# Patient Record
Sex: FEMALE | Race: BLACK OR AFRICAN AMERICAN | NOT HISPANIC OR LATINO | Employment: UNEMPLOYED | URBAN - METROPOLITAN AREA
[De-identification: names, ages, dates, MRNs, and addresses within clinical notes are randomized per-mention and may not be internally consistent; named-entity substitution may affect disease eponyms.]

---
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
## 2021-03-12 ENCOUNTER — APPOINTMENT (OUTPATIENT)
Dept: OBGYN | Age: 21
End: 2021-03-12

## 2021-04-15 ENCOUNTER — APPOINTMENT (OUTPATIENT)
Dept: OBGYN | Age: 21
End: 2021-04-15

## 2021-05-28 ENCOUNTER — RECORDS - HEALTHEAST (OUTPATIENT)
Dept: ADMINISTRATIVE | Facility: CLINIC | Age: 21
End: 2021-05-28

## 2021-05-31 VITALS — HEIGHT: 62 IN | WEIGHT: 105.6 LBS | BODY MASS INDEX: 19.43 KG/M2

## 2021-06-10 NOTE — PROGRESS NOTES
Ibrahima from Dr. Sadnrita Acosta office called in requesting pt's surgical clearance, H&P and labs. Fax # [phone] - pt's surgery is 10/23. [facility] Well Child Check    ASSESSMENT & PLAN  Kevin Urena is a 16  y.o. 6  m.o. who has normal growth and abnormal development:  history of significant mental health problems, currently off meds, not doing well.    Diagnoses and all orders for this visit:    Encounter for routine child health examination without abnormal findings  -     Hearing Screening  -     Vision Screening  -     Sodium Fluoride Application    Dysmenorrhea  -     Pregnancy (Beta-hCG, Qual), Urine    Other orders  -     Cancel: Meningococcal MCV4P  -     Cancel: Hemoglobin  -     sodium fluoride 5 % white varnish 1 packet (VANISH); Apply 1 packet to teeth once.        Return to clinic in 1 year for a Well Child Check or sooner as needed    IMMUNIZATIONS/LABS  Mom refused vaccinations.    REFERRALS  Dental:  Recommend routine dental care as appropriate., Recommended that the patient establish care with a dentist.  Other:  Referrals were made for psychiatric eval, counseling    ANTICIPATORY GUIDANCE  I have reviewed age appropriate anticipatory guidance.  Social:  Friends, Employment and Extracurricular Activities  Parenting:  Homework  Nutrition:  Junk Food and Dieting  Play and Communication:  Hobbies  Health:  Activity (>45 min/day), Sleep and Dental Care  Safety:  Seat Belts  Sexuality:  Safe Sex and Contraception    HEALTH HISTORY  Do you have any concerns that you'd like to discuss today?: depo shot     Mom wants her to start birth control, specifically the Depo shot for painful menstrual cramps. She has never been sexually active. She started her period 4 years ago, it is not regular and has never been. She gets her period around every 7 weeks. She takes 2 Advil or tylenol for cramps. She does not want Nexplanon or IUD. Her period is heavy and lasts for 5 days.    Asthma: Asthma symptoms have been improving in the last few years. She does not have an inhaler and does not think she needs it. She coughs and feels short of breath  "only when running. When she was younger cold symptoms and physical activity triggered asthma symptoms. She used a nebulizer in the past. No allergy symptoms. ACT today is 25. No ER/hospitalizations in the past 12 months.     Mental health: In the past she has been diagnosed with ADHD, ODD, and bipolar disorder. She thinks she is fine but mom thinks Kevin is never happy. Moms friends have reached out to her asking why she is so sad. She was on multiple medications for her psychiatric issues in the past but she is no longer taking any of them because mom thought she was doing well. She has thoughts about hurting herself or wishing she was dead. She does not think she has anyone to talk to if she was feeling like hurting herself. Mom thinks that Kevin would benefit from being on a medication for ADHD again. When she was in public school last year in 10th grade she had problems getting along with other kids and got in a lot of fights.     Mom provided history in private and noted that dad has a \"negative control\" over Kevin, she has very little contact with her dad. Her dad lives in [place] and she wants to go move with him because he tells her he will give her what she wants like an iphone. She has tried \"hurting herself\" this year with ibuprofen pills. She told mom that she was going to take a lot of ibuprofen but she did not do it. Kevin denies it was a suicide attempt. Mom thinks Kevin is \"playing with the idea of suicide\". She is getting into trouble at school. Mom is becoming scared about Kevin's behavior. She was in [facility] in 2014 after a suicide attempt. She has seen a psychiatrist at [facility] in 2013. When she was in counseling she \"outwitted the counselor\" and it did not go well. Mom thinks Kevin ws not really telling the counselor what was going on. Mom has a significant concern she will get kicked out of job [employer] due to her behavior. She falls asleep in school or sneaks out of " "class and walks around the halls.     PHQ 9 today is 16, GUCCI 7 is 9.    Roomed by: caren    Accompanied by Mother    Refills needed? No    Do you have any forms that need to be filled out? No        Do you have any significant health concerns in your family history?: DM, HTN  Family History   Problem Relation Age of Onset     Diabetes Maternal Grandmother      Hyperlipidemia Maternal Grandmother      Heart disease Maternal Grandmother      Asthma Sister      Allergies Sister      Since your last visit, have there been any major changes in your family, such as a move, job change, separation, divorce, or death in the family?: No    Home  Who lives in your home?:  Mom, 2 brother, sister, dog, grandmother,   Social History     Social History Narrative     No narrative on file     Do you have any trouble with sleep?:  yes    Education  What school does your child attend?:  [employer]  What grade is your child in?:  no grade  How does the patient perform in school (grades, behavior, attention, homework?: good   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      Eating  Does patient eat regular meals including fruits and vegetables?:  no  What is the patient drinking (cow's milk, water, soda, juice, sports drinks, energy drinks, etc)?: water, soda and juice, any kind of milk  Does patient have concerns about body or appearance?:  No  She does not eat a lot and does not have an appetite. Mom has been worried about Kevin's eating habits and says she \"eats like a bird\". She eats fruit and veggies and has 3 glasses of 2% milk per day. Great grandma gives her Ensure, she has 8 cans a day. Mom tries to hide Ensure from her. She has had this habit for a few years.     Activities  Does the patient have friends?:  yes  Does the patient get at least one hour of physical activity per day?: She is not active.   Does the patient have less than 2 hours of screen time per day (aside from homework)?:  no  What does " "your child do for exercise?:  Walk, soccer  Does the patient have interest/participate in community activities/volunteers/school sports?:  Yes  School starts at 8 and ends at 3:40. [employer] is in school, goes until August. Mom does not have close communication with school.  She started [employer] ro[employer] because school was difficult for her. Grades went from A's to F's in school once she started making friends at her old school.     MENTAL HEALTH SCREENING  No Data Recorded  No Data Recorded    VISION/HEARING  Vision: Not done: Performed elsewhere: [facility]  Hearing:  Completed. See Results    No exam data present    TB Risk Assessment:  The patient and/or parent/guardian answer positive to:  patient and/or parent/guardian answer 'no' to all screening TB questions    Flouride Varnish Application Screening  Is child seen by dentist?     No  Fluoride Varnish Application risks and benefits discussed and verbal consent was received.    Patient Active Problem List   Diagnosis     Severe Persistent Asthma     Disruptive Behavior Disorder     Attention-deficit Hyperactivity Disorder     Allergic Rhinitis     Bipolar Disorder NOS     Autistic Disorder       Drugs  Does the patient use tobacco/alcohol/drugs?:  No. She has tried smoking marijuana before and notes she would do it more if she could. It helped her calm down.     Safety  Does the patient have any safety concerns (peer or home)?:  no  Does the patient use safety belts, helmets and other safety equipment?:  n/a    Sex  Is the patient sexually active?:  no    MEASUREMENTS  Height:  5' 2\" (1.575 m)  Weight: 105 lb 9.6 oz (47.9 kg)  BMI: Body mass index is 19.31 kg/(m^2).  Blood Pressure: 102/64  Blood pressure percentiles are 22 % systolic and 45 % diastolic based on NHBPEP's 4th Report. Blood pressure percentile targets: 90: 123/79, 95: 127/83, 99 + 5 mmH/96.    PHYSICAL EXAM  Constitutional: She appears well-developed and well-nourished.   HEENT: Head: " Normocephalic.    Right Ear: Tympanic membrane, external ear and canal normal.    Left Ear: Tympanic membrane, external ear and canal normal.    Nose: Nose normal.    Mouth/Throat: Mucous membranes are moist. Oropharynx is clear.    Eyes: Conjunctivae and lids are normal. Pupils are equal, round, and reactive to light. Optic discs are sharp.   Neck: Neck supple. No tenderness is present.   Cardiovascular: Normal rate and regular rhythm. No murmur heard.  Pulses: Femoral pulses are 2+ bilaterally.   Pulmonary/Chest: Effort normal and breath sounds normal. There is normal air entry. Breast development is normal.    Abdominal: Soft. There is no hepatosplenomegaly. No inguinal hernia.   Musculoskeletal: Normal range of motion. Normal strength and tone. No abnormalities. Spine is straight. Normal duck walk.  Normal heel to toe walk.   : Normal external female genitalia.  Jose Daniel stage 5.   Neurological: She is alert. She has normal reflexes. Gait normal.   Psychiatric: She has a normal mood and affect. Her speech is normal and behavior is normal.  Skin: Clear. No rashes.     ADDITIONAL HISTORY SUMMARIZED (2): None.  DECISION TO OBTAIN EXTRA INFORMATION (1): None.   RADIOLOGY TESTS (1): None.  LABS (1): Labs ordered.   MEDICINE TESTS (1): None.  INDEPENDENT REVIEW (2 each): None.     The visit lasted a total of 60 minutes face to face with the patient. Over 50% of the time was spent counseling and educating the patient about birth control.     In addition to the usual time spent on well , an additional 25 minutes were spent counseling and coordinating care regarding the above issues.        IJosefa, am scribing for and in the presence of, Dr. Brown.    I, Dr. Brown, personally performed the services described in this documentation, as scribed by Joseaf Velez in my presence, and it is both accurate and complete.

## 2021-11-22 ENCOUNTER — HOSPITAL ENCOUNTER (OUTPATIENT)
Facility: CLINIC | Age: 21
Setting detail: OBSERVATION
Discharge: HOME | End: 2021-11-23
Attending: EMERGENCY MEDICINE | Admitting: EMERGENCY MEDICINE

## 2021-11-22 DIAGNOSIS — Z11.52 ENCOUNTER FOR SCREENING LABORATORY TESTING FOR SEVERE ACUTE RESPIRATORY SYNDROME CORONAVIRUS 2 (SARS-COV-2): ICD-10-CM

## 2021-11-22 DIAGNOSIS — F10.920 ALCOHOLIC INTOXICATION WITHOUT COMPLICATION (H): ICD-10-CM

## 2021-11-22 DIAGNOSIS — F10.120 ALCOHOL ABUSE WITH UNCOMPLICATED INTOXICATION (H): ICD-10-CM

## 2021-11-22 PROBLEM — F10.929 ALCOHOL INTOXICATION (H): Status: ACTIVE | Noted: 2021-11-22

## 2021-11-22 LAB
ALBUMIN SERPL-MCNC: 3.6 G/DL (ref 3.4–5)
ALP SERPL-CCNC: 57 U/L (ref 40–150)
ALT SERPL W P-5'-P-CCNC: 15 U/L (ref 0–50)
ANION GAP SERPL CALCULATED.3IONS-SCNC: 10 MMOL/L (ref 3–14)
AST SERPL W P-5'-P-CCNC: 22 U/L (ref 0–45)
BASOPHILS # BLD AUTO: 0 10E3/UL (ref 0–0.2)
BASOPHILS NFR BLD AUTO: 1 %
BILIRUB SERPL-MCNC: 0.7 MG/DL (ref 0.2–1.3)
BUN SERPL-MCNC: 5 MG/DL (ref 7–30)
CALCIUM SERPL-MCNC: 8.9 MG/DL (ref 8.5–10.1)
CHLORIDE BLD-SCNC: 111 MMOL/L (ref 94–109)
CO2 SERPL-SCNC: 23 MMOL/L (ref 20–32)
CREAT SERPL-MCNC: 0.64 MG/DL (ref 0.52–1.04)
EOSINOPHIL # BLD AUTO: 0 10E3/UL (ref 0–0.7)
EOSINOPHIL NFR BLD AUTO: 0 %
ERYTHROCYTE [DISTWIDTH] IN BLOOD BY AUTOMATED COUNT: 12.6 % (ref 10–15)
ETHANOL SERPL-MCNC: 0.28 G/DL
GFR SERPL CREATININE-BSD FRML MDRD: >90 ML/MIN/1.73M2
GLUCOSE BLD-MCNC: 86 MG/DL (ref 70–99)
HCT VFR BLD AUTO: 38.6 % (ref 35–47)
HGB BLD-MCNC: 12.6 G/DL (ref 11.7–15.7)
HOLD SPECIMEN: NORMAL
IMM GRANULOCYTES # BLD: 0 10E3/UL
IMM GRANULOCYTES NFR BLD: 0 %
LYMPHOCYTES # BLD AUTO: 2.7 10E3/UL (ref 0.8–5.3)
LYMPHOCYTES NFR BLD AUTO: 35 %
MCH RBC QN AUTO: 29.7 PG (ref 26.5–33)
MCHC RBC AUTO-ENTMCNC: 32.6 G/DL (ref 31.5–36.5)
MCV RBC AUTO: 91 FL (ref 78–100)
MONOCYTES # BLD AUTO: 0.4 10E3/UL (ref 0–1.3)
MONOCYTES NFR BLD AUTO: 6 %
NEUTROPHILS # BLD AUTO: 4.5 10E3/UL (ref 1.6–8.3)
NEUTROPHILS NFR BLD AUTO: 58 %
NRBC # BLD AUTO: 0 10E3/UL
NRBC BLD AUTO-RTO: 0 /100
PLATELET # BLD AUTO: 296 10E3/UL (ref 150–450)
POTASSIUM BLD-SCNC: 3.1 MMOL/L (ref 3.4–5.3)
PROT SERPL-MCNC: 7.4 G/DL (ref 6.8–8.8)
RBC # BLD AUTO: 4.24 10E6/UL (ref 3.8–5.2)
SODIUM SERPL-SCNC: 144 MMOL/L (ref 133–144)
WBC # BLD AUTO: 7.7 10E3/UL (ref 4–11)

## 2021-11-22 PROCEDURE — 99218 PR INITIAL OBSERVATION CARE,LEVEL I: CPT | Performed by: EMERGENCY MEDICINE

## 2021-11-22 PROCEDURE — 99285 EMERGENCY DEPT VISIT HI MDM: CPT | Performed by: EMERGENCY MEDICINE

## 2021-11-22 PROCEDURE — C9803 HOPD COVID-19 SPEC COLLECT: HCPCS | Performed by: EMERGENCY MEDICINE

## 2021-11-22 PROCEDURE — 85025 COMPLETE CBC W/AUTO DIFF WBC: CPT | Performed by: EMERGENCY MEDICINE

## 2021-11-22 PROCEDURE — G0378 HOSPITAL OBSERVATION PER HR: HCPCS

## 2021-11-22 PROCEDURE — 36415 COLL VENOUS BLD VENIPUNCTURE: CPT | Performed by: EMERGENCY MEDICINE

## 2021-11-22 PROCEDURE — 82077 ASSAY SPEC XCP UR&BREATH IA: CPT | Performed by: EMERGENCY MEDICINE

## 2021-11-22 PROCEDURE — 80053 COMPREHEN METABOLIC PANEL: CPT | Performed by: EMERGENCY MEDICINE

## 2021-11-23 VITALS
DIASTOLIC BLOOD PRESSURE: 73 MMHG | HEART RATE: 88 BPM | TEMPERATURE: 98.7 F | SYSTOLIC BLOOD PRESSURE: 127 MMHG | RESPIRATION RATE: 20 BRPM | OXYGEN SATURATION: 98 %

## 2021-11-23 LAB — SARS-COV-2 RNA RESP QL NAA+PROBE: NEGATIVE

## 2021-11-23 PROCEDURE — 99217 PR OBSERVATION CARE DISCHARGE: CPT | Performed by: EMERGENCY MEDICINE

## 2021-11-23 PROCEDURE — G0378 HOSPITAL OBSERVATION PER HR: HCPCS

## 2021-11-23 PROCEDURE — U0003 INFECTIOUS AGENT DETECTION BY NUCLEIC ACID (DNA OR RNA); SEVERE ACUTE RESPIRATORY SYNDROME CORONAVIRUS 2 (SARS-COV-2) (CORONAVIRUS DISEASE [COVID-19]), AMPLIFIED PROBE TECHNIQUE, MAKING USE OF HIGH THROUGHPUT TECHNOLOGIES AS DESCRIBED BY CMS-2020-01-R: HCPCS | Performed by: EMERGENCY MEDICINE

## 2021-11-23 NOTE — ED TRIAGE NOTES
Pt. BIBA from group home for ETOH intoxication. Pt. Responds to pain but otherwise is lethargic. Pt. Placed on cardiac and o2 monitoring, close to nursing observation, WCTM.

## 2021-11-23 NOTE — ED PROVIDER NOTES
ED Observation History and Physical  [facility]  Observation Initiation Date: Nov 22, 2021    Kevin Urena MRN: [id number]   Age: 21 year old YOB: 2000     History     Chief Complaint   Patient presents with     Alcohol Intoxication     HPI  Kevin Urena is a 21 year old female who presented to the ED with altered mental status. History limited due to altered mental status. There is reason to suspect alcohol and/or drug intoxication as the etiology of altered mental status. EMS reports they were called to patient's group home due to her being intoxicated and unresponsive.  Patient had another resident had been drinking together.  Patient and a friend drank 0.5L of vodka.        Past Medical and Surgical History, Medications, Allergies, and Social History were reviewed in the electronic medical record. Review with the patient was attempted but limited due to altered mental status.      Review of Systems  A complete review of systems was attempted but limited due to altered mental status.    Physical Exam        Physical Exam  General: smells of EtOH, no acute distress  HENT: MMM. Atraumatic head.   Eyes: PERRL, normal sclerae, nystagmus present  Neck: non-tender, supple  Cardio: Regular rate. Regular rhythm.   Resp: Normal work of breathing, normal respiratory rate  Abdomen: no tenderness, non-distended, no rebound, no guarding  Neuro: alert, slow to respond. Slurred speech. Confused. CN II-XII grossly intact. Grossly normal strength and sensation.   Integumentary/Skin: no rash visualized, normal color    ED Course      Procedures                       Labs Ordered and Resulted from Time of ED Arrival to Time of ED Departure - No data to display         Assessments & Plan (with Medical Decision Making)   Patient arriving with altered mental status with reason to suspect alcohol or other drug intoxication as etiology. Nursing notes reviewed. Exam without findings  suggestive of trauma, non-focal. EtOH level was 0.28. Potassium is 3.1. Glucose normal per EMS report.     AMS likely due to intoxication delirium but cannot immediately rule out other dangerous etiologies of AMS. Plan close clinical monitoring of the patient and her mental status for clearing of intoxicating substance. With approriate clearing, the patient would likely be able to be discharged. If not appropriately clearing, plan broadening of work-up, potentially including CT head and serum labs.     During my care, the patient did not require medications for agitation, and did not require restraints/seclusion for patient and/or provider safety.     With period of monitoring, the patient continues to clear appropriately but is not yet clinically sober at this time. Patient signed out to oncoming provider with plan for further monitoring, likely discharge if appropriately clear with sobriety, further work-up if indicated.     Preliminary diagnosis:  Altered mental status, unspecified     --  Juan Carlos Hall,    [facility] EMERGENCY DEPARTMENT  11/22/2021      Juan Carlos Hall,   11/23/21 0121

## 2021-11-23 NOTE — ED PROVIDER NOTES
ED Observation Discharge Summary  [facility]  Discharge Date: 11/23/2021    Kevin Urena MRN: [id number]   Age: 21 year old YOB: 2000     Brief HPI & Initial ED Course     Chief Complaint   Patient presents with     Alcohol Intoxication     HPI  Kevin Urena is a 21 year old female who presented to the ED with altered mental status. Initial history was limited due to altered mental status. The patient arrived with altered mental status with reason to suspect alcohol or other drug intoxication as etiology, and an exam that was without findings suggestive of trauma.     Upon being evaluated in the emergency department, the patient was found to have a condition that would benefit from ongoing monitoring. Observation care was initiated with plan for close clinical monitoring of the patient and her mental status for clearing of intoxicating substance, and broadening of the work-up if not clearing appropriately or if other indications develop.     See ED Observation H&P for further details on the patient's presenting history and initial evaluation.     Physical Exam   BP: 100/59  Pulse: 71  Resp: 17  SpO2: 99 %    Physical Exam  General: no acute distress. Alert.   HENT: MMM. Atraumatic head.   Eyes: PERRL, normal sclerae   Neck: non-tender, supple  Cardio: Regular rate. Regular rhythm.   Resp: Normal work of breathing, normal respiratory rate  Abdomen: no tenderness, non-distended, no rebound, no guarding  Neuro: alert, fully oriented. Steady gait. CN II-XII grossly intact. Grossly normal strength and sensation.   Integumentary/Skin: no rash visualized, normal color    Results      Procedures              Labs Ordered and Resulted from Time of ED Arrival to Time of ED Departure   ETHYL ALCOHOL LEVEL - Abnormal       Result Value    Alcohol ethyl 0.28 (*)    COMPREHENSIVE METABOLIC PANEL - Abnormal    Sodium 144      Potassium 3.1 (*)     Chloride 111 (*)     Carbon  Dioxide (CO2) 23      Anion Gap 10      Urea Nitrogen 5 (*)     Creatinine 0.64      Calcium 8.9      Glucose 86      Alkaline Phosphatase 57      AST 22      ALT 15      Protein Total 7.4      Albumin 3.6      Bilirubin Total 0.7      GFR Estimate >90     CBC WITH PLATELETS AND DIFFERENTIAL    WBC Count 7.7      RBC Count 4.24      Hemoglobin 12.6      Hematocrit 38.6      MCV 91      MCH 29.7      MCHC 32.6      RDW 12.6      Platelet Count 296      % Neutrophils 58      % Lymphocytes 35      % Monocytes 6      % Eosinophils 0      % Basophils 1      % Immature Granulocytes 0      NRBCs per 100 WBC 0      Absolute Neutrophils 4.5      Absolute Lymphocytes 2.7      Absolute Monocytes 0.4      Absolute Eosinophils 0.0      Absolute Basophils 0.0      Absolute Immature Granulocytes 0.0      Absolute NRBCs 0.0     COVID-19 VIRUS (CORONAVIRUS) BY PCR            Observation Course   The patient was admitted to observation status with plan for close clinical monitoring of the patient and her mental status for clearing of intoxicating substance, and broadening of the work-up if not clearing appropriately or if other indications develop.     Serial assessments of the patient's mental status were performed. Nursing notes were reviewed. During the observation period, the patient did not require medications for agitation, and did not require restraints/seclusion for patient and/or provider safety.         With monitoring, patient's mental status cleared. Patient then clinically sober with steady gait and tolerating PO. Normalization of mental status with sobering makes other causes of altered mental status very unlikely.     After counseling on the diagnosis, work-up, and treatment plan, the patient was discharged. Recommended safe cessation of EtOH/drugs and provided information on community treatment resources. Patient to follow-up with primary care in the coming days for recheck and further cessation counseling. Patient to  return to the ED if any urgent or potentially life-threatening concerns.    Discharge Diagnoses:   Final diagnoses:   Alcoholic intoxication without complication (H)       --  Terry Nathan MD  [facility] EMERGENCY DEPARTMENT  11/23/2021             Terry Nathan MD  11/23/21 0721

## 2022-08-06 ENCOUNTER — OFFICE VISIT (OUTPATIENT)
Dept: URGENT CARE | Facility: URGENT CARE | Age: 22
End: 2022-08-06
Payer: COMMERCIAL

## 2022-08-06 VITALS
HEART RATE: 81 BPM | DIASTOLIC BLOOD PRESSURE: 74 MMHG | SYSTOLIC BLOOD PRESSURE: 131 MMHG | BODY MASS INDEX: 19.2 KG/M2 | TEMPERATURE: 98.5 F | OXYGEN SATURATION: 100 % | WEIGHT: 105 LBS

## 2022-08-06 DIAGNOSIS — M54.6 ACUTE MIDLINE THORACIC BACK PAIN: Primary | ICD-10-CM

## 2022-08-06 PROCEDURE — 99203 OFFICE O/P NEW LOW 30 MIN: CPT | Performed by: FAMILY MEDICINE

## 2022-08-06 RX ORDER — HYDROXYZINE HYDROCHLORIDE 50 MG/1
TABLET, FILM COATED ORAL
COMMUNITY
Start: 2022-06-29 | End: 2023-02-08

## 2022-08-06 RX ORDER — CYCLOBENZAPRINE HCL 10 MG
10 TABLET ORAL 3 TIMES DAILY PRN
Qty: 30 TABLET | Refills: 1 | Status: SHIPPED | OUTPATIENT
Start: 2022-08-06 | End: 2023-02-08

## 2022-08-06 NOTE — PROGRESS NOTES
Subjective: Patient just started a new job where she is standing all day but is not particularly physical and today was her second day at work and she felt fine in the morning but starting about 1:00 after about 7 hours of work she developed some pain in the thoracic region of her back.  Last December she had a fall on the ice and had pain in that area but it seemed to get better in a short time so she did not think much of it.  She has done this kind of work in the past without any difficulty.  She took Advil about 2 hours ago and it has not helped much.  It hurts to twist.    Objective: Vitals are stable.  Moves normally.  The lungs are clear.  No pain on palpation in the mid thoracic region.  No CVA tenderness.  Abdomen is benign.  It hurts to lie down.    Assessment and plan: I think she just had an kind of overuse injury probably partly due to the previous injury in December and it just needs some time to recover.  Unfortunately since she just started the job she does not feel she can take any time off and hopefully she can power through it.  I did offer some Flexeril for muscle spasm.  I warned her about side effects of sleepiness.  X-rays would not be particularly helpful as this is not likely in the bones.

## 2022-08-29 ENCOUNTER — HOSPITAL ENCOUNTER (EMERGENCY)
Facility: CLINIC | Age: 22
Discharge: NURSING, REHAB OR LONG-TERM CARE | End: 2022-08-30
Attending: PSYCHIATRY & NEUROLOGY | Admitting: PSYCHIATRY & NEUROLOGY
Payer: COMMERCIAL

## 2022-08-29 DIAGNOSIS — F10.920 ALCOHOLIC INTOXICATION WITHOUT COMPLICATION (H): ICD-10-CM

## 2022-08-29 DIAGNOSIS — F16.10 ECSTASY ABUSE (H): ICD-10-CM

## 2022-08-29 DIAGNOSIS — R45.851 SUICIDAL IDEATION: ICD-10-CM

## 2022-08-29 LAB — ALCOHOL BREATH TEST: 0.2 (ref 0–0.01)

## 2022-08-29 PROCEDURE — 99285 EMERGENCY DEPT VISIT HI MDM: CPT | Mod: 25 | Performed by: PSYCHIATRY & NEUROLOGY

## 2022-08-29 PROCEDURE — 99283 EMERGENCY DEPT VISIT LOW MDM: CPT | Performed by: PSYCHIATRY & NEUROLOGY

## 2022-08-29 PROCEDURE — 96372 THER/PROPH/DIAG INJ SC/IM: CPT | Performed by: INTERNAL MEDICINE

## 2022-08-29 PROCEDURE — 82075 ASSAY OF BREATH ETHANOL: CPT | Performed by: PSYCHIATRY & NEUROLOGY

## 2022-08-29 PROCEDURE — 250N000011 HC RX IP 250 OP 636: Performed by: INTERNAL MEDICINE

## 2022-08-29 RX ORDER — OLANZAPINE 10 MG/2ML
5 INJECTION, POWDER, FOR SOLUTION INTRAMUSCULAR ONCE
Status: COMPLETED | OUTPATIENT
Start: 2022-08-29 | End: 2022-08-29

## 2022-08-29 RX ADMIN — OLANZAPINE 5 MG: 10 INJECTION, POWDER, FOR SOLUTION INTRAMUSCULAR at 23:17

## 2022-08-29 ASSESSMENT — ENCOUNTER SYMPTOMS
DIFFICULTY URINATING: 0
FEVER: 0
ABDOMINAL PAIN: 0
SHORTNESS OF BREATH: 0
EYE REDNESS: 0
ARTHRALGIAS: 0
CONFUSION: 0
HEADACHES: 0
NECK STIFFNESS: 0
COLOR CHANGE: 0

## 2022-08-29 ASSESSMENT — ACTIVITIES OF DAILY LIVING (ADL): ADLS_ACUITY_SCORE: 35

## 2022-08-30 VITALS
TEMPERATURE: 96.6 F | RESPIRATION RATE: 16 BRPM | HEART RATE: 95 BPM | SYSTOLIC BLOOD PRESSURE: 126 MMHG | DIASTOLIC BLOOD PRESSURE: 87 MMHG | OXYGEN SATURATION: 99 %

## 2022-08-30 PROCEDURE — 90791 PSYCH DIAGNOSTIC EVALUATION: CPT

## 2022-08-30 ASSESSMENT — ACTIVITIES OF DAILY LIVING (ADL)
ADLS_ACUITY_SCORE: 35

## 2022-08-30 NOTE — ED NOTES
Emergency Department Patient Sign-out       Brief HPI:  This is a 21 year old female signed out to me by Dr. Lincoln .  See initial ED Provider note for details of the presentation.            Significant Events prior to my assuming care: From . Used ecstacy, etoh and SI from [place]. Agitated here, Code called and zyprexa given. Needs MH eval.      Exam:   Patient Vitals for the past 24 hrs:   BP Temp Temp src Pulse Resp SpO2   08/29/22 2349 115/63 (!) 96.6  F (35.9  C) Axillary 84 16 99 %           ED RESULTS:   Results for orders placed or performed during the hospital encounter of 08/29/22 (from the past 24 hour(s))   Alcohol breath test POCT     Status: Abnormal    Collection Time: 08/29/22 10:53 PM   Result Value Ref Range    Alcohol Breath Test 0.199 (A) 0.00 - 0.01       ED MEDICATIONS:   Medications   OLANZapine (zyPREXA) injection 5 mg (5 mg Intramuscular Given 8/29/22 6085)         Impression:    ICD-10-CM    1. Suicidal ideation  R45.851    2. 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 Alcoholic intoxication without complication (H)  F10.920        Plan:    Patient sobered overnight. Has no SI this morning. Will have  see patient, anticipate discharge home. Pt signed out to morning provider..        MD Jac Hernandez, Terry Villanueva MD  08/30/22 8181
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
## 2022-08-30 NOTE — ED NOTES
Patient tearful and concerned about losing her bed at the shelter. Patient requesting staff to call the [facility] and let them know that she is here so that she does not lose her bed due to being late for curfew. Writer spoke with Francine at the [facility] per patient request. Francine reported that patient needs to show paperwork when she comes back to the house that she was seen at the hospital.

## 2022-08-30 NOTE — ED NOTES
Clinician reviewed chart and attempted to see patient.  She is sleeping and not easily awakened.  Discussed with Dr. Dominguez.  Assessment deferred until patient able to engage in interview.    Nayeli Jane, RIKCSW

## 2022-08-30 NOTE — ED NOTES
Pt signed out to me by Dr. David Nathan at 7 am.       Situation: 22 yo female who was brought to the ER intoxicated and under influence of substances. Pt was unable to contract for safety and had a code 21 called and was given zyprexa overnight.     Plan:  Morning assessment.     Shift Report:    Pt was seen by the DEC . She got kicked out of her mom's house few months prior. She is currently staying in a shelter.   She was previously in job core and is now kicked out because of some legal issues. She is wanting to resolve her legal issues so she can get back on job core. She is on zoloft and atarax. No SI or HI. She does not remember what happened last night. She does not recall jumping in front of bus. She denies using ectasy last night but did use earlier this month.        Signed:  Gretchen Dominguez MD  August 30, 2022 at 9:15 AM       Gretchen Dominguez MD  08/30/22 0915

## 2022-08-30 NOTE — ED NOTES
"Pt. continues to try and leave.  Grabbed pen of cart by security and refused to give it up to staff.  Pt. held pen in hand and told security,  \"I don't care about myself.  I don't care about anyone.\"  Pt. made motion like she was going to use pen to hurt herself. Pt. eventually gave up pen, but then tried to enter area to get clothing.  When asked to stop pt. began kicking security and other staff.  Kick writer in abd.  Was then taken to ground,  code 21 called.  Pt. placed on backboard and taken to room 14 and placed in 5 point restraints.  Medicated with 5 mg IM zyprexa.  "

## 2022-08-30 NOTE — ED TRIAGE NOTES
Patient brought in by EMS for threatening suicidal and jumping in front of buses at the [facility]. EMS reports that she reports taking ecstasy and is high. EMS reports that she is rambling and crying.    Patient denies taking ecstasy, reports that she wanted it.

## 2022-08-30 NOTE — DISCHARGE INSTRUCTIONS
The Triage and Transitions care coordination team will call you to offer assistance in scheduling therapy.  They can be reached at [phone].  Please follow up with your established provider for continued medication management.        Aftercare Plan  If I am feeling unsafe or I am in a crisis, I will:   Contact my established care providers   Call the National Suicide Prevention Lifeline: 988  Go to the nearest emergency room   Call 911     Warning signs that I or other people might notice when a crisis is developing for me: increased drinking, anger/agitation, anxiety    Things I am able to do on my own to cope or help me feel better: take my medication, decrease/stop drinking alcohol or using drugs    Things that I am able to do with others to cope or help me better: spend time with friends/roommate     Things I can use or do for distraction: taking a shower/bath, music, work     Changes I can make to support my mental health and wellness: start therapy    People in my life that I can ask for help: my roommate.  Family-mom and grandmother     Your [facility] has a mental health crisis team you can call 24/7: [facility] Mobile Crisis  [phone] (adults)  [phone] (children)    Other things that are important when I'm in crisis:     Additional resources and information: Reduce Extreme Emotion  QUICKLY:  Changing Your Body Chemistry      T:  Change your body Temperature to change your autonomic nervous system   Use Ice Water to calm yourself down FAST   Put your face in a bowl of ice water (this is the best way; have the person keep his/her face in ice water for 30-45 seconds - initial research is showing that the longer s/he can hold her/his face in the water, the better the response), or   Splash ice water on your face, or hold an ice pack on your face      I:  Intensely exercise to calm down a body revved up by emotion   Examples: running, walking fast, jumping, playing basketball, weight lifting,  swimming, calisthenics, etc.   Engage in exercises that DO NOT include violent behaviors. Exercises that utilize violent behaviors tend to function as  behavioral rehearsal,  and rather than calming the person down, may actually  rev  the person up more, increasing the likelihood of violence, and lessening the likelihood that they will  burn off  energy     P:  Progressively relax your muscles   Starting with your hands, moving to your forearms, upper arms, shoulders, neck, forehead, eyes, cheeks and lips, tongue and teeth, chest, upper back, stomach, buttocks, thighs, calves, ankles, feet   Tense (10 seconds,   of the way), then relax each muscle (all the way)   Notice the tension   Notice the difference when relaxed (by tensing first, and then relaxing, you are able to get a more thorough relaxation than by simply relaxing)     P: Paced breathing to relax   The standard technique is to begin with counting the number of steps one takes for a typical inhale, then counting the steps one takes for a typical exhale, and then lengthening the amount of steps for the exhalation by one or two steps.  OR  Repeat this pattern for 1-2 minutes  Inhale for four (4) seconds   Exhale for six (6) to eight (8) seconds   Research demonstrated that one can change one's overall level of anxiety by doing this exercise for even a few minutes per day     Chemical Health Resources      *[facility] CD Intake  (type CD intake in the search field)  www.[employer].org  [phone]   inpatient services [phone]  or [phone] To arrange an appointment with CD counselor   Joanne, [facility]  www.lexa.org  [phone] Hours vary, call for information  Rule 25 assessments  Counseling & assessments  For CD & outpatient treatment   [place]  [facility] ([facility])  http://mntc.org   [phone] [address], [facility]  Residential drug and alcohol programs serving teens and adults from all ethnic, socioeconomic and  "Orthodox backgrounds       AA                                     [phone]                        [place] [facility] site  http://www.aastpaul.org/     Crisis Lines  Crisis Text Line  Text 377005  You will be connected with a trained live crisis counselor to provide support.    Anibal mejia, gualbertoo  ROSMERY a 675371 o texto a 442-AYUDAME en WhatsApp    [employer] (LGBTQ Youth Crisis Line)  3.727.152.9295  text START to 442-464      Smart[employer]Today.com  Fast Tracker  Linking people to mental health and substance use disorder resources  [employer]     [place] Mental Health Warm Line  Peer to peer support  Monday thru Saturday, 12 pm to 10 pm  [phone] or 6.494.970.2502  Text \"Support\" to 87831    National [employer] on Mental Illness (LORENA)  667.507.9203 or 1.888.LORENA.HELPS      Mental Health Apps  My3  https://[employer].org/    VirtualHopeBox  https://[employer]/apps/virtual-hope-box/      Additional Information  Today you were seen by a licensed mental health professional through Triage and Transition services, [facility] ([facility])  for a crisis assessment in the Emergency Department at [facility].  It is recommended that you follow up with your established providers (psychiatrist, mental health therapist, and/or primary care doctor - as relevant) as soon as possible. Coordinators from [facility] will be calling you in the next 24-48 hours to ensure that you have the resources you need.  You can also contact [facility] coordinators directly at [phone]. You may have been scheduled for or offered an appointment with a mental health provider. [facility] maintains an extensive network of licensed behavioral health providers to connect patients with the services they need.  We do not charge providers a fee to participate in our referral network.  We match patients with providers based on a patient's specific needs, insurance coverage, and location.  Our first effort will be " to refer you to a provider within your care system, and will utilize providers outside your care system as needed.

## 2022-08-30 NOTE — ED PROVIDER NOTES
[facility] [place] [place] ([facility])       8/29/22  History     Chief Complaint   Patient presents with     Suicidal     Pt made suicidal statements     Alcohol Intoxication     HPI  Kevin Urena is a 21 year old female with a past medical history significant for ADHD, bipolar disorder, and autism who presents to the Emergency Department per EMS for mental health evaluation.  Patient threatened suicidal intentions and jumped in front of a bus at the state [facility]. Per EMS, patient had taken ecstasy, however, patient denies doing so. Patient lives at the [facility].     Past Medical History  No past medical history on file.  No past surgical history on file.  albuterol (PROVENTIL HFA;VENTOLIN HFA) 90 mcg/actuation inhaler  albuterol sulfate 2.5 mg/0.5 mL Nebu  cyclobenzaprine (FLEXERIL) 10 MG tablet  hydrOXYzine (ATARAX) 50 MG tablet  sertraline (ZOLOFT) 50 MG tablet      Allergies   Allergen Reactions     Azithromycin Unknown     Zithromax [Azithromycin]      Family History  Family History   Problem Relation Age of Onset     Diabetes Maternal Grandmother      Hyperlipidemia Maternal Grandmother      Heart Disease Maternal Grandmother      Asthma Sister      Allergies Sister      Social History   Social History     Tobacco Use     Smoking status: Never Smoker     Smokeless tobacco: Never Used      Past medical history, past surgical history, medications, allergies, family history, and social history were reviewed with the patient. No additional pertinent items.     I have reviewed the Medications, Allergies, Past Medical and Surgical History, and Social History in the Epic system.    Review of Systems   Constitutional: Negative for fever.   HENT: Negative for congestion.    Eyes: Negative for redness.   Respiratory: Negative for shortness of breath.    Cardiovascular: Negative for chest pain.   Gastrointestinal: Negative for abdominal pain.   Genitourinary: Negative for difficulty  urinating.   Musculoskeletal: Negative for arthralgias and neck stiffness.   Skin: Negative for color change.   Neurological: Negative for headaches.   Psychiatric/Behavioral: Negative for confusion.     A complete review of systems was performed with pertinent positives and negatives noted in the HPI, and all other systems negative.    Physical Exam          Physical Exam  Constitutional:       General: She is not in acute distress.     Appearance: She is not diaphoretic.   HENT:      Head: Atraumatic.      Mouth/Throat:      Pharynx: No oropharyngeal exudate.   Eyes:      General: No scleral icterus.     Pupils: Pupils are equal, round, and reactive to light.   Cardiovascular:      Rate and Rhythm: Normal rate and regular rhythm.      Heart sounds: Normal heart sounds.   Pulmonary:      Effort: No respiratory distress.      Breath sounds: Normal breath sounds.   Abdominal:      General: Bowel sounds are normal.      Palpations: Abdomen is soft.      Tenderness: There is no abdominal tenderness.   Musculoskeletal:         General: No tenderness.      Cervical back: Neck supple.   Skin:     General: Skin is warm.      Findings: No rash.   Neurological:      General: No focal deficit present.      Cranial Nerves: No cranial nerve deficit.   Psychiatric:         Attention and Perception: Attention normal.         Mood and Affect: Mood is anxious. Affect is labile.         Speech: Speech is rapid and pressured.         Behavior: Behavior is agitated, aggressive and combative.         Thought Content: Thought content includes suicidal ideation.         ED Course          Procedures                Results for orders placed or performed during the hospital encounter of 08/29/22 (from the past 24 hour(s))   Alcohol breath test POCT   Result Value Ref Range    Alcohol Breath Test 0.199 (A) 0.00 - 0.01     Medications   OLANZapine (zyPREXA) injection 5 mg (5 mg Intramuscular Given 8/29/22 2636)             Assessments & Plan  (with Medical Decision Making)   21 yof resident of  brought in for SI evaluation, with ETOH and Ecxtasy, then agitated and called code 21, given zyprexa 5 im, needs to be sobered up and re-evaluated. Will sign off to incoming EP.    I have reviewed the nursing notes.    I have reviewed the findings, diagnosis, plan and need for follow up with the patient.    New Prescriptions    No medications on file       Final diagnoses:   Suicidal ideation   Ecstasy abuse (H)   Alcoholic intoxication without complication (H)       I, Jacquelin Shah am serving as a trained medical scribe to document services personally performed by Jaden Lincoln MD, based on the provider's statements to me.      IJaden MD, was physically present and have reviewed and verified the accuracy of this note documented by Jacquelin Shah.     Jaden Lincoln MD  8/29/2022   [facility] EMERGENCY DEPARTMENT     Jaden Lincoln MD  08/31/22 2015

## 2022-08-30 NOTE — ED NOTES
"Pt got up to use the bathroom. Pt now calm and cooperative. Went back to bed after using the bathroom. Offered H2O and pt stated \"I think I will just go back to sleep\" and laid down and closed her eyes. Will continue to monitor.   "

## 2022-08-30 NOTE — ED NOTES
Pt woke up and used bathroom. This writer explained plan of care (assessment needed due to concern for harm last night). Pt went back to room and cooperated with plan of care and vitals. Pt offered food and agreed. When staff came back shortly with food pt become agitated demanding to leave and unable to be redirected to room. Pt attempting to push past this writer and security multiple times. BCS used to take down pt as pt attempting to elope and potential danger to self. Pt then able to deescalated from take down and was able to walk to room with direction of Dr. Dominguez. Pt still agitated and demanding phone. Pt given phone to call mom and will be reassessed.

## 2022-09-02 ENCOUNTER — OFFICE VISIT (OUTPATIENT)
Dept: URGENT CARE | Facility: URGENT CARE | Age: 22
End: 2022-09-02
Payer: COMMERCIAL

## 2022-09-02 VITALS
HEART RATE: 84 BPM | TEMPERATURE: 98.1 F | DIASTOLIC BLOOD PRESSURE: 88 MMHG | SYSTOLIC BLOOD PRESSURE: 130 MMHG | OXYGEN SATURATION: 100 %

## 2022-09-02 DIAGNOSIS — Z71.1 CONCERN ABOUT STD IN FEMALE WITHOUT DIAGNOSIS: ICD-10-CM

## 2022-09-02 DIAGNOSIS — B96.89 BACTERIAL VAGINOSIS: ICD-10-CM

## 2022-09-02 DIAGNOSIS — N76.0 BACTERIAL VAGINOSIS: ICD-10-CM

## 2022-09-02 DIAGNOSIS — N30.01 ACUTE CYSTITIS WITH HEMATURIA: Primary | ICD-10-CM

## 2022-09-02 LAB
ALBUMIN UR-MCNC: ABNORMAL MG/DL
APPEARANCE UR: CLEAR
BACTERIA #/AREA URNS HPF: ABNORMAL /HPF
BILIRUB UR QL STRIP: NEGATIVE
CLUE CELLS: PRESENT
COLOR UR AUTO: YELLOW
GLUCOSE UR STRIP-MCNC: NEGATIVE MG/DL
HGB UR QL STRIP: ABNORMAL
KETONES UR STRIP-MCNC: ABNORMAL MG/DL
LEUKOCYTE ESTERASE UR QL STRIP: ABNORMAL
NITRATE UR QL: NEGATIVE
PH UR STRIP: 6 [PH] (ref 5–7)
RBC #/AREA URNS AUTO: ABNORMAL /HPF
SP GR UR STRIP: 1.02 (ref 1–1.03)
SQUAMOUS #/AREA URNS AUTO: ABNORMAL /LPF
TRICHOMONAS, WET PREP: ABNORMAL
UROBILINOGEN UR STRIP-ACNC: 2 E.U./DL
WBC #/AREA URNS AUTO: ABNORMAL /HPF
WBC'S/HIGH POWER FIELD, WET PREP: ABNORMAL
YEAST, WET PREP: ABNORMAL

## 2022-09-02 PROCEDURE — 81001 URINALYSIS AUTO W/SCOPE: CPT | Performed by: EMERGENCY MEDICINE

## 2022-09-02 PROCEDURE — 99213 OFFICE O/P EST LOW 20 MIN: CPT | Mod: 25 | Performed by: EMERGENCY MEDICINE

## 2022-09-02 PROCEDURE — 87591 N.GONORRHOEAE DNA AMP PROB: CPT | Performed by: EMERGENCY MEDICINE

## 2022-09-02 PROCEDURE — 96372 THER/PROPH/DIAG INJ SC/IM: CPT | Performed by: EMERGENCY MEDICINE

## 2022-09-02 PROCEDURE — 87210 SMEAR WET MOUNT SALINE/INK: CPT | Performed by: EMERGENCY MEDICINE

## 2022-09-02 PROCEDURE — 87086 URINE CULTURE/COLONY COUNT: CPT | Performed by: EMERGENCY MEDICINE

## 2022-09-02 PROCEDURE — 87491 CHLMYD TRACH DNA AMP PROBE: CPT | Performed by: EMERGENCY MEDICINE

## 2022-09-02 RX ORDER — DIPHENHYDRAMINE HCL 25 MG
25 CAPSULE ORAL EVERY 6 HOURS PRN
Qty: 14 CAPSULE | Refills: 0 | Status: SHIPPED | OUTPATIENT
Start: 2022-09-02 | End: 2023-02-08

## 2022-09-02 RX ORDER — CEPHALEXIN 500 MG/1
500 CAPSULE ORAL 3 TIMES DAILY
Qty: 15 CAPSULE | Refills: 0 | Status: SHIPPED | OUTPATIENT
Start: 2022-09-02 | End: 2023-02-08

## 2022-09-02 RX ORDER — METRONIDAZOLE 500 MG/1
500 TABLET ORAL 2 TIMES DAILY
Qty: 14 TABLET | Refills: 0 | Status: SHIPPED | OUTPATIENT
Start: 2022-09-02 | End: 2023-02-08

## 2022-09-02 RX ORDER — METRONIDAZOLE 500 MG/1
500 TABLET ORAL 2 TIMES DAILY
Qty: 14 TABLET | Refills: 0 | Status: SHIPPED | OUTPATIENT
Start: 2022-09-02 | End: 2022-09-02

## 2022-09-02 RX ORDER — CEPHALEXIN 500 MG/1
500 CAPSULE ORAL 3 TIMES DAILY
Qty: 15 CAPSULE | Refills: 0 | Status: SHIPPED | OUTPATIENT
Start: 2022-09-02 | End: 2022-09-02

## 2022-09-02 RX ORDER — AZITHROMYCIN 500 MG/1
1000 TABLET, FILM COATED ORAL ONCE
Status: COMPLETED | OUTPATIENT
Start: 2022-09-02 | End: 2022-09-02

## 2022-09-02 RX ADMIN — AZITHROMYCIN 1000 MG: 500 TABLET, FILM COATED ORAL at 13:07

## 2022-09-02 NOTE — PROGRESS NOTES
Assessment & Plan     Diagnosis:    (N30.01) Acute cystitis with hematuria  (primary encounter diagnosis)  Plan: cephALEXin (KEFLEX) 500 MG capsule    (N76.0,  B96.89) Bacterial vaginosis  Plan:  metroNIDAZOLE (FLAGYL) 500 MG         tablet    (Z71.1) Concern about STD in female without diagnosis      Medical Decision Making:  Kevin Urena is a 21 year old female who presented with concern for sexually transmitted infection. The patient states that she is on Depo and is not concerned for pregnancy; testing deferred. The patient came in for evaluation because of vaginal discharge and dysuria.  She does have signs of UTI on UA; no signs or symptoms concerning for pyelonephritis or intra-abdominal or pelvic process such as appendicitis, diverticulitis, ovarian torsion or mass.  Patient declines pelvic exam after shared decision making. She has a benign abdominal exam and no pelvic pain concerning for PID at this time. She is well-appearing. Does not appear toxic/septic. Wet prep shows BV. GC/Chlamydia test obtained based on concerns; the patient was treated empirically with Rocephin and Azithromycin (discussed allergy she states that she is also had an allergic reaction to doxycycline in the past -notes with azithromycin is always just a very mild rash that goes away after Benadryl; no anaphylaxis.  This was given and she was monitored for half hour here; understands reasons to go to the ER.    The patient was informed that we are not providing comprehensive STD testing or treatment and that she needs to follow up with a STD clinic or her primary care provider for complete testing. Additionally, the patient was informed that they need to abstain from sexual activity until cleared at follow up and for at least 10 days.  The patient was also that swabs will come back with results at a later date and they will be contacted only if positive.    The patient was given return precautions and follow up instructions,  "they state understanding of these and ability to comply.    With reasonable clinical certainty, I believe the patient is safe for discharge and can be safely managed as an outpatient with close PCP or gynecology follow up.    30 minutes spent on the date of the encounter doing chart review, review of outside records, review of test results, interpretation of tests, patient visit and documentation       Jermaine English PA-C  [facility] U[facility]    Crystal Urena is a 21 year old female who presents to clinic today for the following health issues:  Chief Complaint   Patient presents with     Urgent Care     Vaginal Problem     Strong smell, yellow discharge x's 1 week        HPI    GYN Complaint    Onset of symptoms was 1 week ago.  Course of illness is worsening.    Severity: mild/moderate  Current and Associated symptoms: vaginal discharge described as copious, thick and malodorous, vulvar itching and odor  Treatment measures tried include:  None  Sexually active: yes, single partner, contraception - depoprovera  Predisposing factors: None  Hx of previous symptom: none and frequent    Patient describes the symptoms as \"thick fishy discharge; and also some pain peeing.\"     Patient is concerned for STDs based and would like tx for gonorrhea.     Patient denies any fever, abdominal pain, hematuria, frequency, back or flank pain, vaginal bleeding or concerns for pregnancy.      Review of Systems    See HPI    Objective      Vitals: /88   Pulse 84   Temp 98.1  F (36.7  C) (Temporal)   LMP 07/16/2022   SpO2 100%     Patient Vitals for the past 24 hrs:   BP Temp Temp src Pulse SpO2   09/02/22 1220 130/88 98.1  F (36.7  C) Temporal 84 100 %       Vital signs reviewed by: Jermaine English PA-C    Physical Exam   Constitutional: Patient is alert and cooperative. No acute distress.  Cardiovascular: Regular rate and rhythm  Pulmonary/Chest: Lungs are clear to auscultation throughout. " Effort normal. No respiratory distress. No wheezes, rales or rhonchi.  GI: Abdomen is soft and non-tender throughout. Normoactive bowel sounds.  : Deferred.  MSK: No CVA tenderness bilaterally.  Neurological: Alert and oriented x3.   Psychiatric:The patient has a normal mood and affect.     Labs/Imaging:  Results for orders placed or performed in visit on 09/02/22   UA macro with reflex to Microscopic and Culture - Clinc Collect     Status: Abnormal    Specimen: Urine, Clean Catch   Result Value Ref Range    Color Urine Yellow Colorless, Straw, Light Yellow, Yellow    Appearance Urine Clear Clear    Glucose Urine Negative Negative mg/dL    Bilirubin Urine Negative Negative    Ketones Urine Trace (A) Negative mg/dL    Specific Gravity Urine 1.025 1.003 - 1.035    Blood Urine Large (A) Negative    pH Urine 6.0 5.0 - 7.0    Protein Albumin Urine Trace (A) Negative mg/dL    Urobilinogen Urine 2.0 (A) 0.2, 1.0 E.U./dL    Nitrite Urine Negative Negative    Leukocyte Esterase Urine Small (A) Negative   Urine Microscopic     Status: Abnormal   Result Value Ref Range    Bacteria Urine Few (A) None Seen /HPF    RBC Urine 10-25 (A) 0-2 /HPF /HPF    WBC Urine 10-25 (A) 0-5 /HPF /HPF    Squamous Epithelials Urine Few (A) None Seen /LPF   Wet prep - Clinic Collect     Status: Abnormal    Specimen: Vagina; Swab   Result Value Ref Range    Trichomonas Absent Absent    Yeast Absent Absent    Clue Cells Present (A) Absent    WBCs/high power field 1+ (A) None         Interventions:    Azithromycin 1000mg PO  Rocephin 500mg IM      Jermaine English PA-C, September 2, 2022

## 2022-09-02 NOTE — LETTER
[facility] [place] [address] [place] [address]  [phone]          September 2, 2022    RE:  Kevin Urena                                                                                                                                                       [address] [place] [address]            To whom it may concern:    Kevin Urena is under my professional care for    UNDISCLOSED.  She may return to work with the following: The employee is ABLE to return to work today. Please excuse her absence earlier today 9/2/22      Sincerely,        Jermaine English PA-C

## 2022-09-03 LAB
C TRACH DNA SPEC QL PROBE+SIG AMP: NEGATIVE
N GONORRHOEA DNA SPEC QL NAA+PROBE: NEGATIVE

## 2022-09-04 LAB — BACTERIA UR CULT: NO GROWTH

## 2022-09-22 ENCOUNTER — OFFICE VISIT (OUTPATIENT)
Dept: URGENT CARE | Facility: URGENT CARE | Age: 22
End: 2022-09-22
Payer: COMMERCIAL

## 2022-09-22 VITALS
OXYGEN SATURATION: 98 % | WEIGHT: 105 LBS | HEART RATE: 61 BPM | DIASTOLIC BLOOD PRESSURE: 67 MMHG | BODY MASS INDEX: 19.2 KG/M2 | TEMPERATURE: 98.2 F | SYSTOLIC BLOOD PRESSURE: 119 MMHG

## 2022-09-22 DIAGNOSIS — B35.4 TINEA CORPORIS: Primary | ICD-10-CM

## 2022-09-22 LAB
KOH PREPARATION: NORMAL
KOH PREPARATION: NORMAL

## 2022-09-22 PROCEDURE — 87220 TISSUE EXAM FOR FUNGI: CPT | Performed by: PHYSICIAN ASSISTANT

## 2022-09-22 PROCEDURE — 99213 OFFICE O/P EST LOW 20 MIN: CPT | Performed by: PHYSICIAN ASSISTANT

## 2022-09-22 RX ORDER — ITRACONAZOLE 100 MG/1
200 CAPSULE ORAL DAILY
Qty: 14 CAPSULE | Refills: 0 | Status: SHIPPED | OUTPATIENT
Start: 2022-09-22 | End: 2022-09-29

## 2022-09-22 RX ORDER — KETOCONAZOLE 20 MG/ML
SHAMPOO TOPICAL DAILY PRN
Qty: 120 ML | Refills: 1 | Status: SHIPPED | OUTPATIENT
Start: 2022-09-22 | End: 2023-02-08

## 2022-09-22 ASSESSMENT — ENCOUNTER SYMPTOMS: FEVER: 0

## 2022-09-22 NOTE — PROGRESS NOTES
Assessment & Plan:        ICD-10-CM    1. Tinea corporis  B35.4 KOH prep (skin, hair or nails only)     ketoconazole (NIZORAL) 2 % external shampoo     itraconazole (SPORANOX) 100 MG capsule         Plan/Clinical Decision Making:    LEYDA was negative.  Has rash highly suggestive of tinea corporis.   Circular lesions with raised edges and central clearing.   Discussed treatment options.   Interested in oral x once a week, would like antifungal shampoo also.   Feels like cream too hard to apply to lesions.   Patient with normal LFTs in past, no alcohol use, avoid Tylenol use.       Return if symptoms worsen or fail to improve in 1-2 weeks.     At the end of the encounter, I discussed results, diagnosis, medications. Discussed red flags for immediate return to clinic/ER, as well as indications for follow up if no improvement. Patient understood and agreed to plan. Patient was stable for discharge.        Christin Last PA-C on 9/22/2022 at 12:32 PM          Subjective:     HPI:    Kevin is a 21 year old female who presents to clinic today for the following health issues:  Chief Complaint   Patient presents with     Urgent Care     Rash     C/O rash for 2 weeks     HPI    Patient complains of rash for two weeks.  Started on arm and now worsening and spreading to torso.   Worried about ringworm, currently living in shelter.   Has circular lesions. Itchy. No hx of eczema.  Has been using ring room cream, helped, but didn't have enough to treat in the one tube.   No fever, no recent illness.   No known exposure to anything.     History obtained from the patient.    Review of Systems   Constitutional: Negative for fever.         Patient Active Problem List   Diagnosis     Alcohol intoxication (H)     Asthma     Disruptive Behavior Disorder     Attention deficit hyperactivity disorder (ADHD)     Allergic Rhinitis     Bipolar Disorder NOS     Autistic Disorder        No past medical history on file.    Social History      Tobacco Use     Smoking status: Never Smoker     Smokeless tobacco: Never Used   Substance Use Topics     Alcohol use: Yes     Comment: intoxicated upon arrival to ED today             Objective:     Vitals:    09/22/22 1218   BP: 119/67   Pulse: 61   Temp: 98.2  F (36.8  C)   TempSrc: Tympanic   SpO2: 98%   Weight: 47.6 kg (105 lb)         Physical Exam   EXAM:   Pleasant, alert, appropriate appearance. NAD.  Head Exam: Normocephalic, atraumatic.  Neuro: CN II-XII intact grossly intact.  Skin: multiple circular dime to quarter size lesions with slightly raised edges with central clearing, mildly flaky.   Rash on right arm and torso      Results:  Results for orders placed or performed in visit on 09/22/22   KOH prep (skin, hair or nails only)     Status: None    Specimen: Arm, Right; Skin   Result Value Ref Range    KOH Preparation No fungal elements seen     KOH Preparation Reference Range: No fungal elements seen.

## 2023-02-08 ENCOUNTER — ANCILLARY ORDERS (OUTPATIENT)
Dept: MIDWIFE SERVICES | Facility: CLINIC | Age: 23
End: 2023-02-08

## 2023-02-08 ENCOUNTER — OFFICE VISIT (OUTPATIENT)
Dept: MIDWIFE SERVICES | Facility: CLINIC | Age: 23
End: 2023-02-08
Payer: COMMERCIAL

## 2023-02-08 ENCOUNTER — HOSPITAL ENCOUNTER (OUTPATIENT)
Dept: ULTRASOUND IMAGING | Facility: HOSPITAL | Age: 23
Discharge: HOME | End: 2023-02-08
Attending: MIDWIFE | Admitting: MIDWIFE
Payer: COMMERCIAL

## 2023-02-08 VITALS
BODY MASS INDEX: 18.95 KG/M2 | HEIGHT: 62 IN | WEIGHT: 103 LBS | HEART RATE: 98 BPM | DIASTOLIC BLOOD PRESSURE: 58 MMHG | SYSTOLIC BLOOD PRESSURE: 104 MMHG

## 2023-02-08 DIAGNOSIS — N91.2 ABSENCE OF MENSTRUATION: ICD-10-CM

## 2023-02-08 DIAGNOSIS — N91.2 ABSENCE OF MENSTRUATION: Primary | ICD-10-CM

## 2023-02-08 LAB — HCG UR QL: POSITIVE

## 2023-02-08 PROCEDURE — 76801 OB US < 14 WKS SINGLE FETUS: CPT

## 2023-02-08 PROCEDURE — 99203 OFFICE O/P NEW LOW 30 MIN: CPT | Performed by: MIDWIFE

## 2023-02-08 PROCEDURE — 81025 URINE PREGNANCY TEST: CPT | Performed by: MIDWIFE

## 2023-02-08 NOTE — PROGRESS NOTES
"Confirmation of Pregnancy visit  Pt is a new pt to the [facility] CN's    Assessment:       Single mother (lives with her mother)  History of mental health conditions-- bipolar disorder, ADHD, disruptive behavior disorder.  History of SI and alcohol intoxication--ER visit   History of asthma as a child       Plan:   1. Discussed working Estimated Date of Delivery 2023.  Will get US to confirm dating. Referral given.   2. Oriented to [facility] care; group and practice info reviewed.   3. 1st trimester teaching: encouraged well-balanced diet, pre-srini vitamins, vitamin D3 and omega 3 supplements, Calcium, iron. Walking for exercise. Discussed warning signs and when to call.   4. She will schedule her initial OB visit asap      Total time spent with patient 40 minutes, >50% counseling, education and coordination of care.   Subjective:     Kevin is a 22 year old y.o. -0-1-0.  Who presents for pregnancy confirmation. pregnancy is planned/desired.  Patient presents with her mother Claudia.  Patient states that she lives with her mother, father the baby is not involved and will not be present for the labor.  He knows about the pregnancy and will be supportive financially.  Contraception: Depo last used in July.      Current symptoms also include: breast tenderness, frequent urination, morning sickness and positive home pregnancy test.     22. She is certain of this date. Menstrual cycles are regular, occuring every 28 days.  Last period was normal.    Patient occupation: N/A  Medications: Prenatal vitamin only  Please see past medical history section.   Please see family history section.   General health/lifestyle:   Patient states she  always wears a seatbelt.  There are no firearms in the home   There are working fire detectors in the home.  No smoking or tobacco use.   In a typical week, exercise includes: none  Diet: \"Pretty good\" eats some meat and some dairy.  In a typical week " she drinks 0 alcoholic drinks.  Has used marijuana in the past, stopped 1 month ago  No physical, sexual or emotional abuse.   She is not in a relationship at this time--father of the baby is not involved and will not be present for the labor.  He is aware of the pregnancy and will be financially supportive.  Patient currently lives with her mother  Sexual history/family planning: Pregnant now  For birth control she uses: Last used Depo in July  Patient is  sexually active. In the last 3 months she has had 1 partner, in the last 12 months she has had 3 partners.  Her partners are male.  Types of sexual activity practiced: Vaginal, oral  Had history of chlamydia in June 2022--took meds and had a test of cure that was negative per patient also had BV in September 2022.  No symptoms now.  Last menstrual period: 11/20/2022  Her periods come every 28-30 days lasting 5-7 days.  They are usually heavy flow.  PMS includes bloating.  Patient does not want STI testing today.  Okay's STI for her IOB visit      Review of Systems  Denies bleeding, pain or cramping, abnormal vaginal discharge or dysuria.  Has some constipation.    Objective:     There were no vitals taken for this visit.   General: alert and no acute distress    FHT's pos 160 today  Lab Review  Urine HCG: positive

## 2023-02-09 ENCOUNTER — DOCUMENTATION ONLY (OUTPATIENT)
Dept: OBGYN | Facility: CLINIC | Age: 23
End: 2023-02-09
Payer: COMMERCIAL

## 2023-03-09 ENCOUNTER — TELEPHONE (OUTPATIENT)
Dept: MIDWIFE SERVICES | Facility: CLINIC | Age: 23
End: 2023-03-09

## 2023-03-09 NOTE — LETTER
03/09/23    Kevin Urena 2000  [address]  [place] [address]        To Whom it May Concern,    Kevin Urena is being followed by the [facility] for her pregnancy.  Patient's last menstrual period was 11/28/2022 (exact date).   Her Estimated Date of Delivery: Sep 4, 2023.  If you have any questions, please do not hesitate to contact our office at [phone].        Sincerely,        Elinor Morales CNM

## 2023-03-09 NOTE — TELEPHONE ENCOUNTER
Letter created and sent to patient via [employer].  Will ask [facility] support staff to print and place at  for patient .

## 2023-03-09 NOTE — TELEPHONE ENCOUNTER
Pt needs a letter that states her LMP and SOURAV. She would like the letter placed in [employer] and also a hard copy left at the . Please call her to let her know when this is ready.

## 2023-03-18 ENCOUNTER — NURSE TRIAGE (OUTPATIENT)
Dept: NURSING | Facility: CLINIC | Age: 23
End: 2023-03-18

## 2023-03-18 ENCOUNTER — OFFICE VISIT (OUTPATIENT)
Dept: URGENT CARE | Facility: URGENT CARE | Age: 23
End: 2023-03-18
Payer: COMMERCIAL

## 2023-03-18 VITALS
OXYGEN SATURATION: 99 % | SYSTOLIC BLOOD PRESSURE: 105 MMHG | TEMPERATURE: 98.1 F | WEIGHT: 106 LBS | BODY MASS INDEX: 19.39 KG/M2 | DIASTOLIC BLOOD PRESSURE: 63 MMHG | HEART RATE: 74 BPM

## 2023-03-18 DIAGNOSIS — B96.89 BACTERIAL VAGINOSIS: ICD-10-CM

## 2023-03-18 DIAGNOSIS — R30.0 DYSURIA: ICD-10-CM

## 2023-03-18 DIAGNOSIS — N30.00 ACUTE CYSTITIS WITHOUT HEMATURIA: Primary | ICD-10-CM

## 2023-03-18 DIAGNOSIS — Z11.3 SCREEN FOR STD (SEXUALLY TRANSMITTED DISEASE): ICD-10-CM

## 2023-03-18 DIAGNOSIS — N76.0 BACTERIAL VAGINOSIS: ICD-10-CM

## 2023-03-18 DIAGNOSIS — Z3A.15 15 WEEKS GESTATION OF PREGNANCY: ICD-10-CM

## 2023-03-18 DIAGNOSIS — N30.00 ACUTE CYSTITIS WITHOUT HEMATURIA: ICD-10-CM

## 2023-03-18 LAB
ALBUMIN UR-MCNC: 30 MG/DL
APPEARANCE UR: ABNORMAL
BACTERIA #/AREA URNS HPF: ABNORMAL /HPF
BILIRUB UR QL STRIP: NEGATIVE
CLUE CELLS: PRESENT
COLOR UR AUTO: YELLOW
GLUCOSE UR STRIP-MCNC: NEGATIVE MG/DL
HGB UR QL STRIP: NEGATIVE
KETONES UR STRIP-MCNC: NEGATIVE MG/DL
LEUKOCYTE ESTERASE UR QL STRIP: ABNORMAL
MUCOUS THREADS #/AREA URNS LPF: PRESENT /LPF
NITRATE UR QL: NEGATIVE
PH UR STRIP: 8 [PH] (ref 5–7)
RBC #/AREA URNS AUTO: ABNORMAL /HPF
SP GR UR STRIP: 1.02 (ref 1–1.03)
SQUAMOUS #/AREA URNS AUTO: ABNORMAL /LPF
TRICHOMONAS, WET PREP: ABNORMAL
UROBILINOGEN UR STRIP-ACNC: 0.2 E.U./DL
WBC #/AREA URNS AUTO: ABNORMAL /HPF
WBC CLUMPS #/AREA URNS HPF: PRESENT /HPF
WBC'S/HIGH POWER FIELD, WET PREP: ABNORMAL
YEAST, WET PREP: ABNORMAL

## 2023-03-18 PROCEDURE — 86803 HEPATITIS C AB TEST: CPT | Performed by: FAMILY MEDICINE

## 2023-03-18 PROCEDURE — 87210 SMEAR WET MOUNT SALINE/INK: CPT | Performed by: FAMILY MEDICINE

## 2023-03-18 PROCEDURE — 86780 TREPONEMA PALLIDUM: CPT | Performed by: FAMILY MEDICINE

## 2023-03-18 PROCEDURE — 87086 URINE CULTURE/COLONY COUNT: CPT | Performed by: FAMILY MEDICINE

## 2023-03-18 PROCEDURE — 81001 URINALYSIS AUTO W/SCOPE: CPT | Performed by: FAMILY MEDICINE

## 2023-03-18 PROCEDURE — 99214 OFFICE O/P EST MOD 30 MIN: CPT | Performed by: FAMILY MEDICINE

## 2023-03-18 PROCEDURE — 87491 CHLMYD TRACH DNA AMP PROBE: CPT | Performed by: FAMILY MEDICINE

## 2023-03-18 PROCEDURE — 87088 URINE BACTERIA CULTURE: CPT | Performed by: FAMILY MEDICINE

## 2023-03-18 PROCEDURE — 87389 HIV-1 AG W/HIV-1&-2 AB AG IA: CPT | Performed by: FAMILY MEDICINE

## 2023-03-18 PROCEDURE — 87186 SC STD MICRODIL/AGAR DIL: CPT | Performed by: FAMILY MEDICINE

## 2023-03-18 PROCEDURE — 87591 N.GONORRHOEAE DNA AMP PROB: CPT | Performed by: FAMILY MEDICINE

## 2023-03-18 PROCEDURE — 36415 COLL VENOUS BLD VENIPUNCTURE: CPT | Performed by: FAMILY MEDICINE

## 2023-03-18 RX ORDER — CEFDINIR 300 MG/1
300 CAPSULE ORAL 2 TIMES DAILY
Qty: 10 CAPSULE | Refills: 0 | Status: SHIPPED | OUTPATIENT
Start: 2023-03-18 | End: 2023-03-18

## 2023-03-18 RX ORDER — CEFDINIR 300 MG/1
300 CAPSULE ORAL 2 TIMES DAILY
Qty: 10 CAPSULE | Refills: 0 | Status: SHIPPED | OUTPATIENT
Start: 2023-03-18 | End: 2023-07-11

## 2023-03-18 RX ORDER — METRONIDAZOLE 7.5 MG/G
1 GEL VAGINAL DAILY
Qty: 25 G | Refills: 0 | Status: SHIPPED | OUTPATIENT
Start: 2023-03-18 | End: 2023-03-18

## 2023-03-18 RX ORDER — METRONIDAZOLE 7.5 MG/G
1 GEL VAGINAL DAILY
Qty: 25 G | Refills: 0 | Status: SHIPPED | OUTPATIENT
Start: 2023-03-18 | End: 2023-06-19

## 2023-03-18 NOTE — TELEPHONE ENCOUNTER
Pt unable to get new RX for [facility] pharmacy and requesting RX be transferred to [facility] pharmacy.  Aneta Sen RN on 3/18/2023 at 1:59 PM      Reason for Disposition    [1] Prescription prescribed recently is not at pharmacy AND [2] triager has access to patient's EMR AND [3] prescription is recorded in the EMR    Protocols used: MEDICATION QUESTION CALL-A-AH

## 2023-03-18 NOTE — PATIENT INSTRUCTIONS
Take medication Cefdinir twice a day for 5 days to treat bladder infection    Symptoms should improve within the next 2-3 days. If no improvement, call clinic to discuss or return for re-evaluation    Drink approximately 60 ounces of water a day to stay hydrated.     If you develop flank pain, fevers, nausea/vomiting call immediately for assistance    --    Metrogel inserts for 4-5 days to treat bacterial vaginosis

## 2023-03-18 NOTE — PROGRESS NOTES
Assessment & Plan     Dysuria  - UA macro with reflex to Microscopic and Culture - Clinc Collect  - Wet prep - Clinic Collect  - Urine Microscopic Exam  - Urine Culture    Screen for STD (sexually transmitted disease)  - NEISSERIA GONORRHOEA PCR  - CHLAMYDIA TRACHOMATIS PCR  - Treponema Abs w Reflex to RPR and Titer  - Hepatitis C Screen Reflex to HCV RNA Quant and Genotype  - HIV Antigen Antibody Combo  - Hepatitis C Screen Reflex to HCV RNA Quant and Genotype    Bacterial vaginosis    Acute cystitis without hematuria    15 weeks gestation of pregnancy     Proceed with MetroGel for 5 days to treat bacterial vaginosis additionally will prescribe cefdinir as an antibiotic to treat bladder infection.  No symptoms of pyelonephritis at this present time.  No vaginal bleeding present.  Patient reports that she is setting up her appointment for her next prenatal visit and she maintains a daily prenatal vitamin at this time.    John Santana MD   [facility]    Crystal Brown is a 22 year old female who presents to clinic today for the following health issues:  Chief Complaint   Patient presents with     UTI     Urgent Care     C/O dysuria for 2 days     HPI    Patient comes in today with 2 days of painful urination she denies any vaginal discharge at this time reports she is currently 15 weeks pregnant.  She has had her first initial ultrasound but she has not yet done her first formal prenatal visit.  She does not drink alcohol.  Denies any fever or back pain.  No vomiting or diarrhea.  She would like STD screening done today including blood work.  Denies any exposures to STDs.  No vaginal bleeding.    Patient Active Problem List    Diagnosis Date Noted     Alcohol intoxication (H) 11/22/2021     Priority: Medium     Attention deficit hyperactivity disorder (ADHD)      Priority: Medium     Created by Conversion         Disruptive Behavior Disorder      Priority: Medium     Created by  Conversion  Replacement Utility updated for latest IMO load         Allergic Rhinitis      Priority: Medium     Created by Conversion  Replacement Utility updated for latest IMO load         Autistic Disorder      Priority: Medium     Created by Conversion  Replacement Utility updated for latest IMO load         Bipolar Disorder NOS      Priority: Medium     Created by Conversion           Current Outpatient Medications   Medication     cefdinir (OMNICEF) 300 MG capsule     metroNIDAZOLE (METROGEL) 0.75 % vaginal gel     No current facility-administered medications for this visit.           Objective    /63   Pulse 74   Temp 98.1  F (36.7  C) (Tympanic)   Wt 48.1 kg (106 lb)   LMP 11/28/2022 (Exact Date)   SpO2 99%   BMI 19.39 kg/m    Physical Exam   GEN: NAD, appears age,   Gait: normal    Results for orders placed or performed in visit on 03/18/23   UA macro with reflex to Microscopic and Culture - Clinc Collect     Status: Abnormal    Specimen: Urine, Clean Catch   Result Value Ref Range    Color Urine Yellow Colorless, Straw, Light Yellow, Yellow    Appearance Urine Cloudy (A) Clear    Glucose Urine Negative Negative mg/dL    Bilirubin Urine Negative Negative    Ketones Urine Negative Negative mg/dL    Specific Gravity Urine 1.020 1.003 - 1.035    Blood Urine Negative Negative    pH Urine 8.0 (H) 5.0 - 7.0    Protein Albumin Urine 30 (A) Negative mg/dL    Urobilinogen Urine 0.2 0.2, 1.0 E.U./dL    Nitrite Urine Negative Negative    Leukocyte Esterase Urine Large (A) Negative   Urine Microscopic Exam     Status: Abnormal   Result Value Ref Range    Bacteria Urine Many (A) None Seen /HPF    RBC Urine 0-2 0-2 /HPF /HPF    WBC Urine 25-50 (A) 0-5 /HPF /HPF    Squamous Epithelials Urine Few (A) None Seen /LPF    WBC Clumps Urine Present (A) None Seen /HPF    Mucus Urine Present (A) None Seen /LPF   Wet prep - Clinic Collect     Status: Abnormal    Specimen: Vagina; Swab   Result Value Ref Range     Trichomonas Absent Absent    Yeast Absent Absent    Clue Cells Present (A) Absent    WBCs/high power field None None                     The use of Dragon/[employer] services may have been used to construct the content in this note; any grammatical or spelling errors are non-intentional. Please contact the author of this note directly if you are in need of any clarification.

## 2023-03-19 LAB
BACTERIA UR CULT: ABNORMAL
C TRACH DNA SPEC QL NAA+PROBE: NEGATIVE
N GONORRHOEA DNA SPEC QL NAA+PROBE: NEGATIVE
T PALLIDUM AB SER QL: NONREACTIVE

## 2023-03-20 ENCOUNTER — NURSE TRIAGE (OUTPATIENT)
Dept: NURSING | Facility: CLINIC | Age: 23
End: 2023-03-20
Payer: COMMERCIAL

## 2023-03-20 LAB
HCV AB SERPL QL IA: NONREACTIVE
HIV 1+2 AB+HIV1 P24 AG SERPL QL IA: NONREACTIVE

## 2023-03-20 NOTE — TELEPHONE ENCOUNTER
Seen on 3-18-23 by John Santana at [place] urgent [facility].  [place] pharmacy calling. Order needs clarification on Metrogel prescription. It doesn't come in 5 G tube. It's to be 70 grams, a whole box. Usually one every night at bedtime for five days. Please call the pharmacy at:  [phone].  Apoorva Farley RN  [facility]    Reason for Disposition    [1] Pharmacy calling with prescription question AND [2] triager unable to answer question    Additional Information    Negative: [1] Intentional drug overdose AND [2] suicidal thoughts or ideas    Negative: Drug overdose and triager unable to answer question    Negative: Caller requesting a renewal or refill of a medicine patient is currently taking    Negative: Caller requesting information unrelated to medicine    Negative: Caller requesting information about COVID-19 Vaccine    Negative: Caller requesting information about Emergency Contraception    Negative: Caller requesting information about Combined Birth Control Pills    Negative: Caller requesting information about Progestin Birth Control Pills    Negative: Caller requesting information about Post-Op pain or medicines    Negative: Caller requesting a prescription antibiotic (such as Penicillin) for Strep throat and has a positive culture result    Negative: Caller requesting a prescription anti-viral med (such as Tamiflu) and has influenza (flu) symptoms    Negative: Immunization reaction suspected    Negative: Rash while taking a medicine or within 3 days of stopping it    Negative: [1] Asthma and [2] having symptoms of asthma (cough, wheezing, etc.)    Negative: [1] Symptom of illness (e.g., headache, abdominal pain, earache, vomiting) AND [2] more than mild    Negative: Breastfeeding questions about mother's medicines and diet    Negative: MORE THAN A DOUBLE DOSE of a prescription or over-the-counter (OTC) drug    Negative: [1] DOUBLE DOSE (an extra dose or lesser amount) of  prescription drug AND [2] any symptoms (e.g., dizziness, nausea, pain, sleepiness)    Negative: [1] DOUBLE DOSE (an extra dose or lesser amount) of over-the-counter (OTC) drug AND [2] any symptoms (e.g., dizziness, nausea, pain, sleepiness)    Negative: Took another person's prescription drug    Negative: [1] Prescription not at pharmacy AND [2] was prescribed by PCP recently (Exception: triager has access to EMR and prescription is recorded there. Go to Home Care and confirm for pharmacy.)    Negative: Diabetes drug error or overdose (e.g., took wrong type of insulin or took extra dose)    Negative: [1] DOUBLE DOSE (an extra dose or lesser amount) of prescription drug AND [2] NO symptoms (Exception: a double dose of antibiotics)    Protocols used: MEDICATION QUESTION CALL-A-

## 2023-04-23 ENCOUNTER — HEALTH MAINTENANCE LETTER (OUTPATIENT)
Age: 23
End: 2023-04-23

## 2023-06-15 ENCOUNTER — HOSPITAL ENCOUNTER (EMERGENCY)
Facility: HOSPITAL | Age: 23
Discharge: HOME | End: 2023-06-15
Attending: EMERGENCY MEDICINE | Admitting: EMERGENCY MEDICINE
Payer: COMMERCIAL

## 2023-06-15 VITALS
OXYGEN SATURATION: 100 % | BODY MASS INDEX: 21.14 KG/M2 | HEART RATE: 85 BPM | SYSTOLIC BLOOD PRESSURE: 123 MMHG | TEMPERATURE: 97.5 F | WEIGHT: 112 LBS | RESPIRATION RATE: 16 BRPM | DIASTOLIC BLOOD PRESSURE: 68 MMHG | HEIGHT: 61 IN
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
## 2023-06-15 DIAGNOSIS — S40.862A INFECTED INSECT BITE OF LEFT UPPER EXTREMITY, INITIAL ENCOUNTER: ICD-10-CM

## 2023-06-15 DIAGNOSIS — L08.9 INFECTED INSECT BITE OF LEFT UPPER EXTREMITY, INITIAL ENCOUNTER: ICD-10-CM

## 2023-06-15 DIAGNOSIS — L03.129: ICD-10-CM

## 2023-06-15 PROCEDURE — 250N000013 HC RX MED GY IP 250 OP 250 PS 637: Performed by: EMERGENCY MEDICINE

## 2023-06-15 PROCEDURE — 99284 EMERGENCY DEPT VISIT MOD MDM: CPT

## 2023-06-15 RX ORDER — CEPHALEXIN 500 MG/1
500 CAPSULE ORAL 4 TIMES DAILY
Qty: 40 CAPSULE | Refills: 0 | Status: SHIPPED | OUTPATIENT
Start: 2023-06-15 | End: 2023-06-25

## 2023-06-15 RX ORDER — CEPHALEXIN 500 MG/1
500 CAPSULE ORAL ONCE
Status: COMPLETED | OUTPATIENT
Start: 2023-06-15 | End: 2023-06-15

## 2023-06-15 RX ORDER — CLINDAMYCIN HCL 150 MG
450 CAPSULE ORAL ONCE
Status: COMPLETED | OUTPATIENT
Start: 2023-06-15 | End: 2023-06-15

## 2023-06-15 RX ORDER — CLINDAMYCIN HCL 300 MG
300 CAPSULE ORAL 4 TIMES DAILY
Qty: 40 CAPSULE | Refills: 0 | Status: SHIPPED | OUTPATIENT
Start: 2023-06-15 | End: 2023-06-25

## 2023-06-15 RX ADMIN — CLINDAMYCIN HYDROCHLORIDE 450 MG: 150 CAPSULE ORAL at 23:30

## 2023-06-15 RX ADMIN — CEPHALEXIN 500 MG: 500 CAPSULE ORAL at 23:30

## 2023-06-15 ASSESSMENT — ENCOUNTER SYMPTOMS
WEAKNESS: 0
PALPITATIONS: 0
FEVER: 0
CHILLS: 0
SHORTNESS OF BREATH: 0
NUMBNESS: 0

## 2023-06-15 ASSESSMENT — ACTIVITIES OF DAILY LIVING (ADL): ADLS_ACUITY_SCORE: 33

## 2023-06-16 NOTE — ED PROVIDER NOTES
EMERGENCY DEPARTMENT ENCOUNTER      NAME: Kevin Urena  AGE: 22 year old female  YOB: 2000  MRN: [id number]  EVALUATION DATE & TIME: 6/15/2023 10:11 PM    PCP: No Ref-Primary, Physician        Chief Complaint   Patient presents with     Wound Infection         IMPRESSION  1. Infected insect bite of left upper extremity, initial encounter    2. Acute lymphangitis of upper arm        PLAN  - Keflex & clindamycin QID x10 days  - close PCP follow up  - discharge to home    ED COURSE & MEDICAL DECISION MAKING    ED Course as of 06/15/23 2342   Thu Graeme 15, 2023   2325 22yoF currently 7mo pregnant per her report presenting with infected bug bite; states was bitten yesterday to left AC area and today has increased redness surrounding the area with mild proximal lympangitic spread. No systemic symptoms including fevers, sweats, chills, nausea, vomiting. No pain. No crepitus, fluctuance, drainage, tenderness on exam; no concern for necrotizing fasciitis, sepsis, abscess. Blood tests would not . Ok for trial of PO antibiotics. Given 1st dose of Keflex & clindamycin here now and prescription for home. Patient comfortable with this plan and discharge home at this time. Return precautions and need for PCP follow up discussed and understood. No further questions at the time of discharge.       11:07 PM I was consulted by Zaynab Verdin PA-C on this patient. I reviewed ED presentation history, assessment, management, plan to this point. Please see ED KARYN note for details.  11:27 PM I met with and evaluated the patient.      This patient involved a high degree of complexity in medical decision making, as significant risks were present and assessed. Recent encounters & results in medical record reviewed by me.    All workup (i.e. any EKG/labs/imaging as per charting below) reviewed and independently interpreted by me. See respective sections for details.    Broad differential considered for  "this patient presenting, including but not limited to:  Cellulitis, lymphangitis, necrotizing fasciitis, sepsis, abscess, septic joint, bacteremia, IVDU, compartment syndrome    I personally saw the patient and performed a substantive portion of the visit including all aspects of the medical decision making.    MEDICATIONS GIVEN IN THE EMERGENCY DEPARTMENT  Medications   cephALEXin (KEFLEX) capsule 500 mg (500 mg Oral $Given 6/15/23 2330)   clindamycin (CLEOCIN) capsule 450 mg (450 mg Oral $Given 6/15/23 2330)       NEW PRESCRIPTIONS STARTED AT TODAY'S ER VISIT  Discharge Medication List as of 6/15/2023 11:25 PM      START taking these medications    Details   cephALEXin (KEFLEX) 500 MG capsule Take 1 capsule (500 mg) by mouth 4 times daily for 10 days, Disp-40 capsule, R-0, Local Print      clindamycin (CLEOCIN) 300 MG capsule Take 1 capsule (300 mg) by mouth 4 times daily for 10 days, Disp-40 capsule, R-0, Local Print         CONTINUE these medications which have NOT CHANGED    Details   cefdinir (OMNICEF) 300 MG capsule Take 1 capsule (300 mg) by mouth 2 times daily, Disp-10 capsule, R-0, E-Prescribe      metroNIDAZOLE (METROGEL) 0.75 % vaginal gel Place 1 applicator (5 g) vaginally dailyDisp-25 g, [id number]                 =================================================================      HPI  Patient information was obtained from: Patient    Use of : N/A      Kevin BROOKLYN Urena is a 22 year old female who is currently 28 weeks pregnant with a pertinent history of asthma who presents to this ED by walk in for evaluation of a wound.    The patient reports she noticed a small red bump on her inner left elbow last night. Initially, the bump was itchy. This morning the bump was noticeably larger and there was red streaking up her arm. She reports a \"numbing pain\" at the site of the bump. She has not had any fever or chills.    She denies IV drug use, MRSA history, recent travel, or tick " exposures.    --------------- MEDICAL HISTORY ---------------  PAST MEDICAL HISTORY:  Reviewed by me.  Past Medical History:   Diagnosis Date     Asthma     Created by Conversion      Patient Active Problem List   Diagnosis     Alcohol intoxication (H)     Disruptive Behavior Disorder     Attention deficit hyperactivity disorder (ADHD)     Allergic Rhinitis     Bipolar Disorder NOS     Autistic Disorder       PAST SURGICAL HISTORY:  Reviewed by me.  No past surgical history on file.    CURRENT MEDICATIONS:    Reviewed by me.  No current facility-administered medications for this encounter.    Current Outpatient Medications:      cephALEXin (KEFLEX) 500 MG capsule, Take 1 capsule (500 mg) by mouth 4 times daily for 10 days, Disp: 40 capsule, Rfl: 0     clindamycin (CLEOCIN) 300 MG capsule, Take 1 capsule (300 mg) by mouth 4 times daily for 10 days, Disp: 40 capsule, Rfl: 0     cefdinir (OMNICEF) 300 MG capsule, Take 1 capsule (300 mg) by mouth 2 times daily, Disp: 10 capsule, Rfl: 0     metroNIDAZOLE (METROGEL) 0.75 % vaginal gel, Place 1 applicator (5 g) vaginally daily, Disp: 25 g, Rfl: 0    ALLERGIES:  Reviewed by me.  Allergies   Allergen Reactions     Azithromycin Unknown     Doxycycline Hives     Zithromax [Azithromycin] Hives     States no issues with airway, breathing or swallowing concerns ever in the past       FAMILY HISTORY:  Reviewed by me.  Family History   Problem Relation Age of Onset     Cervical Cancer Mother      No Known Problems Father      Diabetes Maternal Grandmother      Hyperlipidemia Maternal Grandmother      Heart Disease Maternal Grandmother      Asthma Sister      Allergies Sister        SOCIAL HISTORY:   Reviewed by me.  Social History     Socioeconomic History     Marital status: Single   Tobacco Use     Smoking status: Never     Smokeless tobacco: Never   Substance and Sexual Activity     Alcohol use: Not Currently     Comment: intoxicated upon arrival to ED today     Drug use: Not  "Currently         --------------- PHYSICAL EXAM ---------------  Nursing notes and vitals independently reviewed by me.  VITALS:  Vitals:    06/15/23 2204 06/15/23 2316   BP: 122/75 123/68   Pulse: 92 85   Resp: 16    Temp: 97.5  F (36.4  C)    TempSrc: Temporal    SpO2: 100% 100%   Weight: 50.8 kg (112 lb)    Height: 1.549 m (5' 1\")        PHYSICAL EXAM:    General:  alert, interactive, no distress  Eyes:  conjunctivae clear, conjugate gaze  HENT:  atraumatic, nose with no rhinorrhea, oropharynx clear  Neck:  no meningismus  Cardiovascular:  HR 80's during exam, regular rhythm, no murmurs, brisk cap refill  Chest:  no chest wall tenderness  Pulmonary:  no stridor, normal phonation, normal work of breathing, clear lungs bilaterally  Abdomen: soft, gravid, nontender  :  no CVA tenderness  Back:  no midline spinal tenderness  Musculoskeletal:  no pretibial edema, no calf tenderness. Gross ROM intact to joints of extremities with no obvious deformities.  Skin:  warm, dry, approximately 3 cm diameter are of redness to the left AC with small central blanching, associated mild proximal lymphangitic spread to mid upper arm, no crepitus, no fluctuance, no drainage, no tenderness, pain with ROM of elbow  Neuro:  awake, alert, answers questions appropriately, follows commands, moves all limbs  Psych:  calm, normal affect                  --------------- ADDITIONAL MDM ---------------  History:  - Supplemental history from:       -- see above charting, if applicable: patient, family (significant other)  - External Record(s) reviewed:       -- see above charting, if applicable       -- Inpatient/outpatient record (clinic visit 3.18/23), prior labs (blood/urine/swabs 3/18/23), prior imaging (US 2/8/23)    Workup:  - Chart documentation above includes differential considered and any EKGs or imaging independently interpreted by provider.  - In additional to work up documented, I considered the following work up:       -- see " above charting, if applicable    External Consultation:  - Discussion of management with another provider:       -- see above charting, if applicable    Complicating Factors:  - Care impacted by chronic illness:       -- see above MDM, past medical history, & problem list  - Care affected by social determinants of health:       -- see above social history    Disposition Considerations:  - Discharge       -- I considered admission given that the patient came to the Emergency Department, but ultimately discharged the patient per decision making above       -- I recommended the patient continue their current prescription strength medication(s) as charted above in current medications list       -- I prescribed prescription strength medication(s) as charted above       -- I recommended over-the-counter medication(s) as charted above & in discharge instructions         I, Russell Dorantes, am serving as a scribe to document services personally performed by Dr. Richard Aragon based on my observation and the provider's statements to me. I, Richard Aragon MD attest that Russell Dorantes is acting in a scribe capacity, has observed my performance of the services and has documented them in accordance with my direction.      Richard Aragon MD  06/15/23  Emergency Medicine  [facility] EMERGENCY DEPARTMENT  [address] [place] [id number]  [phone]  Dept: [phone]     Richard Aragon MD  06/15/23 [id number]

## 2023-06-16 NOTE — DISCHARGE INSTRUCTIONS
You were seen in the ER for evaluation of infected insect bite. You were prescribed two antibiotics, Keflex and Clindamycin - please take these as directed.    Please follow up with your primary care provider or OB/GYN for reevaluation early next week, or sooner as needed.    Return to the ER if any new or worsening symptoms develop including increased redness, warmth, pus-like drainage, swelling of arm, fevers/chills, palpitations, chest pain, shortness of breath, or any other concerning symptoms.

## 2023-06-16 NOTE — ED TRIAGE NOTES
"Patient arrives from home.   Noticed a small red welt on inner left elbow yesterday evening.   Today \"welt\" growing bigger and patient noticed a red streak going up arm from welt.     Quarter size puffy red welt with red streak going up arm towards should noted in triage.       Denies fevers.     Patient is twenty-eight weeks pregnant at this time.       "

## 2023-06-16 NOTE — ED PROVIDER NOTES
EMERGENCY DEPARTMENT ENCOUNTER      NAME: Kevin Urena  AGE: 22 year old female  YOB: 2000  MRN: [id number]  EVALUATION DATE & TIME: 6/15/2023 10:11 PM    PCP: No Ref-Primary, Physician    ED PROVIDER: Zaynab Verdin PA-C      Chief Complaint   Patient presents with     Wound Infection         FINAL IMPRESSION:  1. Infected insect bite of left upper extremity, initial encounter    2. Acute lymphangitis of upper arm          ED COURSE & MEDICAL DECISION MAKING:    10:47 PM I introduced myself to the patient, obtained patient history, performed a physical exam, and discussed plan for ED workup including potential diagnostic laboratory/imaging studies and interventions.  11:04 PM Staffed patient with Dr. Aragon   11:23 PM Rechecked and updated the patient. We discussed the plan for discharge and the patient is agreeable. Reviewed supportive cares, symptomatic treatment, outpatient follow up, and reasons to return to the Emergency Department. Patient to be discharged by ED RN.     Pertinent Labs & Imaging studies reviewed. (See chart for details)  22 year old female 28 weeks gestation presents to the Emergency Department for evaluation of insect bite with new onset erythema and streaking. Upon exam, patient is afebrile, hemodynamically stable, and in no acute distress. Erythema with streaking up mid-upper arm as pictured below without evidence of purulence, fluctuance, crepitus, or tenderness to palpation. Low suspicion for sepsis given overall reassuring vitals and physical exam; therefore laboratories and blood cultures deferred. No evidence of drainable abscess.     Patient declined pain medication upon arrival. Symptoms and workup most consistent with cellulitis secondary to presumed insect bite. Patient was made aware of the above findings. Plan to discharge patient home with prescription Keflex and Clindamycin, strict return precautions, and close follow up with their PCP for reevaluation  and ongoing management. The patient was stable and well appearing upon discharge. The patient was advised to return to the ER if any new or worsening symptoms develop. The patient verbalizes understanding and agrees with the plan.     Medical Decision Making    History:    Supplemental history from: Documented in chart, if applicable    External Record(s) reviewed: Documented in chart, if applicable.    Work Up:    Chart documentation includes differential considered and any EKGs or imaging independently interpreted by provider, where specified.    In additional to work up documented, I considered the following work up: Documented in chart, if applicable.    External consultation:    Discussion of management with another provider: Documented in chart, if applicable    Complicating factors:    Care impacted by chronic illness: Other: pregnancy    Care affected by social determinants of health: N/A    Disposition considerations: Discharge. I prescribed additional prescription strength medication(s) as charted. See documentation for any additional details.        MEDICATIONS GIVEN IN THE EMERGENCY:  Medications   cephALEXin (KEFLEX) capsule 500 mg (500 mg Oral $Given 6/15/23 2330)   clindamycin (CLEOCIN) capsule 450 mg (450 mg Oral $Given 6/15/23 2330)       NEW PRESCRIPTIONS STARTED AT TODAY'S ER VISIT  Discharge Medication List as of 6/15/2023 11:25 PM      START taking these medications    Details   cephALEXin (KEFLEX) 500 MG capsule Take 1 capsule (500 mg) by mouth 4 times daily for 10 days, Disp-40 capsule, R-0, Local Print      clindamycin (CLEOCIN) 300 MG capsule Take 1 capsule (300 mg) by mouth 4 times daily for 10 days, Disp-40 capsule, R-0, Local Print                =================================================================    HPI    Patient information was obtained from: Patient     Use of : N/A        Kevin BARAHONA Romel is a 22 year old female with no pertinent medical history who  "presents to this ED via walk in for evaluation of wound infection.    Patient is 28 weeks pregnant and reports a small red bump on her inner left elbow yesterday night. States that the bump was itchy and started off small, but got worse this morning. She endorses pain from the bump and notes that it feels like \"numbing pain\". Reports that the bump got bigger this morning with red streak radiating up her arm which prompted her to present to the ED for further evaluation. Denies any recent chest pain, shortness of breath, fever, chills, or palpitations. No IV drug use. No history of MRSA, abscess or skin infections. No recent forest travels. No recent tic exposures. Patient is allergic to amoxicillin.       REVIEW OF SYSTEMS   Review of Systems   Constitutional: Negative for chills and fever.   Respiratory: Negative for shortness of breath.    Cardiovascular: Negative for chest pain and palpitations.   Skin: Positive for rash (Left inner elbow ).   Neurological: Negative for weakness and numbness.        PAST MEDICAL HISTORY:  Past Medical History:   Diagnosis Date     Asthma     Created by Conversion        PAST SURGICAL HISTORY:  No past surgical history on file.        CURRENT MEDICATIONS:    cephALEXin (KEFLEX) 500 MG capsule  clindamycin (CLEOCIN) 300 MG capsule  cefdinir (OMNICEF) 300 MG capsule  metroNIDAZOLE (METROGEL) 0.75 % vaginal gel        ALLERGIES:  Allergies   Allergen Reactions     Azithromycin Unknown     Doxycycline Hives     Zithromax [Azithromycin] Hives     States no issues with airway, breathing or swallowing concerns ever in the past       FAMILY HISTORY:  Family History   Problem Relation Age of Onset     Cervical Cancer Mother      No Known Problems Father      Diabetes Maternal Grandmother      Hyperlipidemia Maternal Grandmother      Heart Disease Maternal Grandmother      Asthma Sister      Allergies Sister        SOCIAL HISTORY:   Social History     Socioeconomic History     Marital " "status: Single   Tobacco Use     Smoking status: Never     Smokeless tobacco: Never   Substance and Sexual Activity     Alcohol use: Not Currently     Comment: intoxicated upon arrival to ED today     Drug use: Not Currently       VITALS:  /68   Pulse 85   Temp 97.5  F (36.4  C) (Temporal)   Resp 16   Ht 1.549 m (5' 1\")   Wt 50.8 kg (112 lb)   LMP 11/28/2022 (Exact Date)   SpO2 100%   BMI 21.16 kg/m      PHYSICAL EXAM    Constitutional:  Alert, in no acute distress. Cooperative.  EYES: Conjunctivae clear.  HENT:  Atraumatic, normocephalic.  Respiratory:  Respirations even, unlabored, in no acute respiratory distress.  Cardiovascular:  Regular rate, good peripheral perfusion.  GI: Soft, flat, non-distended.  Musculoskeletal: Left upper extremity: Erythema with streaking up mid-upper arm as pictured below without evidence of purulence, fluctuance, crepitus, or tenderness to palpation. Full ROM at elbow with mild pain. Neurovascularly intact distally. Cap refill <2 seconds. 2+ radial pulse. 5/5 strength. Compartments soft.  Integument: Warm, Dry.   Neurologic:  Alert & oriented. No focal deficits noted.  Psych: Normal mood and affect.      I, Dov Sanz, am serving as a scribe to document services personally performed by Zaynab Verdin PA-C based on my observation and the provider's statements to me. IZaynab PA-C, attest that Dov Sanz is acting in a scribe capacity, has observed my performance of the services and has documented them in accordance with my direction.    Zaynab Verdin PA-C  [facility] EMERGENCY DEPARTMENT  [address] [place] [id number]  [phone]      Zaynab Verdin PA-C  06/15/23 4030    "

## 2023-06-16 NOTE — ED NOTES
Pt discharged from ED to home ambulatory with SO. Patient verbalized understanding of discharge instructions and recommended follow up care as noted on discharge instructions.  Written discharge instructions given, denies any further questions. Pt agreeable to discharge plan.

## 2023-06-18 LAB
ABO/RH(D): NORMAL
ANTIBODY SCREEN: NEGATIVE
SPECIMEN EXPIRATION DATE: NORMAL

## 2023-06-19 ENCOUNTER — PRE VISIT (OUTPATIENT)
Dept: MATERNAL FETAL MEDICINE | Facility: CLINIC | Age: 23
End: 2023-06-19

## 2023-06-19 ENCOUNTER — TRANSCRIBE ORDERS (OUTPATIENT)
Dept: MATERNAL FETAL MEDICINE | Facility: CLINIC | Age: 23
End: 2023-06-19

## 2023-06-19 ENCOUNTER — PRENATAL OFFICE VISIT (OUTPATIENT)
Dept: MIDWIFE SERVICES | Facility: CLINIC | Age: 23
End: 2023-06-19
Payer: COMMERCIAL

## 2023-06-19 VITALS
HEIGHT: 61 IN | DIASTOLIC BLOOD PRESSURE: 70 MMHG | OXYGEN SATURATION: 96 % | WEIGHT: 116.7 LBS | HEART RATE: 89 BPM | BODY MASS INDEX: 22.03 KG/M2 | SYSTOLIC BLOOD PRESSURE: 124 MMHG

## 2023-06-19 DIAGNOSIS — O26.90 PREGNANCY RELATED CONDITION, ANTEPARTUM: Primary | ICD-10-CM

## 2023-06-19 DIAGNOSIS — Z34.83 ENCOUNTER FOR SUPERVISION OF OTHER NORMAL PREGNANCY IN THIRD TRIMESTER: Primary | ICD-10-CM

## 2023-06-19 LAB
ERYTHROCYTE [DISTWIDTH] IN BLOOD BY AUTOMATED COUNT: 12.8 % (ref 10–15)
GLUCOSE 1H P 50 G GLC PO SERPL-MCNC: 85 MG/DL (ref 70–129)
HBV SURFACE AG SERPL QL IA: NONREACTIVE
HCT VFR BLD AUTO: 35.9 % (ref 35–47)
HGB BLD-MCNC: 11.8 G/DL (ref 11.7–15.7)
MCH RBC QN AUTO: 30.4 PG (ref 26.5–33)
MCHC RBC AUTO-ENTMCNC: 32.9 G/DL (ref 31.5–36.5)
MCV RBC AUTO: 93 FL (ref 78–100)
PLATELET # BLD AUTO: 238 10E3/UL (ref 150–450)
RBC # BLD AUTO: 3.88 10E6/UL (ref 3.8–5.2)
WBC # BLD AUTO: 6.1 10E3/UL (ref 4–11)

## 2023-06-19 PROCEDURE — 86901 BLOOD TYPING SEROLOGIC RH(D): CPT | Performed by: ADVANCED PRACTICE MIDWIFE

## 2023-06-19 PROCEDURE — 86762 RUBELLA ANTIBODY: CPT | Performed by: ADVANCED PRACTICE MIDWIFE

## 2023-06-19 PROCEDURE — 86787 VARICELLA-ZOSTER ANTIBODY: CPT | Performed by: ADVANCED PRACTICE MIDWIFE

## 2023-06-19 PROCEDURE — 86850 RBC ANTIBODY SCREEN: CPT | Performed by: ADVANCED PRACTICE MIDWIFE

## 2023-06-19 PROCEDURE — 36415 COLL VENOUS BLD VENIPUNCTURE: CPT | Performed by: ADVANCED PRACTICE MIDWIFE

## 2023-06-19 PROCEDURE — 87340 HEPATITIS B SURFACE AG IA: CPT | Performed by: ADVANCED PRACTICE MIDWIFE

## 2023-06-19 PROCEDURE — 99214 OFFICE O/P EST MOD 30 MIN: CPT | Performed by: ADVANCED PRACTICE MIDWIFE

## 2023-06-19 PROCEDURE — 85027 COMPLETE CBC AUTOMATED: CPT | Performed by: ADVANCED PRACTICE MIDWIFE

## 2023-06-19 PROCEDURE — 86900 BLOOD TYPING SEROLOGIC ABO: CPT | Performed by: ADVANCED PRACTICE MIDWIFE

## 2023-06-19 PROCEDURE — 82950 GLUCOSE TEST: CPT | Performed by: ADVANCED PRACTICE MIDWIFE

## 2023-06-19 RX ORDER — VALACYCLOVIR HYDROCHLORIDE 1 G/1
1 TABLET, FILM COATED ORAL
COMMUNITY
Start: 2022-09-13 | End: 2023-06-19

## 2023-06-19 RX ORDER — GLYCERIN/MINERAL OIL
LOTION (ML) TOPICAL
COMMUNITY
Start: 2023-01-19 | End: 2023-07-11

## 2023-06-19 RX ORDER — PRENATAL VIT/IRON FUM/FOLIC AC 27MG-0.8MG
1 TABLET ORAL DAILY
Qty: 90 TABLET | Refills: 3 | Status: SHIPPED | OUTPATIENT
Start: 2023-06-19 | End: 2024-01-11

## 2023-06-19 ASSESSMENT — ANXIETY QUESTIONNAIRES
6. BECOMING EASILY ANNOYED OR IRRITABLE: NEARLY EVERY DAY
5. BEING SO RESTLESS THAT IT IS HARD TO SIT STILL: NOT AT ALL
GAD7 TOTAL SCORE: 4
GAD7 TOTAL SCORE: 4
3. WORRYING TOO MUCH ABOUT DIFFERENT THINGS: NOT AT ALL
2. NOT BEING ABLE TO STOP OR CONTROL WORRYING: SEVERAL DAYS
7. FEELING AFRAID AS IF SOMETHING AWFUL MIGHT HAPPEN: NOT AT ALL
1. FEELING NERVOUS, ANXIOUS, OR ON EDGE: NOT AT ALL

## 2023-06-19 ASSESSMENT — PATIENT HEALTH QUESTIONNAIRE - PHQ9
SUM OF ALL RESPONSES TO PHQ QUESTIONS 1-9: 4
5. POOR APPETITE OR OVEREATING: NOT AT ALL

## 2023-06-19 NOTE — PROGRESS NOTES
29w0d   Kevin Urena is a 22 year old who presents to the clinic for an new ob visit. She is not a previous CNM patient. She is late to care at 29 weeks. She has had two visits, one ultrasound for dating which can be seen in [facility] and one new OB visit done in [facility]. She is going to sign a JAZZ for us to get those records but would like to get her labs done here even if they are repeated. She had a pap smear and GC/CT done there, all of which are normal. Declined repeated GC/CT today, also done in March and negative. She had some labs done in [place] in March which can also be seen in epic. She did not have any formal ultrasounds. She paid for one out of pocket just for fun, found out they are having a boy, name will be Cyrus. [facility] ultrasound ordered. Declined genetic testing. GCT today. Declined tdap, declines any vaccinations, does not believe in them. She would like testing for varicella, unsure on immunity status, even if she is not going to get the vaccination. She has a history of mental health. She is currently not on medications and feels like she is doing well. She would like to establish with at therapist, referral sent to our T team. She desires a water birth. She declines any male providers. She is here with partner Rakan. He is from [facility], they met there. They moved to [place] together where she is from.      Estimated Date of Delivery: Sep 3, 2023 is calculated from Patient's last menstrual period was 11/27/2022 (exact date).     She has not had bleeding since her LMP.   She has had mild nausea. Weigh loss has not occurred.   FOB is involved, Rakan   OTHER CONCERNS: none     INFECTION HISTORY  HIV: no  Hepatitis B: no  Hepatitis C: no  Syphilis:  no  Tuberculosis: no   PPD- no  Herpes self: no  Herpes partner:  no  Chlamydia:  yes  Gonorrhea:  no  HPV: no  BV:  yes  Trichomonis:  no  Chicken Pox:  unsure  ====================================================  GENETIC SCREENING  Genetic screening reviewed.  "High Risk? None   ====================================================  PERSONAL/SOCIAL HISTORY  Lives with partner.  Employment: Student; GED.  Her job involves light activity .  HX OF ABUSE: no  =====================================================   REVIEW OF SYSTEMS  CONSTITUTIONAL: NEGATIVE for fever, chills  EYES: NEGATIVE for vision changes   RESP: NEGATIVE for significant cough or SOB  CV: NEGATIVE for chest pain, palpitations   GI: NEGATIVE for nausea, abdominal pain, heartburn, or change in bowel habits  : NEGATIVE for frequency, dysuria, or hematuria  MUSCULOSKELETAL: NEGATIVE for significant arthralgias or myalgia  NEURO: NEGATIVE for weakness, dizziness or paresthesias or headache  ====================================================    PHYSICAL EXAM:  /70   Pulse 89   Ht 1.549 m (5' 1\")   Wt 52.9 kg (116 lb 11.2 oz)   LMP 11/27/2022 (Exact Date)   SpO2 96%   BMI 22.05 kg/m    BMI- Body mass index is 22.05 kg/m .,     RECOMMENDED WEIGHT GAIN: 15-25 lbs.  PHQ9- Today's Depression Rating was No Value exists for the : HP#PHQ9  GENERAL:  Pleasant pregnant female, alert, well groomed.   SKIN:  Warm and dry, without lesions or rashes  HEAD: Symmetrical features.  NECK:  Thyroid without enlargement and nodules.  Lymph nodes not palpable.   LUNGS:  Clear to auscultation.  HEART:  RRR without murmur.  ABDOMEN: Soft without masses , tenderness or organomegaly.  No CVA tenderness. No scars noted.     MUSCULOSKELETAL:  Full range of motion  EXTREMITIES:  No edema. No significant varicosities.   GENITALIA: deferred.    =========================================  ASSESSMENT:  29w0d  (Z34.83) Encounter for supervision of other normal pregnancy in third trimester  (primary encounter diagnosis)  Plan: Hepatitis B surface antigen, ABO/Rh type and         screen, CBC with platelets, Rubella Antibody         IgG, Urinalysis Macroscopic, Urine Culture,         Glucose tolerance, gest screen, 1 hour, "         Prenatal Vit-Fe Fumarate-FA (PRENATAL         MULTIVITAMIN W/IRON) 27-0.8 MG tablet,         Varicella Zoster Virus Antibody IgG, NEISSERIA         GONORRHOEA PCR, CHLAMYDIA TRACHOMATIS PCR, Mat         [facility]etal [facility] Referral - Pregnancy, CANCELED:         HIV Antigen Antibody Combo, CANCELED: Hepatitis        C antibody, CANCELED: Treponema Abs w Reflex to        RPR and Titer    PREGNANCY AT RISK? no  ==========================================  PLAN:  Instructed on use of triage nurse line and contacting the on call CNM after hours for an urgent need such as fever, vagina bleeding, bladder or vaginal infection, rupture of membranes,  or term labor.    Instructed on best evidence for: weight gain for her BMI for pregnancy; healthy diet and foods to avoid; exercise and activity during pregnancy;avoiding exposure to toxoplasmosis; and maintenance of a generally healthy lifestyle.   Discussed the harms, benefits, side effects and alternative therapies for current prescribed and OTC medications.  Follow up in 2 weeks.     RAPHAEL Taylor CNM

## 2023-06-20 LAB
RUBV IGG SERPL QL IA: 7.06 INDEX
RUBV IGG SERPL QL IA: POSITIVE
VZV IGG SER QL IA: 42.9 INDEX
VZV IGG SER QL IA: NORMAL

## 2023-06-21 ENCOUNTER — TELEPHONE (OUTPATIENT)
Dept: BEHAVIORAL HEALTH | Facility: CLINIC | Age: 23
End: 2023-06-21
Payer: COMMERCIAL

## 2023-06-21 NOTE — TELEPHONE ENCOUNTER
Patient outreach per CNM referral. Spoke with patient about [facility] role and scheduled new patient visit.

## 2023-06-25 ASSESSMENT — PATIENT HEALTH QUESTIONNAIRE - PHQ9: SUM OF ALL RESPONSES TO PHQ QUESTIONS 1-9: 3

## 2023-06-26 ENCOUNTER — VIRTUAL VISIT (OUTPATIENT)
Dept: BEHAVIORAL HEALTH | Facility: CLINIC | Age: 23
End: 2023-06-26
Payer: COMMERCIAL

## 2023-06-26 ENCOUNTER — HOSPITAL ENCOUNTER (OUTPATIENT)
Dept: ULTRASOUND IMAGING | Facility: CLINIC | Age: 23
Discharge: HOME | End: 2023-06-26
Attending: ADVANCED PRACTICE MIDWIFE
Payer: COMMERCIAL

## 2023-06-26 ENCOUNTER — OFFICE VISIT (OUTPATIENT)
Dept: MATERNAL FETAL MEDICINE | Facility: CLINIC | Age: 23
End: 2023-06-26
Attending: ADVANCED PRACTICE MIDWIFE
Payer: COMMERCIAL

## 2023-06-26 DIAGNOSIS — O26.90 PREGNANCY RELATED CONDITION, ANTEPARTUM: ICD-10-CM

## 2023-06-26 DIAGNOSIS — F32.A DEPRESSION, UNSPECIFIED DEPRESSION TYPE: Primary | ICD-10-CM

## 2023-06-26 DIAGNOSIS — O09.33 LIMITED PRENATAL CARE IN THIRD TRIMESTER: Primary | ICD-10-CM

## 2023-06-26 PROCEDURE — 76811 OB US DETAILED SNGL FETUS: CPT | Mod: 26 | Performed by: OBSTETRICS & GYNECOLOGY

## 2023-06-26 PROCEDURE — 76811 OB US DETAILED SNGL FETUS: CPT

## 2023-06-26 PROCEDURE — 90834 PSYTX W PT 45 MINUTES: CPT | Mod: 95

## 2023-06-26 ASSESSMENT — COLUMBIA-SUICIDE SEVERITY RATING SCALE - C-SSRS
TOTAL  NUMBER OF INTERRUPTED ATTEMPTS LIFETIME: 1
MOST LETHAL DATE: 66350
REASONS FOR IDEATION PAST MONTH: COMPLETELY TO END OR STOP THE PAIN (YOU COULDN'T GO ON LIVING WITH THE PAIN OR HOW YOU WERE FEELING)
REASONS FOR IDEATION LIFETIME: EQUALLY TO GET ATTENTION, REVENGE, OR A REACTION FROM OTHERS AND TO END/STOP THE PAIN
6. HAVE YOU EVER DONE ANYTHING, STARTED TO DO ANYTHING, OR PREPARED TO DO ANYTHING TO END YOUR LIFE?: NO
4. HAVE YOU HAD THESE THOUGHTS AND HAD SOME INTENTION OF ACTING ON THEM?: NO
ATTEMPT PAST THREE MONTHS: NO
TOTAL  NUMBER OF INTERRUPTED ATTEMPTS LIFETIME: YES
ATTEMPT LIFETIME: YES
2. HAVE YOU ACTUALLY HAD ANY THOUGHTS OF KILLING YOURSELF?: YES
5. HAVE YOU STARTED TO WORK OUT OR WORKED OUT THE DETAILS OF HOW TO KILL YOURSELF? DO YOU INTEND TO CARRY OUT THIS PLAN?: NO
LETHALITY/MEDICAL DAMAGE CODE FIRST ACTUAL ATTEMPT: NO PHYSICAL DAMAGE OR VERY MINOR PHYSICAL DAMAGE
TOTAL  NUMBER OF ABORTED OR SELF INTERRUPTED ATTEMPTS LIFETIME: NO
5. HAVE YOU STARTED TO WORK OUT OR WORKED OUT THE DETAILS OF HOW TO KILL YOURSELF? DO YOU INTEND TO CARRY OUT THIS PLAN?: NO
TOTAL  NUMBER OF INTERRUPTED ATTEMPTS PAST 3 MONTHS: NO
LETHALITY/MEDICAL DAMAGE CODE MOST RECENT ACTUAL ATTEMPT: NO PHYSICAL DAMAGE OR VERY MINOR PHYSICAL DAMAGE
2. HAVE YOU ACTUALLY HAD ANY THOUGHTS OF KILLING YOURSELF?: YES
1. HAVE YOU WISHED YOU WERE DEAD OR WISHED YOU COULD GO TO SLEEP AND NOT WAKE UP?: YES
TOTAL  NUMBER OF ACTUAL ATTEMPTS LIFETIME: 1
LETHALITY/MEDICAL DAMAGE CODE MOST LETHAL ACTUAL ATTEMPT: NO PHYSICAL DAMAGE OR VERY MINOR PHYSICAL DAMAGE
3. HAVE YOU BEEN THINKING ABOUT HOW YOU MIGHT KILL YOURSELF?: YES
MOST RECENT DATE: 66350
4. HAVE YOU HAD THESE THOUGHTS AND HAD SOME INTENTION OF ACTING ON THEM?: NO
1. IN THE PAST MONTH, HAVE YOU WISHED YOU WERE DEAD OR WISHED YOU COULD GO TO SLEEP AND NOT WAKE UP?: YES
FIRST ATTEMPT DATE: 66350

## 2023-06-26 ASSESSMENT — PATIENT HEALTH QUESTIONNAIRE - PHQ9
SUM OF ALL RESPONSES TO PHQ QUESTIONS 1-9: 3
10. IF YOU CHECKED OFF ANY PROBLEMS, HOW DIFFICULT HAVE THESE PROBLEMS MADE IT FOR YOU TO DO YOUR WORK, TAKE CARE OF THINGS AT HOME, OR GET ALONG WITH OTHER PEOPLE: NOT DIFFICULT AT ALL

## 2023-06-26 NOTE — PROGRESS NOTES
Please see full imaging report from [employer] program under imaging tab.    Tegan Nathan MD  Maternal Fetal Medicine

## 2023-06-26 NOTE — Clinical Note
Here is the patient I mentioned earlier, we both are scheduled with her next Monday. I'm going to give her a check-in call later this week as well.

## 2023-06-26 NOTE — PROGRESS NOTES
"[facility]  June 26, 2023  Behavioral Health Clinician Progress Note    Patient Name: Kevin Urena         Service Type:  Individual      Service Location:   Phone call (patient / identified key support person reached)     Session Start Time: 8:30 AM  Session End Time: 9:20 AM      Session Length: 38 - 52      Attendees: Patient     Service Modality:  Phone Visit:      Provider verified identity through the following two step process.  Patient provided:  Patient is known previously to provider    Telephone Visit: The patient's condition can be safely assessed and treated via synchronous audio telemedicine encounter.      Reason for Audio Telemedicine Visit: Patient has requested telehealth visit and Patient convenience (e.g. access to timely appointments / distance to available provider)    Originating Site (Patient Location): Patient's home    Distant Site (Provider Location): [facility]    Consent:  The patient/guardian has verbally consented to:     1. The potential risks and benefits of telemedicine (telephone visit) versus in person care;    The patient has been notified of the following:      \"We have found that certain health care needs can be provided without the need for a face to face visit.  This service lets us provide the care you need with a phone conversation.       I will have full access to your [facility] medical record during this entire phone call.   I will be taking notes for your medical record.      Since this is like an office visit, we will bill your insurance company for this service.       There are potential benefits and risks of telephone visits (e.g. limits to patient confidentiality) that differ from in-person visits.?Confidentiality still applies for telephone services, and nobody will record the visit.  It is important to be in a quiet, private space that is free of distractions (including cell phone or other devices) " "during the visit.??      If during the course of the call I believe a telephone visit is not appropriate, you will not be charged for this service\"     Consent has been obtained for this service by care team member: Yes     Visit Activities (Refresh list every visit): NEW, [facility] Only and Phone Encounter    Diagnostic Assessment Date: Not yet completed  Treatment Plan Review Date: Not yet created  See Flowsheets for today's PHQ-9 and GUCCI-7 results  Previous PHQ-9:     6/19/2023    10:15 AM 6/25/2023     8:52 AM   PHQ-9 SCORE   PHQ-9 Total Score MyChart  3 (Minimal depression)   PHQ-9 Total Score 4 3     Previous GUCCI-7:       6/19/2023    10:15 AM   GUCCI-7 SCORE   Total Score 4       GENEVIEVE LEVEL:       No data to display                DATA  Extended Session (60+ minutes): No  Interactive Complexity: No  Crisis: No  [facility] Patient: No    Treatment Objective(s) Addressed in This Session:  Target Behavior(s): management of mental health symptoms    Depressed Mood: Decrease frequency and intensity of feeling down, depressed, hopeless  Identify negative self-talk and behaviors: challenge core beliefs, myths, and actions  Decrease thoughts that you'd be better off dead or of suicide / self-harm   Establish appropriate mental health services    Current Stressors / Issues:  Patient was referred by nurse midwife to address symptoms of depression during pregnancy. She is currently pregnant, expecting in September. Patient endorsed depressed mood, low self-worth, sadness, and suicidal ideation. She reported she has struggled with mental health since childhood and that symptoms are currently exacerbated by stress of ongoing court case and potential three month incarceration. Patient stated that the thought of delivering her baby in [facility] and being away from him until release \"makes me want to leave everything behind\" - she described suicidal ideation as currently passive, though she expressed belief she would develop a plan if she were " sentenced to snf (court date is July 10th). Patient expressed interest in mental health services to address mental health concerns and clarify mental health diagnosis (medical record shows bipolar disorder, hx of oppositional defiant disorder, ADHD, ASD, and patient reports depression, anxiety, and trauma history). She is also interested in medication.   [facility] provided empathetic listening, affirmed patient's openness to additional support, validated emotional distress, assessed mental health symptoms and SI, provided information about treatment options, explored readiness for change. Expressed concern regarding severity of symptoms and provided crisis resources (pt declined recommendation to develop safety plan).     Progress on Treatment Objective(s) / Homework:  New Objective established this session - PREPARATION (Decided to change - considering how); Intervened by negotiating a change plan and determining options / strategies for behavior change, identifying triggers, exploring social supports, and working towards setting a date to begin behavior change - treatment plan not defined, objectives will include emotion regulation, decreasing SI, establishing appropriate mental health services    Motivational Interviewing    MI Intervention: Expressed Empathy/Understanding, Supported Autonomy, Collaboration, Evocation, Open-ended questions, Reflections: simple and complex, Change talk (evoked) and Reframe     Change Talk Expressed by the Patient: Desire to change Reasons to change Need to change Taking steps    Provider Response to Change Talk: E - Evoked more info from patient about behavior change, A - Affirmed patient's thoughts, decisions, or attempts at behavior change, R - Reflected patient's change talk and S - Summarized patient's change talk statements    Also provided psychoeducation about behavioral health condition, symptoms, and treatment options    Care Plan review completed: No    Medication Review:  No  current psychiatric medications prescribed    Medication Compliance:  NA    Changes in Health Issues:  Currently pregnant - 30w1d  Pelvic pain    Chemical Use Review:   Substance Use: Chemical use reviewed, no active concerns identified      Tobacco Use: No current tobacco use.      Assessment: Current Emotional / Mental Status (status of significant symptoms):  Risk status (Self / Other harm or suicidal ideation)  Patient has had a history of suicidal ideation: passive and active, beginning in 7th grade, suicide attempts: August 2022 (reports she does not recall having a plan/intent leading to suicidal behavior - trying to jump in front of buses - and was intoxicated at this time, self-injurious behavior: cutting, and thoughts of harming mom/others as a child but never wanted to act on them; and denies a history of homicidal ideation, homicidal behavior and and other safety concerns.   Patient denies current fears or concerns for personal safety.  Patient reports the following current or recent suicidal ideation or behaviors: does not report plan or intent today, reports plan would be more active if she were to be sentenced to [facility] time.  Patient denies current or recent homicidal ideation or behaviors.  Patient denies current or recent self injurious behavior or ideation.  Patient denies other safety concerns.  A safety and risk management plan has not been developed at this time, however patient was encouraged to call [facility] should there be a change in any of these risk factors. Patient declined recommendation to co-develop safety plan but was accepting of crisis resources. Protective factors include pregnancy, openness to additional mental health support/medication, willingness to discuss SI, access to crisis resources.     Appearance:   Unable to assess   Eye Contact:   Unable to assess   Psychomotor Behavior: Unable to assess   Attitude:   Cooperative  Interested  Pleasant  Orientation:   All  Speech   Rate / Production: Normal/ Responsive   Volume:  Normal   Mood:    Anxious  Depressed   Affect:    Appropriate   Thought Content:  Clear, intermittent suicidal ideation (currently denies plan/intent)  Thought Form:  Coherent  Goal Directed  Logical   Insight:    Good     Diagnoses:  1. Depression, unspecified depression type      Collateral Reports Completed:  Not Applicable     Plan: (Homework, other):  Follow-up scheduled and will plan for brief phone check in at end of the week. [facility] assisted with referrals for  psychiatry, intensive outpatient programming. Sent crisis resources and suggestions for coping strategies. CD Recommendations: No indications of CD issues.     SWEETIE Barber, [facility]

## 2023-06-26 NOTE — NURSING NOTE
Patient presents to [facility] for L2US at 30+1 weeks due to inconsistent prenantal care.  Positive fetal movement. Denies LOF, vaginal bleeding or cramping/contractions. SBAR given to M MD, see their note in Epic.

## 2023-06-28 ENCOUNTER — TELEPHONE (OUTPATIENT)
Dept: PSYCHIATRY | Facility: CLINIC | Age: 23
End: 2023-06-28
Payer: COMMERCIAL

## 2023-06-28 NOTE — TELEPHONE ENCOUNTER
PSYCHIATRY CLINIC PHONE INTAKE     SERVICES REQUESTED / INTERESTED IN          Other:  [facility]    Presenting Problem and Brief History                              What would you like to be seen for? (brief description):  Pt was diagnosed over a span of time starting in 2013. Pt isn't taking any medications, but took medication for ADHD (adderall) and seraquel, zoloft. She was stopped taking medications in 2014 or 2015, due to her mother's decision (concerns about the medication). Pt is 30 weeks pregnant. Npo difficulty with appetite or sleep.     Have you received a mental health diagnosis? Yes   Which one (s): Anxiety, Dissociative Disorder, Consult, Bi-polar, Major Depression, ADHD, and ASD  Is there any history of developmental delay?  Yes - Learning Delays  Are you currently seeing a mental health provider?  No            Who / month last seen:  NA  Do you have mental health records elsewhere?  Yes  Will you sign a release so we can obtain them?  Yes    Have you ever been hospitalized for psychiatric reasons?  Yes Describe:  In 2022 overnight for intoxication and sucidal. In 2013 or 2014 she did inpt at [facility]    Do you have current thoughts of self-harm?  Yes . Pt feels like she has a plan or intent when she has these thoughts.   Do you currently have thoughts of harming others?  No    Do you have any safety concerns? No   If yes to these, offer to reach out to a  for follow up.      Substance Use History     Do you have any history of alcohol / illicit drug use?  Yes  Describe:  Alcohol and Mariajuana Use - Sometime uses marijuana  Have you ever received treatment for this?  No    Describe:  NA     Social History     Who is the patient's a guardian?  No    Name / number: NA  Have you had an ACT team in last 12 months?  No  Describe: NA   OK to leave a detailed voicemail?  Yes    Would you be interested in learning more about research opportunities for which you or your child may qualify? We can  connect you with a team member for more information.  Yes  If yes, send an [employer] message to Romy uNnn    Medical/ Surgical History                                   Patient Active Problem List   Diagnosis     Alcohol intoxication (H)     Disruptive Behavior Disorder     Attention deficit hyperactivity disorder (ADHD)     Allergic Rhinitis     Bipolar Disorder NOS     Autistic Disorder          Medications             Have you taken >3 psychiatric medications in your past?  Yes  Do you currently take 5 or more medications, including prescriptions, supplements, and other over the counter products?  Unknown    If YES to at least one of these questions:   As part of your evaluation in our clinic, we have specially trained pharmacists as part of your care team. Your provider would like for you to meet with one of our pharmacists to review your current and past medications, ensure your med list is up to date, and queue up any questions or concerns you have about medications. They will review all of your medications, not just for mental health, to help ensure you know what you re taking and that everything is working together.     Please schedule patient in [facility] PSYCHIATRY (Jenifer Holt or Mindi Armstrong) for 60m MTM in any green space as virtual (video), telephone, or in person (designated in person days per Epic templates).  -Appt notes can say  Psych eval on xx/xx   -Route telephone encounter to the pharmacist who will be seeing the patient.  If patient has questions about insurance coverage or billing, please still schedule the visit and refer them to call the [facility] coordinators at [phone].    Current Outpatient Medications   Medication Sig Dispense Refill     cefdinir (OMNICEF) 300 MG capsule Take 1 capsule (300 mg) by mouth 2 times daily 10 capsule 0     Prenatal Vit-Fe Fumarate-FA (PRENATAL MULTIVITAMIN W/IRON) 27-0.8 MG tablet Take 1 tablet by mouth daily 90 tablet 3     Prenatal Vit-Fe  Fumarate-FA (SM PRENATAL VITAMINS) 28-0.8 MG TABS            DISPOSITION      6/28/23 ntake complete. Scheduled for MTM on 7/11/23 at 8:30am w/ Keysha Armstrong. Scheduled for [facility] DA on 7/20/23 at  9am w/ Lori Romero and 7/27/23 w/  at 8:30am.     Rosa Kaiser Sr., Lead

## 2023-06-30 ENCOUNTER — TELEPHONE (OUTPATIENT)
Dept: BEHAVIORAL HEALTH | Facility: CLINIC | Age: 23
End: 2023-06-30
Payer: COMMERCIAL

## 2023-06-30 NOTE — TELEPHONE ENCOUNTER
Attempted to reach patient for brief safety/symptom assessment as discussed in visit on 6/26. No answer, unable to leave VM. Sent [employer] message.

## 2023-07-03 ENCOUNTER — PRENATAL OFFICE VISIT (OUTPATIENT)
Dept: MIDWIFE SERVICES | Facility: CLINIC | Age: 23
End: 2023-07-03
Payer: COMMERCIAL

## 2023-07-03 ENCOUNTER — VIRTUAL VISIT (OUTPATIENT)
Dept: BEHAVIORAL HEALTH | Facility: CLINIC | Age: 23
End: 2023-07-03
Payer: COMMERCIAL

## 2023-07-03 VITALS
WEIGHT: 121.8 LBS | SYSTOLIC BLOOD PRESSURE: 115 MMHG | DIASTOLIC BLOOD PRESSURE: 67 MMHG | HEART RATE: 92 BPM | BODY MASS INDEX: 23.01 KG/M2 | OXYGEN SATURATION: 98 %

## 2023-07-03 DIAGNOSIS — Z34.83 ENCOUNTER FOR SUPERVISION OF OTHER NORMAL PREGNANCY IN THIRD TRIMESTER: Primary | ICD-10-CM

## 2023-07-03 DIAGNOSIS — O26.899 PELVIC PRESSURE IN PREGNANCY: ICD-10-CM

## 2023-07-03 DIAGNOSIS — R10.2 PELVIC PRESSURE IN PREGNANCY: ICD-10-CM

## 2023-07-03 DIAGNOSIS — F32.A DEPRESSION, UNSPECIFIED DEPRESSION TYPE: Primary | ICD-10-CM

## 2023-07-03 PROCEDURE — 99207 PR PRENATAL VISIT: CPT | Performed by: ADVANCED PRACTICE MIDWIFE

## 2023-07-03 PROCEDURE — 99207 PR NO BILLABLE SERVICE THIS VISIT: CPT

## 2023-07-03 ASSESSMENT — ANXIETY QUESTIONNAIRES
1. FEELING NERVOUS, ANXIOUS, OR ON EDGE: NOT AT ALL
5. BEING SO RESTLESS THAT IT IS HARD TO SIT STILL: NOT AT ALL
3. WORRYING TOO MUCH ABOUT DIFFERENT THINGS: NOT AT ALL
IF YOU CHECKED OFF ANY PROBLEMS ON THIS QUESTIONNAIRE, HOW DIFFICULT HAVE THESE PROBLEMS MADE IT FOR YOU TO DO YOUR WORK, TAKE CARE OF THINGS AT HOME, OR GET ALONG WITH OTHER PEOPLE: SOMEWHAT DIFFICULT
GAD7 TOTAL SCORE: 2
2. NOT BEING ABLE TO STOP OR CONTROL WORRYING: NOT AT ALL
6. BECOMING EASILY ANNOYED OR IRRITABLE: MORE THAN HALF THE DAYS
GAD7 TOTAL SCORE: 2
7. FEELING AFRAID AS IF SOMETHING AWFUL MIGHT HAPPEN: NOT AT ALL

## 2023-07-03 ASSESSMENT — PATIENT HEALTH QUESTIONNAIRE - PHQ9
5. POOR APPETITE OR OVEREATING: NOT AT ALL
SUM OF ALL RESPONSES TO PHQ QUESTIONS 1-9: 5

## 2023-07-03 NOTE — PROGRESS NOTES
31w1d  Here with Rakan at [place] for prenatal visit. Feeling well, no concerns. Baby is active. No regular contractions, LOF or bleeding. She has been noticing increased pelvic pressure. Would like to try maternity support belt. Given today in clinic. Also discussed Pelvic floor PT if persists or worsens. Fundal height today significantly s<d, no fundal height listed on [facility] visit for comparison but there was a recent growth done last week that was WNL at 26%, ROSALBA WNL. Continue to monitor closely and consider repeat growth if persists. Has appointment with Maggy this afternoon, reports doing well. PHQ9 - 5. GAD7 - 2. Reviewed phone numbers, when to call. RTC in 2 weeks. MML

## 2023-07-03 NOTE — PROGRESS NOTES
"Call duration: 10 minutes    Spoke with patient at time of previously scheduled appointment. Patient reported mood is stable today, stated she is coping with stress of upcoming court date by \"ignoring it\". She stated suicidal thoughts have been less frequent since last visit, estimating one instance in the last week. No suicidal thoughts currently. Patient stated she did not have any other concerns to check in about today. Was open to scheduling follow-up appointments on 7/7, as she anticipates stress will be higher as court date gets closer, and again on 7/10 after her hearing. Patient has intake scheduled with [facility] on 7/5.         "

## 2023-07-10 NOTE — PROGRESS NOTES
"Medication Therapy Management (MTM) Encounter    ASSESSMENT:                            Medication Adherence/Access: No issues identified    Depression/Anxiety: Discussed value of therapy and other support, especially with post partum adjustments. Could consider medication as a support if needed in the future.  [facility].      PLAN:                            -No changes today    Follow-up: [facility] on 7/20    SUBJECTIVE/OBJECTIVE:                          Kevin Urena is a 22 year old female called for an initial visit. She was referred to me from psychiatry intake.      Reason for visit: med review.    Allergies/ADRs: Reviewed in chart  Past Medical History: Reviewed in chart  Tobacco: She reports that she has never smoked. She has never used smokeless tobacco.  Alcohol: not currently using    Medication Adherence/Access: no issues reported    Patient is currently 32w2d pregnant--currently taking prenatal vitamin    Depression/anxiety: No current medications. Pt reported that she has been diagnosed with Bipolar Disorder, depression, anxiety in the past. She is not interested in taking any medications while pregnant.   Patient reported that she lives with her boyfriend, mother, two brothers, step daughter and she often feels agitated and overwhelmed with everyone at home. She often feels like she needs to leave the house and will stay away for many hours. She is looking forward to having her child, \"but sometimes just wish things were different.\" She sometimes feels hopeless about the future, noting that \"everything is overwhelming.\"  She is working on getting her GED and feels motivated \"to do more\" for her baby.  She endorsed sometimes feeling that \"I wish I wasn't here,\" but stated she would not act on these thoughts to protect her baby. She is looking forward to the future and \"things changing\" after the baby is born.  She had seen a therapist about " 10 years ago for depression/SI related help. She did talk with a therapist through [facility] last year.    Past Medication Trials--she thinks she may have tried a couple other meds, but only took anything for 1-2 months    Medications Max dose Dates/Duration Discontinuation Reason Other Notes   Sertraline 50mg 6/2022 x 6 weeks Not helpful    quetiapine  2009? 2022?     Adderall  2009?         Today's Vitals: LMP 11/27/2022 (Exact Date)   ----------------    I spent 15 minutes with this patient today. A copy of the visit note was provided to the patient's provider(s).    A summary of these recommendations was declined by the patient.    Mindi Armstrong, PharmD  Medication Therapy Management Pharmacist  [facility]    Telemedicine Visit Details  Type of service:  Telephone visit  Start Time: 8:30am  End Time: 8:45am     Medication Therapy Recommendations  No medication therapy recommendations to display

## 2023-07-11 ENCOUNTER — VIRTUAL VISIT (OUTPATIENT)
Dept: PHARMACY | Facility: CLINIC | Age: 23
End: 2023-07-11
Payer: COMMERCIAL

## 2023-07-11 DIAGNOSIS — F33.0 MILD EPISODE OF RECURRENT MAJOR DEPRESSIVE DISORDER (H): Primary | ICD-10-CM

## 2023-07-11 DIAGNOSIS — F41.1 GAD (GENERALIZED ANXIETY DISORDER): ICD-10-CM

## 2023-07-11 PROCEDURE — 99605 MTMS BY PHARM NP 15 MIN: CPT | Performed by: PHARMACIST

## 2023-07-11 NOTE — Clinical Note
Hello! I saw this patient to review meds prior to [facility] intake next week. See note for details. I think therapy would be very helpful for her. She's not interested in meds right now anyway. Let me know what questions you have! Mindi

## 2023-07-11 NOTE — PATIENT INSTRUCTIONS
"Recommendations from today's MTM visit:                                                    MTM (medication therapy management) is a service provided by a clinical pharmacist designed to help you get the most of out of your medicines.   Today we reviewed what your medicines are for, how to know if they are working, that your medicines are safe and how to make your medicine regimen as easy as possible.      Continue current medications    Follow-up: [facility] on 7/20    It was great speaking with you today.  I value your experience and would be very thankful for your time in providing feedback in our clinic survey. In the next few days, you may receive an email or text message from [employer] with a link to a survey related to your  clinical pharmacist.\"     To schedule another MTM appointment, please call the clinic directly or you may call the MTM scheduling line at [phone] or toll-free at [phone].     My Clinical Pharmacist's contact information:                                                      Please feel free to contact me with any questions or concerns you have.      Mindi Armstrong, PharmD  Medication Therapy Management Pharmacist  [facility]    "

## 2023-07-16 NOTE — PATIENT INSTRUCTIONS
"Urgent care for Adult Mental Health  ([place], [place], [facility])  [email or web address]  [phone]  [facility] Mental Health Crisis Lines:  [facility] [phone]  [facility] [phone]  [facility] [phone]  [facility] [phone]  [facility], Adults [phone]  [facility], Children [phone]  [facility], Adults [phone]  [facility], Children [phone]              \"We hope you had a positive experience and that you can definitely recommend [facility] to your family and friends. You ll be receiving a survey soon and we look forward to hearing your feedback\".    [facility]  - Contact information:  Appointment line and to get a hold of CNM in clinic Monday-Friday 8 am - 5 pm:  [phone].  There are some clinics with early start times (1st appointment 7:40 am) and others with evening hours (last appointment 6:20 pm).  Most are typically open from 8 am to 5 pm.    CNM on call answering service: [phone].  Specify your hospital of choice and leave a brief message for CNM;  will then page CNM who is on call at your specified hospital and you should receive a call back with 15 minutes.  Be sure that your ringer is audible and that you can accept blocked calls so that we can get back in touch with you! This number should be reserved for urgent needs if during the day, before 8 am, after 5 pm, weekends, holidays.    Contact the on-call CNM with warning signs, such as:  vaginal bleeding   Vaginal discharge and itching or pain and burning during urination  Leg/calf pain or swelling on one side  severe abdominal pain  nausea and vomiting more than 4-5 times a day, or if you are unable to keep anything down  fever more than 100.4 degrees F.     MyChart  After each of your visits you are welcome to check [facility] for your visit summary including education and links to " "information relevant to your pregnancy and/or well woman care.   Find the \"Visits\" tab at the top of the page, you will see a list of recent visits and for each visit a for link for \"View After Visit Summary.\" View of your After Visit Summary will allow you to read our recommendations from your visit, review any education provided, and link to websites with useful information.   If you have any questions or difficulty navigating [place], please feel free to contact us and we will do our best to direct you.  Meet the Midwives from [facility]  You are invited to an informational meet and greet with [facility] Certified Nurse-Midwives. Our free \"Meet the Midwives\" event is a great opportunity to learn about our midwives' philosophy and experience, the hospitals where we can assist with your birth, and answer questions you may have. Partners, friends, and family are welcome to attend. Currently, this is a virtual event.  Date  Last Thursday of every month at 7 pm.    Link to next (live) meeting  [email or web address]  To Join by Telephone (audio only) Call:   [phone] Phone Conference ID: [phone] #      Touring the Maternity Care Center  At this time we are offering a virtual tour of the Maternity Care Centers at both [facility] and [facility]:   [facility] Maternity Care:   https://[place] [place].org/locations/the-birthplace-at--[facility] Maternity Care:   https://SodaStream.org/locations/the-birthplace-at[facility]    Scroll to the bottom of the page in this link if the above link does not work.      Breastfeeding and Birth Control  How do I decide what birth control method is best for me while I am breastfeeding?  Choosing a method of birth control is very personal. First, answer the following questions:     Do you want to have more children?   How much " spacing between births do you want for your children?   Do you smoke or have you had any health problems, such as liver disease or a blood clot?  Talk about the answers to each of these questions with your health care provider to help you choose the best method for you.    Can I use breastfeeding as my birth control?  Using breastfeeding as your birth control (the lactational amenorrhea method) can be a good way to keep from getting pregnant in the first months after the baby is born. Each time your baby nurses, your body releases a hormone called prolactin, which stops your body from making the hormones that cause you to ovulate (release an egg). If you are not ovulating, you cannot get pregnant.    The lactational amenorrhea method works only if:   you have not started your period yet.   you are breastfeeding only and not giving your baby any other food or drink.   you are breastfeeding at least every 4 hours during the day and every 6 hours at night.   your baby is less than 6 months old.  When any 1 of these 4 things is not happening, you no longer have good protection from getting pregnant, and you should use another form of birth control.    What birth control methods are safe for me to use while I breastfeed?    Methods without hormones  Methods without hormones do not affect you, your baby, or your breastfeeding.  Methods without hormones that are the most effective   The copper intrauterine contraceptive device (IUD) (ParaGard) is a small, T-shaped device that is in- serted into your uterus (womb) through the vagina and cervix. The copper IUD lasts for 10 years.   Sterilization (getting your tubes tied or your partner having a vasectomy) is very effective, but it is per- manent. You should choose sterilization only if you do not want to have more children.  A method without hormones that is effective   The lactational amenorrhea method described above is effective for the first 6 months.  Methods without  hormones that are less effective   Natural family planning is monitoring your body for signs of ovulation and not having sex when you think you are ovulating. This method is reliable only if you are having regular periods every month.   Barrier methods (condoms, diaphragms, sponges, and spermicides) are used at the time you have sex. These methods are effective only if you use them correctly every time.    Methods with hormones  Birth control methods that use hormones can be used while you are breastfeeding. They may have a small effect on lowering the amount of milk you make. All hormones will get into your breast milk in very small amounts, but there is no known harm to your baby from this small amount of hormone in breast milk.only methodsmethods use only 1 hormone, called progestin. You can start them right after your baby is born or wait 4 to 6 weeks to make sure your milk supply is good.   Progestin-only pills ( minipills ): If you like to take pills every day, you can use the minipill. In order forpill to work well, you have to take 1 at the same time each day. When you stop breastfeeding, you should start pills that have both estrogen and progestin because they are better at keeping you from get- ting pregnant.   Progestin IUD (Mirena): The progestin IUD is shaped and inserted into the uterus like the copper IUD. It works for up to 5 years. Both IUDs are usually inserted 4 to 6 weeks after the baby is born.  Progestin implant (Nexplanon): The progestin implant is a small matchstick-sized flexible fabrice. It is placed into the fatty tissue in the back of your arm. It works for up to 3 years.  Progestin shot (Depo-Provera): The progestin shot is given every 3 months.    estrogen and progestin methods  These methods use 2 hormones, called estrogen and progestin.     These methods increase your risk of a blood clot, which is already higher than normal after you have a baby. You should not use them until your baby  is at least 6 weeks old. The combined methods are not recommended as the first choice for women who are breastfeeding. If a combined method is the one that you feel will be best for you to prevent getting pregnant, these methods are okay to use while breastfeeding.   Combined birth control pills: You take a pill each day.  Vaginal ring (NuvaRing): The ring is worn in the vagina for 3 weeks then left out for 1 week before youin a new ring.  Patch (Ortho Evra): The patch is placed on your skin and changed every week for 3 weeks then left off forweek before putting a new patch on a different area of your skin.    Pediatric Care Providers at [facility]:    Choosing the right provider is one of the most important decisions you ll make about your health care. We can help you find the right one. Remember, you re looking for a provider you can trust and work with to improve your health and well-being, so take time to think about what you need. Depending on how complicated your health care needs are, you may need to see more than one type of provider.    Primary Care Providers: You ll see a primary care provider first for most health issues. They ll work with you to get your recommended screenings, help you manage chronic conditions, and refer you to other types of providers if you need them. Your primary care provider may be called a family physician or doctor, internist, general practitioner, nurse practitioner, or physician s assistant. Your child or teenager s provider may be called a pediatrician.  Specialists: You ll see a specialist for certain services or to treat specific conditions. Specialists include cardiologists, oncologists, psychologists, allergists, podiatrists, and orthopedists. You may need a referral from your primary care provider before you go to a specialist in order for your health plan to pay for your visit.\Rickere are some tips for finding a provider where you live:  If you already have a  provider you like and want to keep working with, call their office and ask if they accept your coverage.  Call your insurance company or state Medicaid and CHIP program. Look at their website or check your member handbook to find providers in your network who take your health coverage.  Ask your friends or family if they have providers they like and use these tools to compare health care providers in your area.    Family Medicine at  [facility]:     https://www.[place].org/specialties/family-medicine    Many of our families enjoy all seeing the same doctor, who comes to know the whole family very well. We base our practice on the knowledge of the patient in the context of family and community.  WHY CHOOSE A FAMILY MEDICINE PHYSICIAN?  Ability of the whole family to see the same doctor  Focus on the whole person, including physical and emotional health  A personal relationship with their doctor that is nurtured over time  Respect for individual and family beliefs and values  No need to change primary care providers when a certain age is reached  Coordination of care when other health care services are needed    Pediatrics at  [facility]:   https://www.[place].org/specialties/pediatric-care    Through a teaching affiliation with the [facility], [facility] staff keeps current on new developments in the field of pediatrics.     Everything we do centers around caring for children. We place special emphasis on wellness and prevention.pediatric care team includes a team of pediatricians and certified nurse practitioners who provide care to pediatric and adolescent patients ages 0 to 18, and some up to the age of 26. We offer preventive health maintenance for healthy children as well the diagnosis and treatment of common and chronic illnesses and injuries. In addition, we also offer several pediatric specialists who focus on adolescent health issues and developmental and behavioral  issues.    Circumcision    Educational video:     https://www.International Isotopes.eShakti.com/#[email or web address]    For More Information  American Academy of Family Physicians: Circumcision  http://familydoctor.org/familydoctor/en/pregnancy-newborns/caring-for-newborns/infant- care/circumcision.html  MedlinePlus: Circumcision (includes a slide show on the procedure)  www.nlm.nih.gov/medlineplus/circumcision.html  American Academy of Pediatrics: Policy statement on circumcision  Http://pediatrics.aappublications.org/content/130/3/e756.abstract      Childbirth and Parenting Education:     Everyday Miracles:   https://www.everydayDeath by Partymiracles.org/    Free Video Series from [facility]: https://nursing.[facility]/academics/specialty-areas/nurse-midwifery/having-baby-prenatal-videos/having-baby-prenatal-and    Childbirth Education virtual (live) classes: www.Pogoapp/classes  The Birth Hour: https://Safehouse/online-childbirth-class/  BirthED: https://www.birthedmn.eShakti.com/  [facility] parenting center: http://GokoAscension Borgess Alle[facility] HospitalInnate Pharma/   (807) 379-[phone]  Blooma: (education, yoga & wellness) www.AppLearn.eShakti.com  Enlightened Mama: www.enlightenedmama.eShakti.com   Childbirth collective: (Parent topic nights)  www.childbirthcollective.org/  Hypnobabies:  www.hypnobabiestHstryties.com/  Hypnobirthing:  Http://hypnobirthing.eShakti.com/  Hypnobirthing virtual class: www.Easy Food/hypnobirthing    Information about doulas:  Childbirth collective: http://www.childbirthcollective.org/  Doulas of [place] (YOBANY):  www.yobany.org  [facility]  project: http://Passpacktiesdoulaproject.com/     Early Childhood and Family Education (ECFE):  ECFE offers parents hands-on learning experiences that will nourish a lifetime of teachable moments.  http://ecfe.info/ecfe-home/    APPS and Podcasts:   Joselito Kapoor Nurture    Evidence Based Birth  The Birth Hour (for birth stories)   Birthful   Expectful   The Longest Shortest  "Time  PregnancyPodcast Yasminlydia Grande    Book Recommendations:   Jen Brianna's Birthing From Within--first few chapters include a new-age tone, you may prefer to skip it and keep going, because there is good stuff later.  This book recommendation covers emotional preparation, but does cover coping with pain, and use of both pharmacological and nonpharmacological methods.    Guide to Childbirth by Deneen Grant  Childbirth Without Fear by Judi Aranda Read    Dr. Browning' The Pregnancy Book and The Birth Book--the pregnancy book goes month-by month    The Birth Partner by Marie Cason    Womanly Art of Breastfeeding by La Leche League International   Bestfeeding by Josette Manuel--great pictures    Mothering Your Nursing Toddler, by Rachana Moe.   Addresses dealing with so many of the challenging behaviors of a nursing toddler.  How Weaning Happens, by La Leche League.  Discusses weaning at all ages, from medically necessary weaning of an infant, all the way up to age 5 (or older), with why/why not, and strategies.  Very empowering book both for deciding to wean and deciding not to.    American College of Nurse-Midwives (ACNM) http://www.midwife.org/; look at the informational handouts at http://www.midwife.org/Share-With-Women     www.mymidwife.org    Mother to Baby (Medication and Herbal guidance in pregnancy): http://www.mothertobaby.org  Toll-Free Hotline: 593.675.5337  LactMed (Medication use while breastfeeding): http://toxnet.nlm.nih.gov/newtoxnet/lactmed.htm    Women's Health.gov:  http://www.womenshealth.gov/a-z-topics/index.html    American pregnancy association - http://americanpregnancy.org    Centering Pregnancy (group prenatal care option): http://centeringhealthcare.org    March of Dimes www.Urgent Group.Frugoton - Nutrition  www.mypyramid.gov  Under \"For Consumers,\" click on \"pregnant and breastfeeding women.\"      Centers for Disease Control and Prevention (CDC) - Vaccines : " "http://www.cdc.gov/vaccines/       When researching information on the web, question the validity of websites.  The Sailogy .gov, .edu and.org tend to be more reliable information.  If there are a lot of advertisements, be cautious of the information provided. Stay away from blogs and chat rooms please!     Virtual Breastfeeding Support:    During this time of isolation, breastfeeding families need even more community!  Here are some area organizations offering virtual support groups for breastfeeding:    [employer] Support Group,  at 10:30 am   Run by Dagmar Avalos, IBSARAH of [employer] [facility] [employer] [facility]   Go to [employer] [facility] [employer] Facebook page and click on \"events\" for link   https://www.Kaos Solutions.com/events/144508406088435/  [facility] Milk Hour,  at 2:30 pm    Run by MEG Muro   Go to [facility] FB page and send message to get link   https://www.Kaos Solutions.Bayer AG/healthfoundations/  [facility]/[place] holding virtual meetings the first Tuesday of each month, 8-9 pm, and the   Third Saturday, 10 - 11 am.  Go to [facility] and [place] FB page; message to get link https://www.Kaos Solutions.Bayer AG/LLLofGoldenValdestinee/?hc_location=[facility]  Jseus offers a Lactation Lounge every Friday 12pm - 1pm, run by Shy AlvesFormerly [facility] Leader   Sign up via link at https://www.barter.li/cbe-lactation  [facility] [www.[facility].com] is offering virtual support groups every Monday, 10:30 am - 12 pm, run by nurse IBCLC   Https://www.facebook.com/events/706790669226189/    Prenatal Breastfeeding Classes:      Jesus is offering virtual breastfeeding and  care classes:  https://www.barter.li/education-workshops  BirthEd childbirth and breastfeeding education offering virtual prenatal breastfeeding classes  https://www.Contego Fraud Solutionsedmn.com/workshops    Breastfeeding clinic visits " are at [facility] on , [facility] on  and [facility] on . Call to schedule, [phone].    New Parent Connection:   Jyoti Arredondo, [address] [facility]  In-person meetings on  from 6 pm - 7:15 pm for parents of  to 9 months, at the same site.   All are free, drop-in, no registration required.    There are also free virtual meetings ongoing on :  11:30 am - 12:30 pm for parents of newborns to 3 months  4:15 pm to 5:15 pm for parents of 3 to 9-month olds  For joining info parents should call Cata Ortega at [phone]

## 2023-07-16 NOTE — PROGRESS NOTES
TRANSFER OF CARE VISIT IN PREGNANCY    Assessment / Impression   22 year old  at 33w1d  here for a KATHERINE visit  EDPS= 11, 2 for #10   S<D today with reactive NST  Pregnancy complicated by: anxiety, depression, bipolar, low weight gain, S<D, late onset of prenatal care    Plan:   -Oriented to [facility] CNMs including answering service, hospitals for birth and call rotation. Reviewed prenatal care schedule. New patient packet given and reviewed with patient. CNM services and hospital options reviewed; emergency and scheduling phone numbers given to patient.   -Patient is interested in waterbirth.  WB education given for review.   -Optimal nutrition and weight gain discussed. Pregnancy weight gain for remainder of pregnancy reviewed. Total pregnancy weight gain based on pre-pregnancy BMI is 25-35 lbs (BMI 18.5-24.9).   -Discussed size less than dates and low pregnancy weight gain noted thus far.  Recommend growth ultrasound, patient accepts.  Aware someone from [facility] will call her to schedule.  -Danger s/sx for third trimester reviewed with patient.  labor precautions and danger signs and symptoms reviewed.  All questions answered.  Encouraged daily fetal movement counting and to call or return to clinic with any questions, concerns, or as needed.  -Handouts given regarding GBS, breast-feeding and postpartum depression/anxiety and wellbeing.    -Plan GBS and hgb next visit. Also discussed cervical exam or bedside limited ultrasound to confirm fetal presentation.   -Every day miracles referral submitted for car seat and breast pump.  Informed someone will call her from the organization with additional information.  -Strongly urged her to keep scheduled therapy and psychiatry appointments to ensure she is appropriately managing her mental health.  She does contract for safety today and agrees to call the on-call CNM or a crisis number, all of which were provided through her printed AVS, if she  is feeling actively suicidal.  -Return to clinic in 2 weeks or sooner as needed.    Total time: 50 minutes spent on the date of the encounter doing chart review, review of test results, patient visit and documentation.     RAPHAEL Ruffin, CNTORIE, IBCLC  [facility]  Midwifery    Subjective:   Kevin Urena is a 22 year old  here today for her here today for her transfer of care visit at 33w1d. She presents with her partner Rakan.  She is reporting normal fetal movements.Denies regular painful contractions, bleeding, leaking, changes in fetal movement.     Transfer of Care:    [facility] from: [facility]    Reason for transfer: Had her initial visit with the [facility] CNM's and felt most comfortable with this group so she is returning here for the remainder of her pregnancy and for her birth.    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  Social History     Tobacco Use     Smoking status: Never     Smokeless tobacco: Never   Substance Use Topics     Alcohol use: Not Currently     Drug use: Not Currently     Current Outpatient Medications   Medication Sig Dispense Refill     Prenatal Vit-Fe Fumarate-FA (PRENATAL MULTIVITAMIN W/IRON) 27-0.8 MG tablet Take 1 tablet by mouth daily 90 tablet 3  "    Allergies   Allergen Reactions     Azithromycin Unknown     Doxycycline Hives     Amoxicillin Hives, Rash and Itching     Zithromax [Azithromycin] Hives     States no issues with airway, breathing or swallowing concerns ever in the past     EPDS score today: 11, answers 2 for #10, reports she \"sometimes\" has thoughts of self harm or suicide. She is engaged with a therapist, Maggy, through [employer].  She admits this has been helpful and she is scheduled to see psychiatry later this week.  She states she does not plan to harm herself or end her life while she is pregnant as she does not want to harm her baby.  Does feel committed to establishing her mental health prior to giving birth so that she can be as present as possible for her son.  Feels good support from Carmen and her family.    Objective:     Vitals:    07/17/23 1318   BP: 108/62   Pulse: 78   Weight: 54.4 kg (120 lb)       Physical Exam:    See prenatal flowsheets    NST: Reactive, baseline 140, moderate variability, multiple 15 x 15 accelerations present, no decelerations present.    Uterine activity: None.  "

## 2023-07-17 ENCOUNTER — PRENATAL OFFICE VISIT (OUTPATIENT)
Dept: MIDWIFE SERVICES | Facility: CLINIC | Age: 23
End: 2023-07-17
Payer: COMMERCIAL

## 2023-07-17 VITALS
WEIGHT: 120 LBS | BODY MASS INDEX: 22.67 KG/M2 | HEART RATE: 78 BPM | DIASTOLIC BLOOD PRESSURE: 62 MMHG | SYSTOLIC BLOOD PRESSURE: 108 MMHG

## 2023-07-17 DIAGNOSIS — Z34.83 ENCOUNTER FOR SUPERVISION OF OTHER NORMAL PREGNANCY IN THIRD TRIMESTER: Primary | ICD-10-CM

## 2023-07-17 DIAGNOSIS — O09.93 PREGNANCY, SUPERVISION, HIGH-RISK, THIRD TRIMESTER: ICD-10-CM

## 2023-07-17 DIAGNOSIS — F31.9 BIPOLAR AFFECTIVE DISORDER, REMISSION STATUS UNSPECIFIED (H): ICD-10-CM

## 2023-07-17 DIAGNOSIS — F41.9 ANXIETY: ICD-10-CM

## 2023-07-17 DIAGNOSIS — O26.843 UTERINE SIZE-DATE DISCREPANCY IN THIRD TRIMESTER: ICD-10-CM

## 2023-07-17 DIAGNOSIS — O26.13 LOW WEIGHT GAIN DURING PREGNANCY IN THIRD TRIMESTER: ICD-10-CM

## 2023-07-17 DIAGNOSIS — F33.9 EPISODE OF RECURRENT MAJOR DEPRESSIVE DISORDER, UNSPECIFIED DEPRESSION EPISODE SEVERITY (H): ICD-10-CM

## 2023-07-17 PROBLEM — F10.929 ALCOHOL INTOXICATION (H): Status: RESOLVED | Noted: 2021-11-22 | Resolved: 2023-07-17

## 2023-07-17 PROBLEM — O26.10 LOW WEIGHT GAIN DURING PREGNANCY: Status: ACTIVE | Noted: 2023-07-17

## 2023-07-17 PROBLEM — F32.9 MAJOR DEPRESSION: Status: ACTIVE | Noted: 2023-07-17

## 2023-07-17 PROBLEM — J30.9 ALLERGIC RHINITIS: Status: ACTIVE | Noted: 2022-09-13

## 2023-07-17 PROBLEM — F90.9 ATTENTION DEFICIT HYPERACTIVITY DISORDER (ADHD): Status: ACTIVE | Noted: 2022-09-13

## 2023-07-17 PROBLEM — O26.849 UTERINE SIZE DATE DISCREPANCY: Status: ACTIVE | Noted: 2023-07-17

## 2023-07-17 PROBLEM — Z59.9: Status: RESOLVED | Noted: 2022-09-13 | Resolved: 2023-07-17

## 2023-07-17 PROBLEM — J30.9 ALLERGIC RHINITIS: Status: RESOLVED | Noted: 2022-09-13 | Resolved: 2023-07-17

## 2023-07-17 PROBLEM — Z59.9: Status: ACTIVE | Noted: 2022-09-13

## 2023-07-17 PROBLEM — F84.0 AUTISTIC DISORDER: Status: ACTIVE | Noted: 2022-09-13

## 2023-07-17 PROCEDURE — 99207 PR COMPLICATED OB VISIT: CPT | Performed by: ADVANCED PRACTICE MIDWIFE

## 2023-07-17 PROCEDURE — 59025 FETAL NON-STRESS TEST: CPT | Performed by: ADVANCED PRACTICE MIDWIFE

## 2023-07-18 ENCOUNTER — TRANSCRIBE ORDERS (OUTPATIENT)
Dept: MATERNAL FETAL MEDICINE | Facility: HOSPITAL | Age: 23
End: 2023-07-18
Payer: COMMERCIAL

## 2023-07-18 DIAGNOSIS — O26.90 PREGNANCY RELATED CONDITION: Primary | ICD-10-CM

## 2023-07-20 ENCOUNTER — VIRTUAL VISIT (OUTPATIENT)
Dept: PSYCHIATRY | Facility: CLINIC | Age: 23
End: 2023-07-20
Attending: PSYCHOLOGIST
Payer: COMMERCIAL

## 2023-07-20 ENCOUNTER — TELEPHONE (OUTPATIENT)
Dept: PSYCHIATRY | Facility: CLINIC | Age: 23
End: 2023-07-20
Payer: COMMERCIAL

## 2023-07-20 DIAGNOSIS — F33.2 SEVERE EPISODE OF RECURRENT MAJOR DEPRESSIVE DISORDER, WITHOUT PSYCHOTIC FEATURES (H): Primary | ICD-10-CM

## 2023-07-20 PROCEDURE — 96130 PSYCL TST EVAL PHYS/QHP 1ST: CPT | Mod: VID | Performed by: PSYCHOLOGIST

## 2023-07-20 PROCEDURE — 96131 PSYCL TST EVAL PHYS/QHP EA: CPT | Mod: VID | Performed by: PSYCHOLOGIST

## 2023-07-20 PROCEDURE — 90791 PSYCH DIAGNOSTIC EVALUATION: CPT | Mod: VID | Performed by: PSYCHOLOGIST

## 2023-07-20 NOTE — TELEPHONE ENCOUNTER
This writer called once more to leave another message after Kevin and I were abruptly disconnected during our intake appointment. I let Kevin know in my third message I imagine something had come up, and would sign out of our virtual appointment but would be available until 11 if she was able to wrap up our appointment. Otherwise, I let her know I have an opening today at 3 pm and would try to connect with her then. I let her know I was honored to have the opportunity to meet with her today and have her share her story with me. I let her know the [facility] Team is eager to support her and I am looking forward to finding a time to wrap up our appointment and discuss recommendations and next steps. I left my direct contact number and encouraged Kevin to call me directly.      Luisa Flores, PhD  Postdoctoral Clinical Psychology Fellow  [facility]  Dept of Psychiatry and Behavioral Sciences  Supervising Psychologist: Lori Romero PsyD,   [phone]

## 2023-07-20 NOTE — TELEPHONE ENCOUNTER
This writer called 2x and left messages after we were abruptly disconnected during our intake appointment. Due to technological difficulties, at the point of disconnection we had been meeting via audio-only. At time of disconnection Kevin was engaged and responsive to writer, and indicated she needed to pause briefly to speak with family member, but suddenly phone was disconnected. No identified safety concerns, but I let Kevin know in my messages I wanted to check in and make sure we connected about a plan to complete our appointment if now is no longer a good time to meet. I left my direct contact number and encouraged Kevin to call me directly.     Luisa Flores, PhD  Postdoctoral Clinical Psychology Fellow  [facility]  Dept of Psychiatry and Behavioral Sciences  Supervising Psychologist: Lori Romero PsyD,   [phone]

## 2023-07-20 NOTE — Clinical Note
Thank you for seeing Kevin tomorrow, Pennie. We scheduled another time to meet twice but Kevin no-showed both times. I believe she plans to attend tomorrow and again is interested in discussing non-pharm options she could consider now, as well as learn about pharm options she could consider after delivery. As we didn't get to meet I wasn't able to plant any seeds about our recommendation for the Mother Baby program, or inquire about her willingness to be connected to [facility] - depending on her needs/preferences I'd be happy to meet with again if she'd like to talk more about therapy options. If she is not open to MB the [facility] group comes to mind, since it meets more frequently/is more intensive, and given our conceptualization of her mood concerns within a DBT framework.   Thanks! Luisa

## 2023-07-20 NOTE — PROGRESS NOTES
[facility]??????????????????????????????????????????????????????????????                                         ???Department of Psychiatry   [phone] (Office)??????????????????????????????????????????????????????????                                        ???[address]   [phone] (Clinic)??????????????????????????????????????????????????????????                                         ???[facility]   [phone] (Fax)?????????????????????????????????????????????????????????????                                         ???[place], [place] ?[address]         [facility]  DIAGNOSTIC EVALUATION AND PSYCHOLOGICAL ASSESSMENT    PATIENT: Kevin Urena     : 2000  Encounter Date: 2023     MR#: [id number]  Evaluator: Luisa Flores, PhD     Supervisor: Lori Romero PsyD, LP     Telemedicine start time: 9:15 am   Telemedicine end time: 10:25 am    80119, 70 minutes, with Kevin    Type of service: Telemedicine Diagnostic Evaluation and Psychological Assessment    Reason for Telemedicine Visit: COVID-19 public health recommendations on in-person sessions   Mode of transmission: Secure real time interactive audio and visual telecommunication system (videoconference via [email or web address] initially, due to technical difficulties we switched to audio-only)   Location of originating and distant sites:   ?               Originating site (patient location): patient home   ?               Distant site (provider site): HIPAA compliant location within provider home/remote setting   Telemedicine Visit: The patient's condition can be safely assessed and treated via synchronous audio and visual telemedicine encounter.     Patient has agreed to receiving services via telemedicine technology.      Psychiatric Diagnostic Evaluation: 1 hour spent with the patient?([id number])   Psychological Testing Evaluation Services: 2 hours for review of records, integration, interpretation and  treatment planning, consulting with technician, feedback and report writing ([id number], [id number])   Test Administration and Scorin min of scoring and interpretation of tests by?other qualified healthcare professional?([id number])     IDENTIFYING DATA: Kevin Urena is a 22-year-old Black female who presented for a standard diagnostic assessment over telehealth as part of the [facility] process [facility]. Kevin is currently 33 weeks pregnant and expecting her first child, a boy she plans to name Cyrus. She was referred by her midwife, RAPHAEL Taylor CNM, of [facility], due to concerns related to symptoms of depression. Kevin reported a history of low mood, irritability, and mood swings. She has previously been diagnosed with Major Depressive Disorder and Generalized Anxiety Disorder, and has been hospitalized for safety concerns. Today Kevin identified her primary concern as wanting to take a proactive approach to preventing postpartum depression.      The following information was obtained through an interview completed by Kevin. Limits of confidentiality and the purpose of the appointment?(diagnostic evaluation) were described at the beginning of the session.    CHIEF COMPLAINT: Symptoms of depression including low mood, irritability, self-doubt, feelings of worthlessness, and suicidal ideation; strengthening coping skills to manage relational stress; strengthening stability in regards to managing housing/financial stress    MENTAL HEALTH HISTORY AND DIAGNOSTIC INTERVIEW: Kevin completed the [employer] Neuropsychiatric Interview to aid in diagnostic assessment of current psychological functioning.    Depressive symptoms: Kevin reported longstanding concerns related to fluctuating mood and baseline chronic depressed mood that have worsened during her current pregnancy. She endorsed the following depressive symptoms: depressed mood, anhedonia/loss of  "interest in things she usually enjoys, thoughts/feelings of worthlessness, passive suicidal ideation.     Kevin identified suicidal ideation as a primary concern. She reported a history of chronic passive suicidal ideation and a previous suicide attempt in August 2022 (see Previous Psychiatric History). Today she endorsed passive suicidal ideation. For Kevin this is frequently experienced through the thoughts, \"what's the point,\" and \"I want to go to sleep and wish I wouldn't wake up\" or thoughts of worthlessness (e.g., \"If I die my mom won't care,\" \"my jessica could find someone better\"). Kevin denied current suicidal plan or active intent. She reported she does not plan to harm or kill herself while pregnant, but worries about feeling suicidal after Cyrus's birth. Kevin was emphatic that she does not want to die by suicide and leave her son without a mother. She reported, \"I think I am having this baby to have a reason to be here,\" and also noted that she frequently wonders, \"will my baby be better off without me?\" Kevin reported she is open to therapy, as well as pharmalogical treatment options following delivery, that can help her reduce depressive symptoms and suicidal thoughts, noting, \"I don't want to feel this way. I want to get better.\"      Kevin reported her depression symptoms cause significant distress and impact her daily functioning. Approximately 1-2x/week she feels so low she is unable to accomplish tasks of daily living, such as showering, dressing, and preparing or eating meals. During these days it is also difficult to care for her jessica's daughter.       Kevin denied the following depressive symptoms: difficulty falling or staying asleep, decreased need for sleep.     Mood swings: Kevin identified a previous diagnosis of Bipolar disorder, however, noted this diagnosis is by her mother's report, stating, \"my mom has always referred to me as Bipolar.\" Kevin " "does not remember formally receiving this diagnosis from a mental health professional as an adolescent, young adult, or adult. Kevin raised the question of whether a provider who worked with her in early childhood may have diagnosed her with Bipolar and shared the diagnosis with her mother.     Kevin endorsed concerns related to mood swings and irritability. For Kevin, this frequently looks like \"mood spirals that are hard to describe\" that almost always onset in the context of relational stress or interpersonal conflict and involve more intense low mood, crying, or anger that lasts longer than usual, and it takes longer for Kevin to return to baseline. Kevin feels a significant trigger of her mood swings are \"doubts about myself - am I making a good choice, maybe everyone else is right,\" and these thoughts can lead to thoughts of worthlessness or suicidal thoughts that feel overwhelming and frightening. Kevin noted when these mood swings or mood spirals occur she feels \"intensely depressed\" for anywhere from 30 minutes, to a few hours, to half a day. During that period of time, Kevin often serene with intense emotions by \"walking away from everyone so I can have time and space to myself.\" Other coping strategies include calling her cousin or a friend for support, and taking a nap. Kevin remarked that \"I've noticed that crying helps.\" For example, Kevin reported earlier this week that a mood swing occurred when her mother minimized Kevin's effort to cook for the family and called her \"lazy\", which prompted Kevin's fiance to laugh. Kevin left the house \"crying and fuming\" and spent half a day driving around. She called a cousin for support.      Kevin denied any history or current concerns related to hypomania or chong carlos symptoms, including:  elevated or euphoric mood, goal-directed behaviors, grandiosity, racing thoughts, pressured speech, periods of significant " "insomnia/going without sleep, decreased need for sleep, or impulsive/risk-taking behaviors associated with mood concerns.      Worries: Kevin endorsed concerns related to anxiety symptoms, with two primary concerns identified as: 1) spontaneous racing heart that \"just happens\" independent to any identified trigger or worry thought, and 2) anxiety that her depression will harm her baby. Specifically, Kevin reported experiencing the worries, \"will my baby be depressed because I'm feeling so sad\" and \"am I doing enough to manage my emotions for Cyrus\" daily in association with mood swings/intense feelings of sadness. Kevin has experienced this worry since the start of her pregnancy. She reported this worry was not constant but experienced during peak of depresesd mood, and was difficult to dismiss, and caused her distress. Kevin noted anxiety impacted her functioning at times, noting, \"it sometimes makes it hard to concentrate on things like cooking.\" When asked if anxiety concerns had changed since the start of pregnancy she reported, \"I'm not sure if [racing heart and worry concerns] have gotten worse, better, or stayed the same since pregnancy.\"     Kevin denied generalized anxiety symptoms including: other worries/generalized worries (e.g., she denied generalized worries related to her health, health of family members, or health of her baby, worries about scenarios that haven't happened yet, or worries about the future), difficulty relaxing or initiating sleep, feeling stressed or tense most of the time, feeling restless or \"keyed up\", indecisiveness, body aches, feeling a sense of doom.     As noted, Kevin endorsed concerns related to racing heart. She denied concerns related to panic attacks or symptoms of panic including: shortness of breath, chest tightness, dizziness, numbness, feeling hot/cold, nausea, or the feeling she is \"going crazy\" or going to die.      Substance use: Kevin " "denied current concerns related to substance use. She reported a history of alcohol and substance use (ecstacy) concerns, primarily experienced during periods of homelessness (see Previous Psychiatric History).     Trauma history: Kevin endorsed a significant history of trauma. We were unable to discuss any concerns or symptoms prior to unexpected early end of our appointment. Kevin indicated in a follow-up telephone call that she was open to/interested in discussing trauma symptoms/concerns and \"this would take a long time - I'd need more than 30 minutes.\" A follow-up appointment was made for 7/21/23 to allow time and space to discuss Kevin's history and concerns. Unfortunately Kevin was not able to attend the additional appointment that was scheduled, and offering further assessment of Kevin's trauma history and concerns is recommend for a future appointment.     Kevin denied any current concerns or symptoms related to: Social Anxiety Disorder, Panic Disorder, Eating Disorders, Obsessive Compulsive Disorder, or Psychotic Disorders.     PREVIOUS PSYCHIATRIC HISTORY:  Kevin reported a longstanding history of depressive symptoms going back to childhood. She endorsed first experiencing symptoms of depressed mood and chronic suicidal ideation in 4th grade. Kevin believes that around that time she was referred to therapy and first diagnosed with depression. She noted experiencing a distrust of therapists and therapy after disclosing suicidal ideation to her first therapist. The therapist shared Kevin's report with her mother, who responded by telling Kevin, \"just [kill yourself] so I can get over it.\" Kevin noted, \"my mom warned me, 'don't ever say anything like that ever again', and so I didn't. I never talked to that therapist again, I felt like I couldn't trust her.\"     Kevin reported she next received psychiatric treatment in 7th grade when she engaged in self-harm " "(cutting). She stated, \"I wasn't trying to kill myself, I wanted to hurt myself.\" She believes she was referred to [facility] via an ED visit, and completed a two week intensive program at [facility]. Kevin denied any current concerns or any other history of self-harm.     In August 2022 Kevin was hospitalized following a suicide attempt. She noted she had been kicked out of her mother's home around June 2022 and engaged in significant alcohol abuse during July and August of 2022 while living in shelters and coping with homelessness. On the day Kevin attempted to kill herself she had gone to the [facility] and had been engaged in drinking and later smoking what she believed was marijuana with a group of teens she met. When the teens got up to leave, Kevin reported \"suddenly I started feeling so sad. I was walking around, crying, feeling the lowest I've ever felt. Somehow my brain kicked into the most depressive state I've ever known.\" Kevin decided to kill herself by stepping in front of a bus. Per review of medical records, she was taken by ambulance to [facility] ED after threatening to kill herself and jumping in front of several buses. She reported to EMS she had taken ecstacy, and later reported to ED staff she had wanted to but did not use ecstacy. Upon admittance speech was noted to be rapid and pressured, with labile mood. Medical records show Kevin was discharged the next morning without completing a mental health evaluation (\"Patient sobered overnight. Has no SI this morning. Will have  see patient, anticipate discharge home. Pt signed out to morning provider\"). Kevin believes she received a referral for therapy but was not interested in initiating treatment at the time.     As noted, Kevin endorsed a history of alcohol use concerns she described as occurring primarily in the context of homelessness. In November 2021 she was taken by ambulance from a group home to the " "ED when found unresponsive in context of alcohol intoxication. She denied current alcohol use/abuse concerns.     Of note, some entries in Kevin's medical chart reference a previous diagnosis of Autism. Today Kevin reported the provenance of this diagnosis is by her mother's report, stating, \"my mom thinks I have Autism. She said when I was young a doctor suggested an Autism diagnosis but my mom didn't want to take me.\"  Kevin has never been formally assessed/diagnosed with Autism. She endorsed intermittent concerns related to alexithymia (difficulty describing one's own emotions and internal states). Kevin denied difficulty initiating social interactions, lack of interest in social interactions, narrow/specialized interests, rigidity/difficulty adjusting to changes in routines, restricted/repetitive behaviors or routines, or sensory sensitivity or sensory seeking.     Kevin denied any other previous psychiatric history.    PREVIOUS PSYCHIATRIC INTERVENTIONS:   Kevin endorsed the following previous medication trials (see Medication Therapy Management consultation with Mindi Armstrong, PharmD, dated 7/11/23)  -Sertraline (50 mg?) taken in June 2022 for approximately 6 weeks; discontinued as she did not find beneficial   -Quetiapine (unknown dose) possibly taken in 2009 and 2022  -Adderall (unknown dose) possibly taken in 2009    Review of medical records suggest Kevin was followed by a psychiatrist at [place] in 2013; she does not recall trialing any medications at that time.     No other history of psychiatric medications was reported.     DEVELOPMENTAL HISTORY:   Kevin reported a history of academic difficulty, noting she transitioned from traditional high school setting to job [employer] program after her grades went from A's to F's. She noted her grades began to decline in the context of making new friendships with peers. Review of medical records show Kevin's mother reported concerns " "to their pediatrician related to Kevin \"getting into fights, frequently falling asleep in school, or sneaking out of class and walking around the halls.\"  Kevin does not remember being assessed for learning difficulties or receiving any academic support or services (IEP, 504 plan).      PREGNANCY AND REPRODUCTIVE HEALTH HISTORY:  Kevin reported a history of two pregnancies, including one previous termination and her current pregnancy. She described this pregnancy as unplanned but welcomed. She reported she enjoys caring for and parenting her step-daughter, and is looking forward to becoming a mother to her son Cyrus. Kevin is currently planning for a water birth.     As previously noted, Kevin is looking forward to beginning a GED program on 7/31, which will involve attending in-person classes 4-5 days a week. She plans to take a break from the program when her son is born (SOURAV 9/3/23).       Per medical records and Kevin's report, she began menstruating at approximately age 12 years old. Her menstrual cycle has always been irregular and she gets her period around every 7 weeks. Her period is heavy, lasts for approximately 5 days, and she experiences intense and painful cramping. Kevin denied a history of mood concerns associated with changes in her menstrual cycle.     MEDICAL HISTORY:   For most accurate medical history, please see patient's chart.     Review of medical records show Kevin has a history of asthma, but has not needed to use an inhaler or a nebulizer since childhood.     Kevin is allergic to:   Allergen Reactions     Azithromycin Unknown     Doxycycline Hives     Amoxicillin Hives, Rash and Itching     Zithromax [Azithromycin] Hives       States no issues with airway, breathing or swallowing concerns ever in the past     No other significant previous medical history was reported.     CURRENT MEDICATIONS:  Kevin denied any current use of prescribed or over the " "counter medications.     CURRENT SUBSTANCE USE:  Kevin denied denied current use of alcohol, tobacco, or drugs.     SOCIAL HISTORY:   Kevin was born and raised in [place], [place]. She reported her parents  when she was an infant. She has never lived with her father and reported \"we have no relationship, really.\" Kevin has a younger half-sister and two younger half-brothers. She reported a difficult relationship with her mother and siblings.      Currently, Kevin lives in [place] with her fiance (Rakan), his 10-year-old daughter, and Kevin's mother and two younger brothers. Kevin completed some high school and is enrolled to begin a GED program on 7/31/23. She is currently a full-time parent to her step-daughter. Kevin's fiance, Rakan, does mir work. The couple met in [place] approximately 3 years ago and began dating a year ago before moving to [place] to live with Kevin's mother. Kevin described her relationship with Rakan as close and loving, and noted he has been \"very supportive\" throughout the pregnancy.      Kevin raised concerns about housing instability. The couple is currently living with her mother who completes doggyloot jobs. Kevin noted that since age 16 she has been kicked out of her mother's home approximately twice a year, primarily in the context of interpersonal/relational conflict. During each of those periods she has lived in shelters or been homeless, and noted she has struggled with alcohol abuse in this context. Kevin wishes to avoid housing instability in the future with a new baby. Her hope is to find affordable housing for her, Rakan, his daughter, and their son.       Kevin endorsed experiencing significant stress in the context of managing and responding to ongoing legal proceedings over the past 1.5 years related to an accusation of vandalism/burglary. Kevin reported although her ex is responsible for these events, " "she feels she has been identified as the court's scapegoat because he hasn't shown up to any court proceedings. Kevin's next court date will involve a pretrial in November. Kevin is working with a free  who was assigned to her by the [facility]. She reported feeling satisfied/well represented by her current .      FAMILY PSYCHIATRIC HISTORY:  Kevin denied any formally diagnosed or treated psychiatric conditions in her immediate or extended family history. She reported suspected mood concerns in her immediate family (mother).      SOURCES OF STRENGTH AND PROTECTIVE FACTORS:  Kevin reported use of several coping strategies to manage and reduce intense low mood or mood swings, including taking time to be alone, walk around, or take a nap, calling her cousin or a friend for support, and expressing her emotions by crying.      PSYCHOLOGICAL TESTS ADMINISTERED:    M[employer]I.N.[employer]. International Neuropsychiatric Interview     INTERPRETATION OF PSYCHOLOGICAL TESTS:   Results from the M.I.N.I. and clinical interview show Kevin meets criteria for a provisional diagnosis of Major Depressive Disorder, recurrent, severe. Kevin endorsed symptoms of anxiety but currently does not meet criteria for Generalized Anxiety Disorder. She reported a history of trauma, but due to unexpected early end to our appointment we were not able to discuss her history or any current concerns, or assess for post-traumatic stress disorder symptoms. Further assessment and consideration of trauma history/symptoms is warranted.    MENTAL STATUS EXAM:  Kevin presented as casually dressed and appropriately groomed during the majority of our interview. She appeared her stated age. Eye contact was appropriate. Observed affect was calm. Attitude was open and collaborative. Kevin exhibited a restricted range of affect. Reported current mood was \"sad, depressed\". Kevin was oriented to person, place, and time. She was " "cooperative, engaged, and responsive, and answered the questions asked by the examiner. Attention and concentration were in the normal range. Her speech was normal in tone, rate and volume. Her thought process was linear, logical, and goal-oriented. Insight and judgment were intact. She endorsed passive suicidal ideation and denied current suicidal intent or active plan to harm or kill herself. She demonstrated insight into her symptoms and seemed motivated for treatment.     At 10:25 Kevin indicated she was needed by a family member, and asked to pause our conversation. She seemed to be discussing something with a family member when she was abruptly disconnected from our call. This writter attempted to reconnect with Kevin over the phone at 10:25, 10:33, and 10:42. Kevin sent a message to this writer's work phone at 10:48 stating, \"I'm sorry, I can't finish the meeting right now.\" Kevin and this writer connected and planned to meet again at 3 pm that day, but Vanessaangela did not attend the appointment or respond to calls. Kevin and this writer spoke the next day (7/21) and planned to meet at 3 pm, but Michael did not attend the appointment or respond to phone calls at that time.     DIAGNOSTIC IMPRESSION:     Major depressive disorder, recurrent, severe, without psychotic features, with anxious distress (F33.2)    Further assess trauma history and any concerns related to Post-traumatic stress disorder symptoms  Monitor and target worries related to depressive symptoms     PLAN AND RECOMMENDATIONS:    The results of this evaluation suggest that Kevin is likely to benefit from targeted interventions to proactively manage and cope with symptoms of depressed mood.      Currently, Kevin is motivated to initiate psychotherapy to target symptoms of depression, including depressed mood, thoughts of worthlessness, and chronic suicidal ideation. Given her current concerns, symptom severity, and " "history and we believe Kevin would benefit from a higher level of care and we recommend she participate in the Mother Baby intensive treatment program.     Kevin can contact the Mother Baby team at: [phone]     For more information about Mother Baby treatment program visit:  [email or web address]    Given Kevin's history of depressed mood she is at risk for increased concerns during the postpartum period. It is important that Kevin continue to be followed closely, in order to provide support and intervention related to possible increases in depressive symptoms. Today I shared with Kevin I am particularly concerned by her history of chronic suicidal ideation and endorsement of recent, frequent passive suicidal ideation. We discussed the importance of conceptualizing suicidal ideation as a \"red engine light\" that cannot be ignored, and reviewed the need to treat any experiences of active suicidal intent or plan as a psychiatric emergency. Kevin was open and enthusiastic about initiating treatment to help target these concerns, help her feel better, and support her ability to care for herself and her family.      Today we discussed the option to complete a psychiatric medication consultation with the [facility] reproductive psychiatry team, in order to discuss pharmacological and non-pharmacological treatment options to help target symptoms of depression. Kevin noted while she is not open to initiating a psychiatric medication during pregnancy, she would like to complete the consultation to discuss non-pharmacological treatment options she could consider during pregnancy, as well as pharmacological options she could consider after delivery. A consultation with Pennie Chavarria MD, was scheduled for 7/27/23.     Kevin raised concerns about housing instability. Here are some options for housing resources: "     Housinglink.org (website to search for affordable housing options, including subsidized housing)     from [facility] website: Parenting Youth Ages 18-24 can connect with [employer] ([facility]) by calling [phone] or [phone]. If Kevin provides permission, we can connect her with [facility], of [facility], to provide additional support in identifying, accessing, or applying for housing resources.     Kevin may benefit from several pregnancy and parenting resources that we were unable to discuss today due to unexpected early end to our appointment. We plan to share these resources via follow-up phone call or telehealth appointment:      [employer], a long-term home visiting program to support parents and families: [employer] is a voluntary professional home visiting support program for families with children up to age 5. Kevin is eligible to enroll while pregnant and begin receiving support. Public health nurses and other professionals would visit Kevin in her home or other community place of her choice to:     provide prenatal and postpartum care    help with adjustment to a new baby    assist in getting resources     help families to bond    promote healthy and safe development    help parents prepare kids for school  Kevin can provide permission to be referred by a provider, or refer herself by filling out this online form:   [email or web address]-[email or web address]    [employer] Helpline: Call or text 1-325.306.4682     [place] Maternal Mental Health Hotline: 1-764.398.3410; 24/7, free, confidential hotline for pregnant and new moms providing phone or text access to professional counselors, real-time, culturally-sensitive support, information, and resources    [employer] Peer Support Groups:     Black Moms Connect  "- free virtual peer support group offered every Tuesday at 6:30 CST. \"This is a space for Black mothers to connect, discuss their experiences, and learn helpful tools and resources. Whether you are going through stress, adjustment to parenting, baby blues, pregnancy, or postpartum depression/anxiety, our groups are here for you. You are not alone, and we are here to help.\" To learn more or register visit: https://www.postpartum.net/get-help/psi-online-support-meetings/#black-moms-connect     Mood Support - free virtual peer support group offered Monday-Thursday at varying times. \"Our online groups are here to help you connect with other parents, talk about your experience, and learn about helpful tools and resources. Whether you are going through stress, adjustment to parenting, Baby Blues, or pregnancy or postpartum depression/anxiety, our groups are here for you.\" To learn more or register visit: https://www.postpartum.net/get-help/psi-online-support-meetings/#-mood        If suicidal thoughts return or worsen:   - Call or text 988 or visit the 988 Suicide & Crisis Lifeline  - If any safety concerns, call 911 or present to ED immediately. You are not alone and deserve help and support.     A follow-up appointment was scheduled for 23 at 3 pm to continue to further discuss results and recommendations. As Kevin did not attend this appointment we will reach out to offer another rescheduling option or phone follow-up.      Luisa Flores, PhD ???????????????????????????????????????   Lori Romero PsyD, LP    Postdoctoral Clinical Psychology Fellow    Department of Psychiatry     I provided supervision of this evaluation and I discussed the plan with the above trainee and approve this documentation.        Lori Romero Psy.D., ANEESH  Clinical Supervisor, Department of Psychiatry and Behavioral Sciences        "

## 2023-07-20 NOTE — Clinical Note
Justin Sandoval,  I was able to meet with Kevin today and wrap up the DA. It should be ready for you to review/sign. There is a duplicate note somehow created here - I just entered a note that it was erroneous and hopefully it will let you sign - if not let me know!  Thanks, Luisa

## 2023-07-21 ENCOUNTER — TELEPHONE (OUTPATIENT)
Dept: PSYCHIATRY | Facility: CLINIC | Age: 23
End: 2023-07-21
Payer: COMMERCIAL

## 2023-07-21 NOTE — TELEPHONE ENCOUNTER
This writer called to inquire if Kevin was still able to meet today at 3 as planned. Provided my direct contact number and encouraged Kevin to contact me directly to reschedule if needed.      Luisa Flores, PhD  Postdoctoral Clinical Psychology Fellow  [facility]  Dept of Psychiatry and Behavioral Sciences  Supervising Psychologist: Lori Romero PsyD,   [phone]

## 2023-07-24 ENCOUNTER — OFFICE VISIT (OUTPATIENT)
Dept: MATERNAL FETAL MEDICINE | Facility: HOSPITAL | Age: 23
End: 2023-07-24
Attending: OBSTETRICS & GYNECOLOGY
Payer: COMMERCIAL

## 2023-07-24 ENCOUNTER — ANCILLARY PROCEDURE (OUTPATIENT)
Dept: ULTRASOUND IMAGING | Facility: HOSPITAL | Age: 23
End: 2023-07-24
Attending: OBSTETRICS & GYNECOLOGY
Payer: COMMERCIAL

## 2023-07-24 DIAGNOSIS — Z36.4 ENCOUNTER FOR ANTENATAL SCREENING FOR FETAL GROWTH RETARDATION: ICD-10-CM

## 2023-07-24 DIAGNOSIS — O26.843 SIZE OF FETUS INCONSISTENT WITH DATES IN THIRD TRIMESTER: Primary | ICD-10-CM

## 2023-07-24 DIAGNOSIS — O26.90 PREGNANCY RELATED CONDITION: ICD-10-CM

## 2023-07-24 PROCEDURE — 76816 OB US FOLLOW-UP PER FETUS: CPT

## 2023-07-24 PROCEDURE — 99207 PR NO CHARGE LOS: CPT | Performed by: OBSTETRICS & GYNECOLOGY

## 2023-07-24 PROCEDURE — 76816 OB US FOLLOW-UP PER FETUS: CPT | Mod: 26 | Performed by: OBSTETRICS & GYNECOLOGY

## 2023-07-24 NOTE — PROGRESS NOTES
Please refer to ultrasound report under 'Imaging' Studies of 'Chart Review' tabs.    Umer Mackay M.D.

## 2023-07-24 NOTE — NURSING NOTE
Kevin Urena is a  at 34w1d who presents to [facility] for a follow-up growth ultrasound. Pt reports positive fetal movement. Pt denies bldg/lof/change in discharge, contractions, headache, vision changes, chest pain/SOB or edema. SBAR given to Dr. Mackay, see note in Epic.     Esha Charlton RN

## 2023-07-31 ENCOUNTER — PRENATAL OFFICE VISIT (OUTPATIENT)
Dept: MIDWIFE SERVICES | Facility: CLINIC | Age: 23
End: 2023-07-31
Payer: COMMERCIAL

## 2023-07-31 VITALS
HEART RATE: 85 BPM | BODY MASS INDEX: 23.22 KG/M2 | HEIGHT: 61 IN | SYSTOLIC BLOOD PRESSURE: 108 MMHG | OXYGEN SATURATION: 99 % | DIASTOLIC BLOOD PRESSURE: 62 MMHG | WEIGHT: 123 LBS

## 2023-07-31 DIAGNOSIS — N89.8 VAGINAL DISCHARGE DURING PREGNANCY, ANTEPARTUM: ICD-10-CM

## 2023-07-31 DIAGNOSIS — F33.9 EPISODE OF RECURRENT MAJOR DEPRESSIVE DISORDER, UNSPECIFIED DEPRESSION EPISODE SEVERITY (H): ICD-10-CM

## 2023-07-31 DIAGNOSIS — F41.9 ANXIETY: ICD-10-CM

## 2023-07-31 DIAGNOSIS — O26.899 VAGINAL DISCHARGE DURING PREGNANCY, ANTEPARTUM: ICD-10-CM

## 2023-07-31 DIAGNOSIS — F31.9 BIPOLAR AFFECTIVE DISORDER, REMISSION STATUS UNSPECIFIED (H): ICD-10-CM

## 2023-07-31 DIAGNOSIS — O09.93 PREGNANCY, SUPERVISION, HIGH-RISK, THIRD TRIMESTER: Primary | ICD-10-CM

## 2023-07-31 LAB
CLUE CELLS: ABNORMAL
TRICHOMONAS, WET PREP: ABNORMAL
WBC'S/HIGH POWER FIELD, WET PREP: ABNORMAL
YEAST, WET PREP: PRESENT

## 2023-07-31 PROCEDURE — 87210 SMEAR WET MOUNT SALINE/INK: CPT | Performed by: ADVANCED PRACTICE MIDWIFE

## 2023-07-31 PROCEDURE — 99207 PR PRENATAL VISIT: CPT | Performed by: ADVANCED PRACTICE MIDWIFE

## 2023-07-31 ASSESSMENT — EDINBURGH POSTNATAL DEPRESSION SCALE (EPDS)
TOTAL SCORE: 13
THINGS HAVE BEEN GETTING ON TOP OF ME: YES, MOST OF THE TIME I HAVEN'T BEEN ABLE TO COPE AT ALL
I HAVE FELT SCARED OR PANICKY FOR NO GOOD REASON: NO, NOT AT ALL
THE THOUGHT OF HARMING MYSELF HAS OCCURRED TO ME: HARDLY EVER
I HAVE BEEN ANXIOUS OR WORRIED FOR NO GOOD REASON: YES, SOMETIMES
I HAVE BEEN SO UNHAPPY THAT I HAVE HAD DIFFICULTY SLEEPING: YES, SOMETIMES
I HAVE BEEN ABLE TO LAUGH AND SEE THE FUNNY SIDE OF THINGS: AS MUCH AS I ALWAYS COULD
I HAVE FELT SAD OR MISERABLE: YES, QUITE OFTEN
I HAVE BLAMED MYSELF UNNECESSARILY WHEN THINGS WENT WRONG: YES, SOME OF THE TIME
I HAVE LOOKED FORWARD WITH ENJOYMENT TO THINGS: AS MUCH AS I EVER DID
I HAVE BEEN SO UNHAPPY THAT I HAVE BEEN CRYING: ONLY OCCASIONALLY

## 2023-07-31 NOTE — PROGRESS NOTES
Here alone for a routine prenatal visit at 35w1d. Reports normal fetal movements. Denies regular painful contractions, bleeding, leaking, changes in fetal movement. Reporting some greenish vaginal discharge.  Denies vaginal irritation or odor.  Accepts self collect wet prep today.  States her moods are up and down, last week was hard but this week she is feeling emotionally more stable.  Is unable to connect with her therapist due to the visits being virtual.  She is requesting a referral for in person therapy and accepts a new referral other than continuing with her current therapist.  Referral entered today.  Agrees to contract for safety and call [phone] or the on-call CNM with any suicidal thoughts or plans.  Plan GBS and hemoglobin at next visit.    Reviewed term labor precautions, warning signs and when/how to call the on-call CNM. Encouraged weekly visits until birth.

## 2023-07-31 NOTE — PATIENT INSTRUCTIONS
"\"We hope you had a positive experience and that you can definitely recommend [facility] to your family and friends. You ll be receiving a survey soon and we look forward to hearing your feedback\".    [facility]  - Contact information:  Appointment line and to get a hold of CNM in clinic Monday-Friday 8 am - 5 pm:  [phone].  There are some clinics with early start times (1st appointment 7:40 am) and others with evening hours (last appointment 6:20 pm).  Most are typically open from 8 am to 5 pm.    CNM on call answering service: [phone].  Specify your hospital of choice and leave a brief message for CNM;  will then page CNM who is on call at your specified hospital and you should receive a call back with 15 minutes.  Be sure that your ringer is audible and that you can accept blocked calls so that we can get back in touch with you! This number should be reserved for urgent needs if during the day, before 8 am, after 5 pm, weekends, holidays.    Contact the on-call CNM with warning signs, such as:  vaginal bleeding   Vaginal discharge and itching or pain and burning during urination  Leg/calf pain or swelling on one side  severe abdominal pain  nausea and vomiting more than 4-5 times a day, or if you are unable to keep anything down  fever more than 100.4 degrees F.     aVinci Mediahart  After each of your visits you are welcome to check Seiratherm for your visit summary including education and links to information relevant to your pregnancy and/or well woman care.   Find the \"Visits\" tab at the top of the page, you will see a list of recent visits and for each visit a for link for \"View After Visit Summary.\" View of your After Visit Summary will allow you to read our recommendations from your visit, review any education provided, and link to websites with useful information.   If you have any questions or difficulty navigating [employer], please feel free to " "contact us and we will do our best to direct you.  Meet the Midwives from [facility]  You are invited to an informational meet and greet with [facility] Certified Nurse-Midwives. Our free \"Meet the Midwives\" event is a great opportunity to learn about our midwives' philosophy and experience, the hospitals where we can assist with your birth, and answer questions you may have. Partners, friends, and family are welcome to attend. Currently, this is a virtual event.  Date  Last Thursday of every month at 7 pm.    Link to next (live) meeting  [email or web address]  To Join by Telephone (audio only) Call:   [phone] Phone Conference ID: [phone] #    Touring the Maternity Care Center  At this time we are offering a virtual tour of the Maternity Care Centers at both [facility] and [facility]:   [facility] Maternity Care:   https://[employer].[facility] CountyThe One World Doll Project.NextFit/locations/the-birthplace-at--[facility] Maternity Care:   [email or web address]://NvigenDigital Dandelion.NextFit/locations/[facility]    Scroll to the bottom of this hyperlink if the above link does not work      Postpartum Depression  The first weeks of caring for a  baby are more than a full-time job. Although it is often a happy time, your feelings and moods may not be what you expected. Many women experience  baby blues.  Here are some tips to help you understand when feelings of sadness are normal, and when you should call your health care provider.    What are the baby blues?  As many as 3 of every 4 women will have short periods of mood swings, crying, or feeling cranky or restless during the first weeks after birth. These feelings can be worse when you are tired or anxious. Women who have the baby blues often say they feel like crying but don t know why. Baby blues usually happen in the first or second week " postpartum (after you give birth) and last less than a week. If you are not sleeping, becoming more upset, don t feel like you can take care of your baby, or your sadness lasts 2 weeks or more, call your health care provider.    What is postpartum depression?  About one in every 5 women will develop depression during the first few months postpartum that may be mild, moderate, or severe. Women who have postpartum depression may have some of these symptoms:  Feeling guilty   Not able to enjoy your baby and feeling like you are not bonding with your baby    Not able to sleep, even when the baby is sleeping  Sleeping too much and feeling too tired to get out of bed  Feeling overwhelmed and not able to do what you need to during the day  Not able to concentrate  Don t feel like eating  Feeling like you are not normal or not yourself anymore  Not able to make decisions  Feeling like a failure as a mother  Feeling lonely or all alone  Thinking your baby might be better off without you  If you have any of these symptoms, call your health care provider!    Which symptoms of postpartum depression are dangerous?  Sometimes a woman with postpartum depression will have thoughts of harming herself or her baby. If you find yourself thinking about hurting yourself or your baby, call your health care provider immediately.    MOTHERHOOD: THE EARLY DAYS  You prepare for the birth of your baby for many months during pregnancy, and then the first months at home after your baby is born can be a quiet, gentle time of getting to know this new person who has come to live in your home. But for most women it is not all quiet or sweet. And for some women it is a very hard time.  What Can I Expect in the First Few Months After the Baby Comes?  New mothers and their families face many challenges in the first few months:  Your body and your hormones have to get back to normal.  You and the baby will be learning to breastfeed.  Babies only sleep a  few hours at a time. The entire family will have a hard time getting enough sleep.  You and your family need to learn how to parent this new family member.  If you have a partner, you have to figure out how to stay together as a couple and maybe even start to have sex again.  You may have to figure out how to keep from getting pregnant again right away.  You may need to return to work and find day care.    How Long Will it Take for My Body to Get Back to Normal?  Some changes will occur quickly. Others will not occur as quickly.  Your uterus, cervix, and vagina will all shrink to their nonpregnant size in about 2 weeks. Your vagina may be tender and dry for a few months--especially if you are breastfeeding.  If you had stitches or hemorrhoids, your   bottom   will be sore for 2 weeks or more.  For some women who have problems urinating, it can take several months for you to be able to hold your urine when you cough or sneeze or suddenly  something heavy.  Your breast milk will   come in   2 to 3 days after the birth of your baby. It will take 6 to 8 weeks for you and the baby to get the hang of breastfeeding and find a pattern. During these first weeks, you can have engorged breasts at times and often leak milk.  Your stomach and intestines all have to fall back into place. You may have a lot of gas for a few weeks.  You may be constipated--especially if you are breastfeeding.  Your stretched stomach muscles can recover in a few weeks, but for some women it takes longer--6 months or a year--to recover.  If you had a  delivery, you may have pain or numbness around the incision for 6 months or more.  Losing the weight you gained during pregnancy will probably take 6 months to a year. Have patience! It took 40 weeks to get here. Give yourself 40 weeks to get back.    What Can I Expect When My Hormones Change?  About 75% of all women will get the   blues.   This usually starts about 3 days after the birth  of your baby. You may cry easily and feel very, very tired. A few women become very depressed. If you had a  delivery or your new baby was sick, you are at a higher risk for depression.  Call your health care provider right away if you cannot care for yourself or your baby, if you feel very nervous or worried, if you cannot stop crying, or if you are having thoughts of hurting yourself or your baby.    Taking Care of Yourself  While you are still pregnant:  Talk with your partner and your family about the time ahead. Arrange for someone to help you during the first weeks at home if you can.  Talk with your health care provider about birth control options and make a plan before the baby comes.  If you are worried about how to parent a , take parenting classes. You will learn a lot about how babies act and you will make some friends who are going through the same thing at the same time. Most [facility]ending [facility] to [facility] have these classes.  Arrange for someone to help with baby care if you can.  After the baby comes:  Ask for help. Let other people do the cooking and cleaning and run the house. Focus on yourself and your baby.  Sleep whenever you can. Try not to be tempted to   get some things done   when the baby sleeps. This is your time to sleep, too.  Drink lots of water. You will need at least 6 big glasses of water everyday to avoid constipation and make enough breast milk. Every time you sit down to breastfeed, have a big glass of water with you to drink while you are nursing.  Eat lots of vegetables and fruit. You will need lots of vitamins and fiber to help your body get back to normal. This will also help you avoid constipation.  Go outside and walk. Babies can go outside even if it is very cold. Fresh air and sunshine will do you both good.  Take sitz baths. Put about 6 inches of warm water in your bathtub and sit in there for 15 minutes 2 to 3 times a day. This will help your   bottom   heal more quickly. It  will also give you 15 minutes of private time!  Talk to other mothers. Join a new parents group. Call [place] and go to [facility] meetings if you are breastfeeding.     With your partner:  Keep talking. Share the experience.  Spend time alone. Even a 30-minute walk can be a date.  Start a birth control method. You can get pregnant before you even have a period. It is very important to use birth control if you do not want to get pregnant again right away.  When you have sex, use a lubricant. A lot of lubricant! Take it slow.  The first few months after a baby comes can be a lot like floating in a jar of honey--very sweet and naik, but very sticky, too. Take time to enjoy the good parts. Remind yourself that this time will pass. Bon voyage!    FOR MORE INFORMATION  For questions about depression during and after pregnancy:  http://www.womenshealth.gov/publications/our-publications/fact-sheet/depression-pregnancy.html   After birth: The first 6 weeks:  http://www.[employer]/Post-Birth-and-Recovery   Breastfeeding resources:  http://www.Mobile Roadie.org/health-info/getting-breastfeeding-off-to-a-good-start/    Preparing for your baby:       Car Seat Clinics:  https://dps.[place].gov/divisions/ots/child-passenger-safety/Pages/car-seat-checks.aspx    [place] Safety [place]: http://www.[place]afetycouncil.org/family/carseatindex.cfm    Child Passenger Safety: Buckle Up Right    When child safety seats and safety belts are used correctly, they can reduce the risk of death by up to 70%. But finding the right combination can be confusing.  How do you know if you should be using an infant-only seat or a convertible seat?  What are booster seats and why do kids need them until they're eight years old or are four feet nine inches tall?  How do you know when a child is ready for your car's safety belt/shoulder strap?  The American Academy of Pediatrics (AAP) recommends that all infants and toddlers should ride  in a Rear-Facing Car Safety Seat until they are two (2) years of age or until they reach the highest weight or height allowed by their car safety seat's .    A child who is both under age 8 and shorter than 4 feet 9 inches is required to be fastened in a child safety seat that meets federal safety standards. Under this law, a child cannot use a seat belt alone until they are age 8, or 4 feet 9 inches tall. It is recommended to keep a child in a booster based on their height rather than their age. Check the instruction book or label of the child safety seat to be sure it is the right seat for your child's weight and height.    www.CarSeatsMadeSimple.org    Car Seat Recycling, -    Get free expert help in a [place] community near you:  [place] Child Passenger Safety Checkup Clinic Calendar    How to Find the Right Car Seat (NHTSA)  Safe Kids [place]    Print Materials  Basic Car Seat Safety checklist  Don't Skip a Step brochure (English); (Yi); (Austrian)  Good Going! Adventures in Safety magazine  Fact Sheets  Booster Seat Safety  Outdated and Used Child Safety Seats  A Parents' Checklist: Traffic Safety  Driving Your Child to School  Occupant Protection (Safety Belts and Child Safety Seats): Frequently Asked Questions; Misconceptions and Myths; [place] Laws  Air Bags: How Do Air Bags Work; Frequently Asked Questions      Immunizations:  http://www.cdc.gov/vaccines/schedules/easy-to-read/child.html    [place] Screening Program  Http://www.health.[facility].[place].us/newbornscreening/  [place] newborns are tested soon after birth for more than 50 hidden, rare disorders, including hearing loss and critical congenital heart disease (CCHD). This site provides resources and information for families and providers.    Ask your health care provider about vaccinations you may need following delivery. By now, you should have received a Tdap immunization to protect against pertussis or whooping cough.  Fathers and family members who will be in close contact with the baby should also receive a Tdap shot at least two weeks before the expected birth of the baby if they have not had a Td (tetanus) shot for at least two years.    Your midwife may offer to check your cervix for changes. If you are past your due date, discuss the next steps leading to delivery with your midwife. If you don't start labor on your own by 41 or 42 weeks, your midwife may recommend giving you medicines to ripen your cervix and start labor.  Induction of labor: http://onlinelibrary.donovan.com/store/10.1016/j.jmwh.2008.04.018/asset/j.jmwh.2008.04.018.pdf?v=1&t=crww7ffe&i=[email or web address]    Tell your midwife or physician how you plan to feed your baby (breast or bottle), who you have chosen to do pediatric care for your baby, and if you have a boy, whether you have chosen to have him circumcised. You will need a car seat correctly installed in your vehicle to bring your baby home. As you start to set up the nursery at home for your baby, make sure the crib is safe. The mattress needs to fit snugly against the edges of the crib. If you can fit a soda can between the bars, they are too far apart and can allow the baby's head to caught between them.    Learn about infant care and feeding, including information about infant CPR. We recommend that you put your baby to sleep on his or her back to reduce the chance of Sudden Infant Death Syndrome (SIDS). To maintain a healthy environment in which your child can grow, it's best to keep your home smoke-free. By preparing ahead, your transition into parenthood will go smoothly for you and your baby.    Your midwife will want to see you for a checkup 2 to 6 weeks after delivery.      Making Plans for Feeding My Baby    By this point, you probably have read a lot about feeding your baby.  Breastfeed or formula? Each mother s decision is her own and [facility]wives  [facility] respects you and your choices. We ve gathered information on both breastfeeding and formula feeding to help with your decision. Talking with your physician or nurse-midwife can also help in your decision.  However you plan to feed your baby, [facility] encourage rooming in with your baby, skin-to-skin contact and feeding your baby based on his or her cues.    Skin-to-skin contact  Being close to mom helps your baby adjust to life outside of the womb.  It helps your baby regulate their body temperature, heart rate, and breathing.  Your baby will usually be placed skin-to-skin immediately following birth or as soon as possible, if medical intervention is needed.    Rooming-In  Having your baby stay with you in your room is called  rooming-in .  Keeping your baby in your room helps you to learn how to care for your baby by getting to know your baby s cues, body rhythms and sleep cycle.       Cue-based feeding  Cues (signals) are baby s way of telling you what he or she wants.  When you learn your infant s cues, you know how to care for and feed your baby.   Feeding cues are the licking and smacking of lips, bringing their fist to their mouth, and a reflex called  rooting - where baby turns and opens his or her mouth, searching for the breast or bottle.  Crying is a late feeding cue.  Babies can feed frequently, often at least 8 times in 24 hours.  Breastfeeding facts  Breast milk is the best source of nutrition for your baby and is available at birth. In the first couple of days, your milk volume is already starting to increase, though it may not be noticeable. Breastfeed frequently to increase your milk supply. Within three to five days, you will begin to notice larger milk volumes. An increase in breast size, heaviness and firmness are often described as the milk  coming in.  Frequent breastfeeding can help breasts from getting overly firm and painful. You will know the baby  is getting enough milk if your baby is having wet and dirty diapers and gaining weight.     If your goal is to exclusively breastfeed, it is important to not use any formula or artificial nipples (including bottles and pacifiers) while your baby is learning to breastfeed.  While it may seem like an  easy  option to give your baby a bottle, formula should only be given if there is a medical reason for your baby to have it.    Positioning and attachment   Get comfortable.  Use pillows as needed to support your arms and baby.  Hold baby close at the level of your breast, facing you in a tummy to tummy position.  Skin to skin helps with this.  Position the baby with his or her nose by the nipple.  There should be a straight line from baby s ear to shoulder to hips.  Tickle your baby s lips or wait for baby to open mouth wide, bring baby to breast by leading with the chin.  Aim the nipple at the roof of baby s mouth.  A rapid sucking pattern is followed by longer, drawing pattern with occasional swallows heard.  When baby is correctly latched, your nipple and much of the areola are pulled well into baby s mouth.      Returning to work or school  Focus on a good start to breastfeeding.  Many women continue to provide breastmilk for their baby when they return to work or school.  Making plans about where to pump and store milk can make the transition go well.  Talk with other mothers who have also returned to work or school for tips and support.  Your employer s Human Resource department may be a resource as well.     Returning to work or school: (continued)   babies can mean fewer  sick  days for you.  A quality breast pump will also save time and add comfort.  Check with your insurance prior to giving birth for breast pump coverage.  Many insurance companies include a pump within your benefits.  Wait until your baby is at least three weeks old to introduce a bottle for the first time.  Have someone besides you  give the bottle.  Breastfeed when you are with your baby. Reserve your bottles of breast milk for when you are away.   Your breasts will need to be  emptied  either by your baby or a pump.  Plan to pump at least twice in an eight hour day.  If you cannot pump at work, continue breastfeeding at home. Any amount of breast milk is worth giving to your baby.    Formula feeding facts  If you are planning to use formula to feed your baby, you will want to make some preparations ahead of time. Talk to your doctor or nurse-midwife about what type of formula to use. Some are iron-fortified, meaning they have extra iron in them. You will want to purchase formula and bottles before your baby is born to be sure you are ready after you return from the hospital. The [facility] do not provide formula samples to take home.    Be sure to follow formula mixing directions closely. Regular milk in the dairy case at the grocery store should not be given to babies under 1 year old. Baby formula is sold in several forms including:  Ready-to-use. This is the most expensive, but no mixing is necessary.  Concentrated liquid. This is less expensive than ready-to-use and you mix with water.  Powder. This is the least expensive. You mix one level scoop of powdered formula with two ounces of water and stir well.    Most babies need 2.5 ounces of formula per pound of body weight each day. This means an 8-pound baby may drink about 20 ounces of formula a day; however, this is just an estimate. The most important thing is to pay attention to your baby s cues.  If your baby is always fussy, needs more iron or has certain food allergies, your physician may suggest you change your baby s formula to a different kind.     How do I warm my baby s bottles?  You may feed your baby a bottle without warming it first. It is OK for the breast milk or formula to be cool or room temperature. If your baby seems to prefer it warmed, you can put the  filled bottle in a container of warm water and let it stand for a few minutes. Check the temperature of the liquid on your skin before feeding it to your baby; to be sure it isn t too hot. Do not heat bottles in the microwave. Microwaves heat food and liquids unevenly, and this can cause hot spots that can burn your baby.    How do I clean and sterilize bottles?  Sterilize bottles and nipples before you use them for the first time. You can do this by putting them in boiling water for 5 minutes. After that first time, you can wash them in hot and soapy water. Rinse them carefully to be sure there is no soap left on them. You can also wash them in the .    Childbirth and Parenting Education:       Everyday Miracles:   https://www.everyday-miracles.org/    Free Video Series from [facility]: https://nursing.[facility]/academics/specialty-areas/nurse-midwifery/having-baby-prenatal-videos/having-baby-prenatal-and    Childbirth Education virtual (live) classes: www.TextCorner/classes  The Birth Hour: https://Apixio/online-childbirth-class/  BirthED: https://www.birthedmn.com/  [facility] parenting center: http://[facility] HospitalOctavian/   (856) 191-BABY  [place]: (education, yoga & wellness) www.[employer]  Enlightened Mama: www.enlightenedmama.reQall   Childbirth collective: (Parent topic nights)  www.childbirthcollective.org/  Hypnobabies:  www.hypnobabiestwincities.com/  Hypnobirthing:  Http://hypnobirthing.reQall/  Hypnobirthing virtual class: www.Clever Machine/hypnobirthing    Information about doulas:  Childbirth collective: http://www.childbirthcollective.org/  Doulas of [place] (YOBANY):  www.yobany.org  [facility]  project: http://britebilltiesdoulaproject.com/     Early Childhood and Family Education (ECFE):  ECFE offers parents hands-on learning experiences that will nourish a lifetime of teachable moments.  http://ecfe.info/ecfe-home/    APPS and Podcasts:   Ovia  Glow  "Nurture    Evidence Based Birth  The Birth Hour (for birth stories)   Birthful   Expectful   The Longest Shortest Time  PregnancyPodcast Yasmin Richanguyen    Book Recommendations:   Jen Brianna's Birthing From Within--first few chapters include a new-age tone, you may prefer to skip it and keep going, because there is good stuff later.  This book recommendation covers emotional preparation, but does cover coping with pain, and use of both pharmacological and nonpharmacological methods.      Guide to Childbirth by Deneen Grant  Childbirth Without Fear by Judi Aranda Read    Dr. Browning' The Pregnancy Book and The Birth Book--the pregnancy book goes month-by month    The Birth Partner by Marie Cason    Womanly Art of Breastfeeding by La Leche League International   Bestfeeding by Josette Manuel--great pictures    Mothering Your Nursing Toddler, by Rachana Moe.   Addresses dealing with so many of the challenging behaviors of a nursing toddler.  How Weaning Happens, by La Leche League.  Discusses weaning at all ages, from medically necessary weaning of an infant, all the way up to age 5 (or older), with why/why not, and strategies.  Very empowering book both for deciding to wean and deciding not to.    American College of Nurse-Midwives (ACNM) http://www.midwife.org/; look at the informational handouts at http://www.midwife.org/Share-With-Women     www.mymidwife.org    Mother to Baby (Medication and Herbal guidance in pregnancy): http://www.mothertobaby.org  Toll-Free Hotline: 605.632.4389  LactMed (Medication use while breastfeeding): http://toxnet.nlm.nih.gov/newtoxnet/lactmed.htm    Women's Health.gov:  http://www.womenshealth.gov/a-z-topics/index.html    American pregnancy association - http://americanpregnancy.org    Centering Pregnancy (group prenatal care option): http://centeringhealthcare.org    March of Dimes www.[address] - Nutrition  www.mypyramid.gov  Under \"For Consumers,\" click on " "\"pregnant and breastfeeding women.\"      Centers for Disease Control and Prevention (CDC) - Vaccines : http://www.cdc.gov/vaccines/       When researching information on the web, question the validity of websites.  The [employer] .gov, .edu and.org tend to be more reliable information.  If there are a lot of advertisements, be cautious of the information provided. Stay away from blogs and chat rooms please!     [facility] Breastfeeding Support    Early Childhood Family [place] [address] provides a free, drop-in class/breastfeeding support group, facilitated by a lactation consultant and .  During the group you can connect with other parents, weigh your baby, ask questions about feeding and baby development, and more.  You do not need to register.  Fall in-person meetings will begin on , are for parents of babies from birth to 9 months, and will meet on Monday evenings from 6 - 7:15 pm in [facility], which is at [address] [place] [address].  [place] [address] also offers virtual group meetings with a lactation consultant/.  These take place on , from 11:30 am - 12:30 pm for parents of newborns to 3-month-olds, and from 4:15 - 5:15 for parents of 3 - 9 - month olds.  To get the meeting link contact Cata Ortega at [phone].    [facility] offers a free, drop-in breastfeeding support group facilitated by MEG Scott.   at [facility] [facility] [address], [place].  A scale is available to check baby weights, if desired.  [place] also has a variety of new parent classes:  (cost for registration)  https://[facility], Hillcrestalphacityguides.Lending Works/classes/    [facility] Lactation unge facilitated by MEG Darden:  Free, drop-in support group for breastfeeding, with baby scale available if needed.  Meets at [facility], second Tuesday of each month, 10 am - 12 pm.  Text [phone] for info.    [facility] Support " "Group, Tuesdays at 10:30 am   Run by MEG Sparrow of [employer] [facility]act[facility]   Go to [facility] Facebook page, click on \"events\" for link:   Https://www.Polybiotics.com/events/161387192174246/    [facility] breastfeeding support community:  free, drop-in breastfeeding support facilitated by Certified Lactation Counselors, open Mondays and Wednesdays from 1 pm - 5 pm.  In [place]:  [facility] [place], [facility] [facility]:   guidingstarw[place].org    [facility] Milk Hour, Thursdays at 2:30 pm    Run by Masoud Martel, MEG  Go to [facility] FB page and send message to get link   Https://www.Polybiotics.[employer]/Gamisfactionfoundations/    Mejia Sibley:  http://www.mentionlondas.org/    [facility] offers a Lactation Lounge every Friday 12pm - 1pm, run by Mejia Alves Leader.  Sign up via link at Sonopia/cbe-lactation   https://www.Sonopia/cbe-lactation    [facility] is offering virtual support groups every Monday, 10:30 am - 12 pm, run by RN IBCLC: Https://www.Polybiotics.com/events/053881670608699/    Culturally-Specific Breastfeeding Support:     For Hmong Families:   The Hmong Breastfeeding Coalition is a wonderful support for [place] Hmong women who are breastfeeding.  They are best found on Facebook.    for Black families:    Chocolate Milk Club:  http://www.FitclineselsmidwiWaterstone [employer]aceuticals.[employer]/chocolate-milk-club/  Email: Kael@RightSignature    R.O.S.E. = Reaching our Sisters Everywhere  Http://www.breastfeedingrose.org/    Club Mom breastfeeding support for Black mothers:  Contact Dennis Winn  Phone: [phone]   Email:  [email or web address].     Christi Desai  Phone: [phone]   Email:  [email or web address].    Club Dad parent support for Black fathers:   Contact Logan Palomo   Phone: [phone]   Email:  " "[email or web address]@[email or web address].    For Native/Indigenous Families:    https://www.kinkon.com/groups/nitawilda.gimashkikinaan     Additional Resources:  La Leche Datometryjazmine is an international, nonprofit, nonsectarian organization offering information, education, and support to mothers who want to breast-feed their babies. Local groups offer phone help and monthly meetings. Visit BabyWatch.org or DNA Games.org and us the  Find local support  drop down menu or click on the  Resources  tab.    [place] Breastfeeding Resources: 0-093-312-BABY (2229) toll free    National Breastfeeding Help Line trained breastfeeding peer counselors can help answer common breast-feeding questions by phone. Monday-Friday: English/Maltese  7-668- [phone] toll free, [phone] (TTY)    Virtual Breastfeeding Support:    During this time of isolation, breastfeeding families need even more community!  Here are some area organizations offering virtual support groups for breastfeeding:    [facility] Cafe Support Group, Tuesdays at 10:30 am   Run by MEG Sparrow of [facility]   Go to [facility] Facebook page and click on \"events\" for link   https://www.kinkon.com/events/037816914442843/  [facility] Milk Hour, Thursdays at 2:30 pm    Run by MEG Muro   Go to [facility] FB page and send message to get link   https://www.kinkon.com/healthfoundations/  [facility] holding virtual meetings the first Tuesday of each month, 8-9 pm, and the   Third Saturday, 10 - 11 am.  Go to [facility] FB page; message to get link [email or web address]://www.kinkon.com/Kathernie/?hc_location=[facility]  [facility] offers a Lactation Lounge every Friday 12pm - 1pm, run by Mckayla AlvesOdessa Regional Medical Centerjazmine Leader   Sign up via link at https://www.Luxola/cbe-lactation  [place] " [facility] is offering virtual support groups every Monday, 10:30 am - 12 pm, run by nurse MEG   Https://www.facebook.com/events/004007076128659/    Prenatal Breastfeeding Classes:      [employer] is offering virtual breastfeeding and  care classes:  https://www.3FLOZ/education-workshops  BirthEd childbirth and breastfeeding education offering virtual prenatal breastfeeding classes  https://www.birthedmn.com/workshops

## 2023-08-02 ENCOUNTER — TELEPHONE (OUTPATIENT)
Dept: PSYCHIATRY | Facility: CLINIC | Age: 23
End: 2023-08-02
Payer: COMMERCIAL

## 2023-08-02 ENCOUNTER — VIRTUAL VISIT (OUTPATIENT)
Dept: PSYCHIATRY | Facility: CLINIC | Age: 23
End: 2023-08-02
Attending: PSYCHOLOGIST
Payer: COMMERCIAL

## 2023-08-02 DIAGNOSIS — F33.2 SEVERE EPISODE OF RECURRENT MAJOR DEPRESSIVE DISORDER, WITHOUT PSYCHOTIC FEATURES (H): Primary | ICD-10-CM

## 2023-08-02 PROCEDURE — 90832 PSYTX W PT 30 MINUTES: CPT | Mod: VID

## 2023-08-02 NOTE — PROGRESS NOTES
"WOMEN'S WELLBEING PROGRAM  OUTPATIENT PSYCHOTHERAPY PROGRESS NOTE    Client Name: Kevin Urena   YOB: 2000 (22 year old)   Date of Service:  Aug 2, 2023  Time of Service: 2:29 pm to 2:51 pm (22 minutes)  Service Type(s): 81271 psychotherapy (16-37 min. With patient and/or family)  Type of service: Telemedicine Psychotherapy   Reason for Telemedicine Visit: transportation barriers, to foster engagement  Mode of transmission: Secure real time interactive audio and visual telecommunication system ([place] [employer]); used audio-only due to patient's recent move this morning and she did not have internet services established yet  Location of originating and distant sites:    Originating site (patient location): patient home     Distant site (provider site): HIPAA compliant location within provider home  Telemedicine Visit: The patient's condition can be safely assessed and treated via synchronous audio and visual telemedicine encounter. Patient/family has agreed to receive services via telemedicine technology.    Diagnoses:     Major depressive disorder, recurrent, severe, without psychotic features, with anxious distress (F33.2)    Individuals Present:   Patient and provider     Treatment goal(s) being addressed:   1. Review findings and feedback from recent diagnostic assessment (see report dated 7/20/23)  2. Answer any questions Kevin may have   3. Review treatment recommendations, share resources, and partner with Kevin to develop initial treatment plan to support her needs    Subjective:  Kevin reported she and her fiance and step-daughter moved out of her mother's home this morning and into their own apartment. She is feeling \"very excited and relieved that we have our own place.\" Kevin noted a mild improvement in her mood since our appointment two weeks ago. She is currently 35 weeks and 3 days and stated she is looking forward to meeting her son, Cyrus.      Treatment:   The " focus of today's appointment was to discuss findings and feedback from our recent diagnostic assessment (see report dated 7/20/23), answer any questions Kevin may have, review treatment recommendations, share resources, and partner with Kevin to develop initial treatment plan to support her needs. Began by sharing and discussing findings from diagnostic interview related to Kevin's questions about diagnoses her mother has told her she carries, including Bipolar Disorder and Autism. This writer shared with Kevin that based on what she shared in our interview, she does not meet criteria for either Bipolar Disorder or Autism. Instead, Kevin is endorsing symptoms of severe depression. Provided brief psychoeducation on depression and treatment options. Shared recommendation that based on Kevin's report of chronic, longstanding concerns, and frequency/disruption of current symptoms we believe a higher level of treatment is the best fit for her needs. Provided information on Mother Baby Program as strongest recommendation, and answered questions. Kevin was open/enthusiastic about plan to place her on Mother Baby waitlist and initiate intensive treatment as soon as an opening is available. Planned for this writer to refer Kevin to Mother Baby and for Mother Baby to follow up with Kevin directly. Discussed option for Kevin to see this writer as a bridging provider for support if there is a waitlist.     Discussed recommendation that for Kevin's needs/symptom level we consider therapy + medication as the gold standard of treatment. Acknowledged Kevin's treatment preference to wait to initiate medication until after delivery. Offered option to reschedule medication consultation with Women's Wellbeing Team after delivery, and Kevin was open/enthusiastic about this plan.     Discussed resources Kevin may benefit from before and after delivery. Kevin was  open/enthusiastic about connecting with Women's Wellbeing Program , Carmen Delgado, [facility], to discuss financial/housing/transportation/childcare resources, and provided permission for Carmen to contact her directly. Kevin was open/enthusiastic about referral to home visiting program, with special interest in Parents as Teachers program. Kevin provided permission for this writer to place referral and be contacted directly for follow-up.     Kevin expressed desire to further consider virtual or in-person support groups, including those offered by Postpartum Support International. She requested this writer provide information about these groups via YourListen.com.     Thanked Kevin for sharing her story with us, and recognized strengths she holds as an individual, a couple, a parent to her step-daughter, and an engaged member of her care team. Planned for this writer to follow-up with Kevin next week to confirm referral placements and make additional plans for bridging services if needed prior to start of Mother Baby. Throughout appointment provided psychoeducation, reflective listening, validation, and modeled curiosity/interest and nonjudgmental stance in Kevin's thoughts, feelings, and treatment preferences.     Assessment and Progress:   Appearance:  Was not able to assess due to audio-only appointment as patient had just moved into her apartment and could not yet access internet services   Behavior/relationship to examiner/demeanor: open, relaxed, more upbeat than prior appointment  Motor activity/EPS: Within normal limits  Speech rate:  Within normal limits  Speech volume:  Within normal limits  Speech articulation:  Within normal limits  Speech coherence:  Within normal limits  Speech spontaneity:  Within normal limit  Mood (subjective report): reported mild improvement in mood since initial appointment    Affect (objective appearance): Within normal limits (as observed via  audio-only input)  Thought Process (Associations): Goal directed  Thought process (Rate):  Within normal limits  Thought content: None  Abnormal Perception: None  Insight:  Within normal limits  Judgment:  Within normal limits    Currently, Kevin is motivated to initiate psychotherapy to target symptoms of depression, including depressed mood, thoughts of worthlessness, and chronic suicidal ideation. I believe she will benefit significantly from engaging in treatment. Given her current concerns, symptom severity, and history, we believe Kevin would benefit from a higher level of care and we recommend she participate in the Mother Baby intensive treatment program. Kevin is open/enthusiastic about this plan. We have placed a referral and Kevin knows that Mother Baby program will follow-up with her directly.     Plan:   We agreed to the following next steps to support Kevin's needs and engagement in treatment:     [facility]. Kevin has provided permission and knows the Mother Baby team will follow-up with her directly.     Women's Wellbeing Team intake lead Rosa Kaiser will contact Kevin to help reschedule a medication consultation with reproductive psychiatrists, Pennie Chavarria MD, or JENNIFER Soto, following Kevin's delivery (SOURAV 9/3/23).     A referral to LORNA Crouch, of [facility]'s Women's Wellbeing Program, has been placed. Kevin gave permission for Carmen to contact her directly to discuss financial/housing/transportation/childcare resources.     A referral has been placed to [facility] program on 8/3/23. Kevin gave permission for a public health nurse or staff member to follow-up with her directly.     This writer shared information on Postpartum Support International peer support groups with Kevin via [employer], as requested.     This writer will follow-up with  Kevin in approximately one week to confirm connection to referrals, determine next steps, and plan for bridging care if needed while waiting for an opening with the Mother Baby program.       Treatment Plan review due: upcoming appointment     Luisa Flores, PhD  Postdoctoral Clinical Psychology Fellow    I did not see this patient directly, but have discussed the session with the above trainee and approve this documentation.      Lori Romero Psy.D.,                                                                                         Clinical Supervisor

## 2023-08-02 NOTE — TELEPHONE ENCOUNTER
This writer called to inquire if Kevin was still able to meet today at 2 pm as planned. Provided my direct contact number and encouraged Kevin to contact me directly to reschedule if needed.      Luisa Flores, PhD  Postdoctoral Clinical Psychology Fellow  Women's Wellbeing Program  Dept of Psychiatry and Behavioral Sciences  Supervising Psychologist: Lori Romero PsyD,   [phone]

## 2023-08-02 NOTE — TELEPHONE ENCOUNTER
This writer spoke with Kevin to inquire about finding a time to complete our diagnostic interview, share feedback, resources, and recommendations on next steps to support her, and answer any questions she may have. Kevin shared she is currently moving out of her mother's home and into a new place of her own. She expressed a desire to schedule an appointment for later today, if possible. We agreed to schedule a time to meet today at 2 pm. I encouraged Kevin to contact me if she needed to reschedule or change plans.     Luisa Flores, PhD  Postdoctoral Clinical Psychology Fellow  Women's Wellbeing Program  Supervising Psychologist: Lori Romero PsyD,   [phone]

## 2023-08-07 ENCOUNTER — PRENATAL OFFICE VISIT (OUTPATIENT)
Dept: MIDWIFE SERVICES | Facility: CLINIC | Age: 23
End: 2023-08-07
Payer: COMMERCIAL

## 2023-08-07 VITALS — WEIGHT: 125 LBS | BODY MASS INDEX: 23.62 KG/M2 | SYSTOLIC BLOOD PRESSURE: 110 MMHG | DIASTOLIC BLOOD PRESSURE: 68 MMHG

## 2023-08-07 DIAGNOSIS — O26.843 UTERINE SIZE-DATE DISCREPANCY IN THIRD TRIMESTER: ICD-10-CM

## 2023-08-07 DIAGNOSIS — F31.9 BIPOLAR AFFECTIVE DISORDER, REMISSION STATUS UNSPECIFIED (H): ICD-10-CM

## 2023-08-07 DIAGNOSIS — O09.93 PREGNANCY, SUPERVISION, HIGH-RISK, THIRD TRIMESTER: Primary | ICD-10-CM

## 2023-08-07 DIAGNOSIS — B37.2 YEAST INFECTION OF THE SKIN: ICD-10-CM

## 2023-08-07 LAB
ERYTHROCYTE [DISTWIDTH] IN BLOOD BY AUTOMATED COUNT: 14.4 % (ref 10–15)
HCT VFR BLD AUTO: 35.6 % (ref 35–47)
HGB BLD-MCNC: 12.1 G/DL (ref 11.7–15.7)
HOLD SPECIMEN: NORMAL
MCH RBC QN AUTO: 31.4 PG (ref 26.5–33)
MCHC RBC AUTO-ENTMCNC: 34 G/DL (ref 31.5–36.5)
MCV RBC AUTO: 93 FL (ref 78–100)
PLATELET # BLD AUTO: 216 10E3/UL (ref 150–450)
RBC # BLD AUTO: 3.85 10E6/UL (ref 3.8–5.2)
WBC # BLD AUTO: 7.7 10E3/UL (ref 4–11)

## 2023-08-07 PROCEDURE — 36415 COLL VENOUS BLD VENIPUNCTURE: CPT | Performed by: ADVANCED PRACTICE MIDWIFE

## 2023-08-07 PROCEDURE — 87653 STREP B DNA AMP PROBE: CPT | Performed by: ADVANCED PRACTICE MIDWIFE

## 2023-08-07 PROCEDURE — 99207 PR PRENATAL VISIT: CPT | Performed by: ADVANCED PRACTICE MIDWIFE

## 2023-08-07 PROCEDURE — 85027 COMPLETE CBC AUTOMATED: CPT | Performed by: ADVANCED PRACTICE MIDWIFE

## 2023-08-07 RX ORDER — MICONAZOLE NITRATE 20 MG/G
CREAM TOPICAL 2 TIMES DAILY
Qty: 56.7 G | Refills: 1 | Status: SHIPPED | OUTPATIENT
Start: 2023-08-07 | End: 2023-08-07

## 2023-08-07 ASSESSMENT — PATIENT HEALTH QUESTIONNAIRE - PHQ9
SUM OF ALL RESPONSES TO PHQ QUESTIONS 1-9: 12
SUM OF ALL RESPONSES TO PHQ QUESTIONS 1-9: 12
10. IF YOU CHECKED OFF ANY PROBLEMS, HOW DIFFICULT HAVE THESE PROBLEMS MADE IT FOR YOU TO DO YOUR WORK, TAKE CARE OF THINGS AT HOME, OR GET ALONG WITH OTHER PEOPLE: VERY DIFFICULT

## 2023-08-07 NOTE — PROGRESS NOTES
Here alone for prenatal visit. Feels her mood was worse this past week and she did not get to reach out as she could of. She did connect with a therapist. No self harm plan at this time. She is not hold able. Did discuss ways to seek out help. There is Crisis Line and Mental Health Line if it is urgent she can go the ED.  Sleeping ok other than waking to urinate.   She would like a yeast treatment for her yeast infection. Baby is active. Has a  and is interested in WB.    No vaginal bleeding, LOF, contractions.  No HA, RUQ pain, N/V, visual changes.  Discussed signs of labor and when to call or come in.  Discussed kick counts and fetal movement.  Reportable signs and symptoms discussed.  GBS done today.  Labor precautions discussed.  S<D   RTC 1 week.  Prenatal flowsheet information is reviewed.    Luisa Quintanilla CNM  Answers submitted by the patient for this visit:  Patient Health Questionnaire (Submitted on 8/7/2023)  If you checked off any problems, how difficult have these problems made it for you to do your work, take care of things at home, or get along with other people?: Very difficult  PHQ9 TOTAL SCORE: 12Concerns:

## 2023-08-07 NOTE — PATIENT INSTRUCTIONS
"\"We hope you had a positive experience and that you can definitely recommend [facility] to your family and friends. You ll be receiving a survey soon and we look forward to hearing your feedback\".    [facility] [place] [facility]  - Contact information:  Appointment line and to get a hold of CNM in clinic Monday-Friday 8 am - 5 pm:  [phone].  There are some clinics with early start times (1st appointment 7:40 am) and others with evening hours (last appointment 6:20 pm).  Most are typically open from 8 am to 5 pm.    CNM on call answering service: [phone].  Specify your hospital of choice and leave a brief message for CNM;  will then page CNM who is on call at your specified hospital and you should receive a call back with 15 minutes.  Be sure that your ringer is audible and that you can accept blocked calls so that we can get back in touch with you! This number should be reserved for urgent needs if during the day, before 8 am, after 5 pm, weekends, holidays.    Contact the on-call CNM with warning signs, such as:  vaginal bleeding   Vaginal discharge and itching or pain and burning during urination  Leg/calf pain or swelling on one side  severe abdominal pain  nausea and vomiting more than 4-5 times a day, or if you are unable to keep anything down  fever more than 100.4 degrees F.     IKO Systemhart  After each of your visits you are welcome to check FoodBox for your visit summary including education and links to information relevant to your pregnancy and/or well woman care.   Find the \"Visits\" tab at the top of the page, you will see a list of recent visits and for each visit a for link for \"View After Visit Summary.\" View of your After Visit Summary will allow you to read our recommendations from your visit, review any education provided, and link to websites with useful information.   If you have any questions or difficulty navigating [employer], please feel free to " "contact us and we will do our best to direct you.  Meet the Midwives from [facility]  You are invited to an informational meet and greet with [facility] Certified Nurse-Midwives. Our free \"Meet the Midwives\" event is a great opportunity to learn about our midwives' philosophy and experience, the hospitals where we can assist with your birth, and answer questions you may have. Partners, friends, and family are welcome to attend. Currently, this is a virtual event.  Date  Last Thursday of every month at 7 pm.    Link to next (live) meeting  [email or web address]  To Join by Telephone (audio only) Call:   [phone] Phone Conference ID: [phone] #    Touring the Maternity Care Center  At this time we are offering a virtual tour of the Maternity Care Centers at both [facility] and [facility]:   [facility] Maternity Care:   [email or web address] [email or web address]/the-[email or web address]--[facility] Maternity Care:   [email or web address]    Scroll to the bottom of this hyperlink if the above link does not work      Postpartum Depression  The first weeks of caring for a  baby are more than a full-time job. Although it is often a happy time, your feelings and moods may not be what you expected. Many women experience  baby blues.  Here are some tips to help you understand when feelings of sadness are normal, and when you should call your health care provider.    What are the baby blues?  As many as 3 of every 4 women will have short periods of mood swings, crying, or feeling cranky or restless during the first weeks after birth. These feelings can be worse when you are tired or anxious. Women who have the baby blues often say they feel like crying but don t know why. Baby blues usually happen in the first or second week " postpartum (after you give birth) and last less than a week. If you are not sleeping, becoming more upset, don t feel like you can take care of your baby, or your sadness lasts 2 weeks or more, call your health care provider.    What is postpartum depression?  About one in every 5 women will develop depression during the first few months postpartum that may be mild, moderate, or severe. Women who have postpartum depression may have some of these symptoms:  Feeling guilty   Not able to enjoy your baby and feeling like you are not bonding with your baby    Not able to sleep, even when the baby is sleeping  Sleeping too much and feeling too tired to get out of bed  Feeling overwhelmed and not able to do what you need to during the day  Not able to concentrate  Don t feel like eating  Feeling like you are not normal or not yourself anymore  Not able to make decisions  Feeling like a failure as a mother  Feeling lonely or all alone  Thinking your baby might be better off without you  If you have any of these symptoms, call your health care provider!    Which symptoms of postpartum depression are dangerous?  Sometimes a woman with postpartum depression will have thoughts of harming herself or her baby. If you find yourself thinking about hurting yourself or your baby, call your health care provider immediately.    MOTHERHOOD: THE EARLY DAYS  You prepare for the birth of your baby for many months during pregnancy, and then the first months at home after your baby is born can be a quiet, gentle time of getting to know this new person who has come to live in your home. But for most women it is not all quiet or sweet. And for some women it is a very hard time.  What Can I Expect in the First Few Months After the Baby Comes?  New mothers and their families face many challenges in the first few months:  Your body and your hormones have to get back to normal.  You and the baby will be learning to breastfeed.  Babies only sleep a  few hours at a time. The entire family will have a hard time getting enough sleep.  You and your family need to learn how to parent this new family member.  If you have a partner, you have to figure out how to stay together as a couple and maybe even start to have sex again.  You may have to figure out how to keep from getting pregnant again right away.  You may need to return to work and find day care.    How Long Will it Take for My Body to Get Back to Normal?  Some changes will occur quickly. Others will not occur as quickly.  Your uterus, cervix, and vagina will all shrink to their nonpregnant size in about 2 weeks. Your vagina may be tender and dry for a few months--especially if you are breastfeeding.  If you had stitches or hemorrhoids, your   bottom   will be sore for 2 weeks or more.  For some women who have problems urinating, it can take several months for you to be able to hold your urine when you cough or sneeze or suddenly  something heavy.  Your breast milk will   come in   2 to 3 days after the birth of your baby. It will take 6 to 8 weeks for you and the baby to get the hang of breastfeeding and find a pattern. During these first weeks, you can have engorged breasts at times and often leak milk.  Your stomach and intestines all have to fall back into place. You may have a lot of gas for a few weeks.  You may be constipated--especially if you are breastfeeding.  Your stretched stomach muscles can recover in a few weeks, but for some women it takes longer--6 months or a year--to recover.  If you had a  delivery, you may have pain or numbness around the incision for 6 months or more.  Losing the weight you gained during pregnancy will probably take 6 months to a year. Have patience! It took 40 weeks to get here. Give yourself 40 weeks to get back.    What Can I Expect When My Hormones Change?  About 75% of all women will get the   blues.   This usually starts about 3 days after the birth  of your baby. You may cry easily and feel very, very tired. A few women become very depressed. If you had a  delivery or your new baby was sick, you are at a higher risk for depression.  Call your health care provider right away if you cannot care for yourself or your baby, if you feel very nervous or worried, if you cannot stop crying, or if you are having thoughts of hurting yourself or your baby.    Taking Care of Yourself  While you are still pregnant:  Talk with your partner and your family about the time ahead. Arrange for someone to help you during the first weeks at home if you can.  Talk with your health care provider about birth control options and make a plan before the baby comes.  If you are worried about how to parent a , take parenting classes. You will learn a lot about how babies act and you will make some friends who are going through the same thing at the same time. Most [facility] have these classes.  Arrange for someone to help with baby care if you can.  After the baby comes:  Ask for help. Let other people do the cooking and cleaning and run the house. Focus on yourself and your baby.  Sleep whenever you can. Try not to be tempted to   get some things done   when the baby sleeps. This is your time to sleep, too.  Drink lots of water. You will need at least 6 big glasses of water everyday to avoid constipation and make enough breast milk. Every time you sit down to breastfeed, have a big glass of water with you to drink while you are nursing.  Eat lots of vegetables and fruit. You will need lots of vitamins and fiber to help your body get back to normal. This will also help you avoid constipation.  Go outside and walk. Babies can go outside even if it is very cold. Fresh air and sunshine will do you both good.  Take sitz baths. Put about 6 inches of warm water in your bathtub and sit in there for 15 minutes 2 to 3 times a day. This will help your   bottom   heal more quickly. It  will also give you 15 minutes of private time!  Talk to other mothers. Join a new parents group. Call [place] and go to [facility] meetings if you are breastfeeding.     With your partner:  Keep talking. Share the experience.  Spend time alone. Even a 30-minute walk can be a date.  Start a birth control method. You can get pregnant before you even have a period. It is very important to use birth control if you do not want to get pregnant again right away.  When you have sex, use a lubricant. A lot of lubricant! Take it slow.  The first few months after a baby comes can be a lot like floating in a jar of honey--very sweet and naik, but very sticky, too. Take time to enjoy the good parts. Remind yourself that this time will pass. Bon voyage!    FOR MORE INFORMATION  For questions about depression during and after pregnancy:  http://www.womenshealth.gov/publications/our-publications/fact-sheet/depression-pregnancy.html   After birth: The first 6 weeks:  http://www.Money On Mobile/Post-Birth-and-Recovery   Breastfeeding resources:  http://www.SnapLayout.org/health-info/getting-breastfeeding-off-to-a-good-start/    Preparing for your baby:       Car Seat Clinics:  https://dps.[place].gov/divisions/ots/child-passenger-safety/Pages/car-seat-checks.aspx    [place] Safety [place]: http://www.[place]afetycouncil.org/family/carseatindex.cfm    Child Passenger Safety: Buckle Up Right    When child safety seats and safety belts are used correctly, they can reduce the risk of death by up to 70%. But finding the right combination can be confusing.  How do you know if you should be using an infant-only seat or a convertible seat?  What are booster seats and why do kids need them until they're eight years old or are four feet nine inches tall?  How do you know when a child is ready for your car's safety belt/shoulder strap?  The American Academy of Pediatrics (AAP) recommends that all infants and toddlers should ride  in a Rear-Facing Car Safety Seat until they are two (2) years of age or until they reach the highest weight or height allowed by their car safety seat's .    A child who is both under age 8 and shorter than 4 feet 9 inches is required to be fastened in a child safety seat that meets federal safety standards. Under this law, a child cannot use a seat belt alone until they are age 8, or 4 feet 9 inches tall. It is recommended to keep a child in a booster based on their height rather than their age. Check the instruction book or label of the child safety seat to be sure it is the right seat for your child's weight and height.    www.CarSeatsMadeSimple.org    Car Seat Recycling, -    Get free expert help in a [place] community near you:  [place] Child Passenger Safety Checkup Clinic Calendar    How to Find the Right Car Seat (NHTSA)  Safe Kids [place]    Print Materials  Basic Car Seat Safety checklist  Don't Skip a Step brochure (English); (Armenian); (Lao)  Good Going! Adventures in Safety magazine  Fact Sheets  Booster Seat Safety  Outdated and Used Child Safety Seats  A Parents' Checklist: Traffic Safety  Driving Your Child to School  Occupant Protection (Safety Belts and Child Safety Seats): Frequently Asked Questions; Misconceptions and Myths; [place] Laws  Air Bags: How Do Air Bags Work; Frequently Asked Questions      Immunizations:  http://www.cdc.gov/vaccines/schedules/easy-to-read/child.html    [place] Screening Program  Http://www.health.[facility].mn.us/newbornscreening/  [place] newborns are tested soon after birth for more than 50 hidden, rare disorders, including hearing loss and critical congenital heart disease (CCHD). This site provides resources and information for families and providers.    Ask your health care provider about vaccinations you may need following delivery. By now, you should have received a Tdap immunization to protect against pertussis or whooping cough.  Fathers and family members who will be in close contact with the baby should also receive a Tdap shot at least two weeks before the expected birth of the baby if they have not had a Td (tetanus) shot for at least two years.    Your midwife may offer to check your cervix for changes. If you are past your due date, discuss the next steps leading to delivery with your midwife. If you don't start labor on your own by 41 or 42 weeks, your midwife may recommend giving you medicines to ripen your cervix and start labor.  Induction of labor: http://onlinelibrary.donovan.com/store/10.1016/j.jmwh.2008.04.018/asset/j.jmwh.2008.04.018.pdf?v=1&t=lbwf6bqf&n=[email or web address]    Tell your midwife or physician how you plan to feed your baby (breast or bottle), who you have chosen to do pediatric care for your baby, and if you have a boy, whether you have chosen to have him circumcised. You will need a car seat correctly installed in your vehicle to bring your baby home. As you start to set up the nursery at home for your baby, make sure the crib is safe. The mattress needs to fit snugly against the edges of the crib. If you can fit a soda can between the bars, they are too far apart and can allow the baby's head to caught between them.    Learn about infant care and feeding, including information about infant CPR. We recommend that you put your baby to sleep on his or her back to reduce the chance of Sudden Infant Death Syndrome (SIDS). To maintain a healthy environment in which your child can grow, it's best to keep your home smoke-free. By preparing ahead, your transition into parenthood will go smoothly for you and your baby.    Your midwife will want to see you for a checkup 2 to 6 weeks after delivery.      Making Plans for Feeding My Baby    By this point, you probably have read a lot about feeding your baby.  Breastfeed or formula? Each mother s decision is her own and [facility] respects you and your choices. We ve gathered information on both breastfeeding and formula feeding to help with your decision. Talking with your physician or nurse-midwife can also help in your decision.  However you plan to feed your baby, [facility] encourage rooming in with your baby, skin-to-skin contact and feeding your baby based on his or her cues.    Skin-to-skin contact  Being close to mom helps your baby adjust to life outside of the womb.  It helps your baby regulate their body temperature, heart rate, and breathing.  Your baby will usually be placed skin-to-skin immediately following birth or as soon as possible, if medical intervention is needed.    Rooming-In  Having your baby stay with you in your room is called  rooming-in .  Keeping your baby in your room helps you to learn how to care for your baby by getting to know your baby s cues, body rhythms and sleep cycle.       Cue-based feeding  Cues (signals) are baby s way of telling you what he or she wants.  When you learn your infant s cues, you know how to care for and feed your baby.   Feeding cues are the licking and smacking of lips, bringing their fist to their mouth, and a reflex called  rooting - where baby turns and opens his or her mouth, searching for the breast or bottle.  Crying is a late feeding cue.  Babies can feed frequently, often at least 8 times in 24 hours.  Breastfeeding facts  Breast milk is the best source of nutrition for your baby and is available at birth. In the first couple of days, your milk volume is already starting to increase, though it may not be noticeable. Breastfeed frequently to increase your milk supply. Within three to five days, you will begin to notice larger milk volumes. An increase in breast size, heaviness and firmness are often described as the milk  coming in.  Frequent breastfeeding can help breasts from getting overly firm and painful. You will know the baby  is getting enough milk if your baby is having wet and dirty diapers and gaining weight.     If your goal is to exclusively breastfeed, it is important to not use any formula or artificial nipples (including bottles and pacifiers) while your baby is learning to breastfeed.  While it may seem like an  easy  option to give your baby a bottle, formula should only be given if there is a medical reason for your baby to have it.    Positioning and attachment   Get comfortable.  Use pillows as needed to support your arms and baby.  Hold baby close at the level of your breast, facing you in a tummy to tummy position.  Skin to skin helps with this.  Position the baby with his or her nose by the nipple.  There should be a straight line from baby s ear to shoulder to hips.  Tickle your baby s lips or wait for baby to open mouth wide, bring baby to breast by leading with the chin.  Aim the nipple at the roof of baby s mouth.  A rapid sucking pattern is followed by longer, drawing pattern with occasional swallows heard.  When baby is correctly latched, your nipple and much of the areola are pulled well into baby s mouth.      Returning to work or school  Focus on a good start to breastfeeding.  Many women continue to provide breastmilk for their baby when they return to work or school.  Making plans about where to pump and store milk can make the transition go well.  Talk with other mothers who have also returned to work or school for tips and support.  Your employer s Human Resource department may be a resource as well.     Returning to work or school: (continued)   babies can mean fewer  sick  days for you.  A quality breast pump will also save time and add comfort.  Check with your insurance prior to giving birth for breast pump coverage.  Many insurance companies include a pump within your benefits.  Wait until your baby is at least three weeks old to introduce a bottle for the first time.  Have someone besides you  give the bottle.  Breastfeed when you are with your baby. Reserve your bottles of breast milk for when you are away.   Your breasts will need to be  emptied  either by your baby or a pump.  Plan to pump at least twice in an eight hour day.  If you cannot pump at work, continue breastfeeding at home. Any amount of breast milk is worth giving to your baby.    Formula feeding facts  If you are planning to use formula to feed your baby, you will want to make some preparations ahead of time. Talk to your doctor or nurse-midwife about what type of formula to use. Some are iron-fortified, meaning they have extra iron in them. You will want to purchase formula and bottles before your baby is born to be sure you are ready after you return from the hospital. [facility] do not provide formula samples to take home.    Be sure to follow formula mixing directions closely. Regular milk in the dairy case at the grocery store should not be given to babies under 1 year old. Baby formula is sold in several forms including:  Ready-to-use. This is the most expensive, but no mixing is necessary.  Concentrated liquid. This is less expensive than ready-to-use and you mix with water.  Powder. This is the least expensive. You mix one level scoop of powdered formula with two ounces of water and stir well.    Most babies need 2.5 ounces of formula per pound of body weight each day. This means an 8-pound baby may drink about 20 ounces of formula a day; however, this is just an estimate. The most important thing is to pay attention to your baby s cues.  If your baby is always fussy, needs more iron or has certain food allergies, your physician may suggest you change your baby s formula to a different kind.     How do I warm my baby s bottles?  You may feed your baby a bottle without warming it first. It is OK for the breast milk or formula to be cool or room temperature. If your baby seems to prefer it warmed, you can put the  filled bottle in a container of warm water and let it stand for a few minutes. Check the temperature of the liquid on your skin before feeding it to your baby; to be sure it isn t too hot. Do not heat bottles in the microwave. Microwaves heat food and liquids unevenly, and this can cause hot spots that can burn your baby.    How do I clean and sterilize bottles?  Sterilize bottles and nipples before you use them for the first time. You can do this by putting them in boiling water for 5 minutes. After that first time, you can wash them in hot and soapy water. Rinse them carefully to be sure there is no soap left on them. You can also wash them in the .    Childbirth and Parenting Education:       Everyday Miracles:   https://www.everyday-miracles.org/    Free Video Series from [facility]: https://nursing.[facility]/academics/specialty-areas/nurse-midwifery/having-baby-prenatal-videos/having-baby-prenatal-and    Childbirth Education virtual (live) classes: www.coUrbanize/classes  The Birth Hour: https://FTBpro/online-childbirth-class/  BirthED: https://www.birthedmn.com/  [facility] parenting center: http://[facility] OaklandinMarket/   (799) 011-BABY  [place]: (education, yoga & wellness) www.Level Chef  Enlightened Mama: www.enlightenedmama.[place]   Childbirth collective: (Parent topic nights)  www.childbirthcollective.org/  Hypnobabies:  www.hypnobabiestwincities.com/  Hypnobirthing:  Http://hypnobirthing.[place]/  Hypnobirthing virtual class: www.Zumobi/hypnobirthing    Information about doulas:  Childbirth collective: http://www.childbirthcollective.org/  Doulas of [place] (YOBANY):  www.yobany.org  [facility]  project: http://Breakout Studiostiesdoulaproject.com/     Early Childhood and Family Education (ECFE):  ECFE offers parents hands-on learning experiences that will nourish a lifetime of teachable moments.  http://ecfe.info/ecfe-home/    APPS and Podcasts:   Ovia  Glow  "Nurture    Evidence Based Birth  The Birth Hour (for birth stories)   Birthful   Expectful   The Longest Shortest Time  PregnancyPodcast Yasmin Richanguyen    Book Recommendations:   Jen Brianna's Birthing From Within--first few chapters include a new-age tone, you may prefer to skip it and keep going, because there is good stuff later.  This book recommendation covers emotional preparation, but does cover coping with pain, and use of both pharmacological and nonpharmacological methods.      Guide to Childbirth by Deneen Grant  Childbirth Without Fear by Judi Aranda Read    Dr. Browning' The Pregnancy Book and The Birth Book--the pregnancy book goes month-by month    The Birth Partner by Marie Cason    Womanly Art of Breastfeeding by La Leche League International   Bestfeeding by Josette Manuel--great pictures    Mothering Your Nursing Toddler, by Rachana Moe.   Addresses dealing with so many of the challenging behaviors of a nursing toddler.  How Weaning Happens, by La Leche League.  Discusses weaning at all ages, from medically necessary weaning of an infant, all the way up to age 5 (or older), with why/why not, and strategies.  Very empowering book both for deciding to wean and deciding not to.    American College of Nurse-Midwives (ACNM) http://www.midwife.org/; look at the informational handouts at http://www.midwife.org/Share-With-Women     www.mymidwife.org    Mother to Baby (Medication and Herbal guidance in pregnancy): http://www.mothertobaby.org  Toll-Free Hotline: 419.560.9053  LactMed (Medication use while breastfeeding): http://toxnet.nlm.nih.gov/newtoxnet/lactmed.htm    Women's Health.gov:  http://www.womenshealth.gov/a-z-topics/index.html    American pregnancy association - http://americanpregnancy.org    Centering Pregnancy (group prenatal care option): http://centeringhealthcare.org    March of Dimes www.enMarkit - Nutrition  www.mypyramid.gov  Under \"For Consumers,\" click on " "\"pregnant and breastfeeding women.\"      Centers for Disease Control and Prevention (CDC) - Vaccines : http://www.cdc.gov/vaccines/       When researching information on the web, question the validity of websites.  The G.I. Windows .gov, .edu and.org tend to be more reliable information.  If there are a lot of advertisements, be cautious of the information provided. Stay away from blogs and chat rooms please!     [facility] Breastfeeding Support    Early Childhood Family [place] [address] provides a free, drop-in class/breastfeeding support group, facilitated by a lactation consultant and .  During the group you can connect with other parents, weigh your baby, ask questions about feeding and baby development, and more.  You do not need to register.  Fall in-person meetings will begin on , are for parents of babies from birth to 9 months, and will meet on Monday evenings from 6 - 7:15 pm in [facility], which is at [address] [place] [address].  [place] [address] also offers virtual group meetings with a lactation consultant/.  These take place on , from 11:30 am - 12:30 pm for parents of newborns to 3-month-olds, and from 4:15 - 5:15 for parents of 3 - 9 - month olds.  To get the meeting link contact Cata Ortega at [phone].    [facility] offers a free, drop-in breastfeeding support group facilitated by MEG Scott.   at [place] [facility] [address], [place].  A scale is available to check baby weights, if desired.  [place] also has a variety of new parent classes:  (cost for registration)  https://[facility] HospitalAlti Semiconductor.Alt12 Apps/classes/    [facility] Lactation unge facilitated by MEG Darden:  Free, drop-in support group for breastfeeding, with baby scale available if needed.  Meets at [facility], second Tuesday of each month, 10 am - 12 pm.  Text [phone] for info.    [facility] Support " "Group, Tuesdays at 10:30 am   Run by MEG Sparrow of [employer] [facility]act[facility]   Go to [facility] Facebook page, click on \"events\" for link:   Https://www.Amnis.com/events/067163258978771/    [facility] [place] breastfeeding support community:  free, drop-in breastfeeding support facilitated by Certified Lactation Counselors, open Mondays and Wednesdays from 1 pm - 5 pm.  In [place]:  [facility] [place], [address] [facility]:   gu[place].org    [facility] Milk Hour, Thursdays at 2:30 pm    Run by Masoud Martel, MEG  Go to [facility] FB page and send message to get link   Https://www.Amnis.Gymtrack/Swayfoundations/    Mejia Sibley:  http://www.Eykona Technologieslondas.org/    [facility] offers a Lactation Lounge every Friday 12pm - 1pm, run by Mejia Alves Leader.  Sign up via link at GenieBelt/cbe-lactation   https://www.GenieBelt/cbe-lactation    [facility] is offering virtual support groups every Monday, 10:30 am - 12 pm, run by RN IBCLC: Https://www.Amnis.com/events/[email or web address]/    Culturally-Specific Breastfeeding Support:     For Hmong Families:   The Hmong Breastfeeding Coalition is a wonderful support for [place] Hmong women who are breastfeeding.  They are best found on Facebook.    for Black families:    Chocolate Milk Club:  http://www.Engine EcologyselsmidwiSTAT-Diagnostica.Gymtrack/chocolate-milk-club/  Email: [email or web address]@[email or web address]    R.O.S.E. = Reaching our Sisters Everywhere  Http://www.breastfeedingrose.org/    Club Mom breastfeeding support for Black mothers:  Contact Dennis Winn  Phone: [phone]   Email:  [email or web address].     Christi Desai  Phone: [phone]   Email:  [email or web address].    Club Dad parent support for Black fathers:   Contact Logan Palomo   Phone: [phone]   Email:  " "[email or web address].    For Native/Indigenous Families:    https://www.[employer]en [employer].com/groups/nitawilda.gimashkikinaan     Additional Resources:  La Leche YouStickerjazmine is an international, nonprofit, nonsectarian organization offering information, education, and support to mothers who want to breast-feed their babies. Local groups offer phone help and monthly meetings. Visit Value Payment Systems.org or TensorComm.org and us the  Find local support  drop down menu or click on the  Resources  tab.    [place] Breastfeeding Resources: 9-151-067-BABY (2229) toll free    National Breastfeeding Help Line trained breastfeeding peer counselors can help answer common breast-feeding questions by phone. Monday-Friday: English/Turkmen  1-987- 630-3402 toll free, 1-235.610.9355 (TTY)    Virtual Breastfeeding Support:    During this time of isolation, breastfeeding families need even more community!  Here are some area organizations offering virtual support groups for breastfeeding:    [facility] Cafe Support Group, Tuesdays at 10:30 am   Run by MEG Sparrow of [facility]   Go to [facility] Facebook page and click on \"events\" for link   https://www.[employer].com/events/743391552292387/  [facility] Milk Hour, Thursdays at 2:30 pm    Run by MEG Muro   Go to [facility] FB page and send message to get link   https://www.[employer].com/healthfoundations/  [facility] holding virtual meetings the first Tuesday of each month, 8-9 pm, and the   Third Saturday, 10 - 11 am.  Go to [facility] FB page; message to get link https://www.[employer].com/Katherine/?hc_location=[place]  [facility] offers a Lactation Lounge every Friday 12pm - 1pm, run by Mckayla AlvesThe University of Texas Medical Branch [facility] Campusjazmine Leader   Sign up via link at https://www.NeoMed Inc/cbe-lactation  [place] " [facility] is offering virtual support groups every Monday, 10:30 am - 12 pm, run by nurse MEG   Https://www.facebook.com/events/229249642884899/    Prenatal Breastfeeding Classes:      [employer] is offering virtual breastfeeding and  care classes:  https://www.Blast Ramp/education-workshops  BirthEd childbirth and breastfeeding education offering virtual prenatal breastfeeding classes  https://www.birthedmn.com/workshops   You have been provided the Any Day Now: Early Labor at Home document.    Additional copies can be found here:  www.Innovative Cardiovascular Solutions/939410.pdf  You have been provided the What I'd Wish I'd Known About Giving Birth document.    Additional copies can be found here:  www.[employer]/234942.pdf

## 2023-08-07 NOTE — PROGRESS NOTES
Depression Response    Patient completed the PHQ-9 assessment for depression and scored >9? Yes  Question 9 on the PHQ-9 was positive for suicidality? Yes  Does patient have current mental health provider? Yes    Is this a virtual visit? No    I personally notified the following: visit provider         Answers submitted by the patient for this visit:  Patient Health Questionnaire (Submitted on 8/7/2023)  If you checked off any problems, how difficult have these problems made it for you to do your work, take care of things at home, or get along with other people?: Very difficult  PHQ9 TOTAL SCORE: 12

## 2023-08-08 ENCOUNTER — TELEPHONE (OUTPATIENT)
Dept: PSYCHIATRY | Facility: CLINIC | Age: 23
End: 2023-08-08
Payer: COMMERCIAL

## 2023-08-08 LAB — GP B STREP DNA SPEC QL NAA+PROBE: NEGATIVE

## 2023-08-08 NOTE — TELEPHONE ENCOUNTER
BLANCA received request to contact Kevin as part of Women's Well-being Specialty program. She now has apartment, but may want to discuss resources for transportation or .    SW called Kevin. She states the baby's father is able to pay for their apartment so she is not currently worried about this. She does want help with getting a breast pump and other possible supplies.     SW will my chart information on resources available to her through her insurance, including breast pumps and car seats.    Writer will follow up in 1 week to check in.    THERESA Torres, [facility]  [phone]

## 2023-08-11 ENCOUNTER — TELEPHONE (OUTPATIENT)
Dept: PSYCHIATRY | Facility: CLINIC | Age: 23
End: 2023-08-11
Payer: COMMERCIAL

## 2023-08-11 NOTE — TELEPHONE ENCOUNTER
This writer called Kevin and left a message to follow-up on previous plan/recent referrals placed. Confirmed Kevin has an intake with Mother Baby program scheduled for 8/30, and offered to meet for weekly therapy as needed/desired for bridging support between now and intake. Confirmed referral to home visiting programs was placed, and asked Kevin to let me know if any questions or concerns related to connecting with referral follow-up. Left my contact number and encouraged Kevin to contact me directly to schedule appointment or call with questions/concerns.     Luisa Flores, PhD  Postdoctoral Clinical Psychology Fellow  Women's Wellbeing Program   Supervising Psychologist: Lori Romero PsyD,   [phone]

## 2023-08-13 NOTE — PROGRESS NOTES
Here with Rakan for a routine prenatal visit at 37w1d. Reviewed GBS negative result and hgb level of 12.1 from last visit. Offers no questions or concerns.  Started at the mother-baby center through [facility] today.  They called and stated that they had an immediate opening and she desired to start the program.  She enjoyed attending today and plans to return tomorrow.  The program is Monday through Thursday.  Denies any changes with her mental health.  Continues to deny a plan for suicide though reports some passive suicidal ideation. Reports regular fetal movements.  Denies regular painful contractions, bleeding, leaking.  Decided she would like to have home birth.  Meeting with two midwives tomorrow.  Requesting transfer of care and requesting printed prenatal records today.  JAZZ signed and records released to patient as requested. Reviewed term labor precautions, warning signs and when/how to call the on-call CNM if she has needs prior to establishing care with home birth practice.

## 2023-08-14 ENCOUNTER — PRENATAL OFFICE VISIT (OUTPATIENT)
Dept: MIDWIFE SERVICES | Facility: CLINIC | Age: 23
End: 2023-08-14
Payer: COMMERCIAL

## 2023-08-14 VITALS — WEIGHT: 130 LBS | SYSTOLIC BLOOD PRESSURE: 122 MMHG | BODY MASS INDEX: 24.56 KG/M2 | DIASTOLIC BLOOD PRESSURE: 68 MMHG

## 2023-08-14 DIAGNOSIS — O26.843 UTERINE SIZE-DATE DISCREPANCY IN THIRD TRIMESTER: ICD-10-CM

## 2023-08-14 DIAGNOSIS — O09.93 PREGNANCY, SUPERVISION, HIGH-RISK, THIRD TRIMESTER: Primary | ICD-10-CM

## 2023-08-14 PROBLEM — F41.1 GENERALIZED ANXIETY DISORDER: Status: ACTIVE | Noted: 2023-08-14

## 2023-08-14 PROBLEM — F39 EPISODIC MOOD DISORDER (H): Status: ACTIVE | Noted: 2022-09-13

## 2023-08-14 PROCEDURE — 99207 PR PRENATAL VISIT: CPT | Performed by: ADVANCED PRACTICE MIDWIFE

## 2023-08-14 PROCEDURE — 59425 ANTEPARTUM CARE ONLY: CPT | Performed by: ADVANCED PRACTICE MIDWIFE

## 2023-08-14 ASSESSMENT — PATIENT HEALTH QUESTIONNAIRE - PHQ9
10. IF YOU CHECKED OFF ANY PROBLEMS, HOW DIFFICULT HAVE THESE PROBLEMS MADE IT FOR YOU TO DO YOUR WORK, TAKE CARE OF THINGS AT HOME, OR GET ALONG WITH OTHER PEOPLE: VERY DIFFICULT
SUM OF ALL RESPONSES TO PHQ QUESTIONS 1-9: 9
SUM OF ALL RESPONSES TO PHQ QUESTIONS 1-9: 9

## 2023-08-17 ENCOUNTER — TELEPHONE (OUTPATIENT)
Dept: PSYCHIATRY | Facility: CLINIC | Age: 23
End: 2023-08-17
Payer: COMMERCIAL

## 2023-08-17 NOTE — TELEPHONE ENCOUNTER
"This writer reached out to [facility] to check in. Kevin sounded bright, upbeat, and reported her mood has been \"much better\" since moving. She has started the [facility] day hospital program and has a positive experience and feels it has been helpful. She has had difficulty attending this week due to transportation challenges - her fiance needs their car to get to work. She is hopeful the MB program can help her organize a med ride. Provided number for MB program and at [facility]'s request/with her permission agreed to pass along the message to make sure the connection was made. Spoke with Beverly Nolasco ([phone]) to pass along message, who confirmed they have [facility]'s correct contact information and will reach out to [facility] again.    Luisa Flores, PhD  Postdoctoral Clinical Psychology Fellow  Women's Wellbeing Program  Supervising Psychologist: Lori Romero PsyD,   [phone]   "

## 2023-08-28 ENCOUNTER — TELEPHONE (OUTPATIENT)
Dept: PSYCHIATRY | Facility: CLINIC | Age: 23
End: 2023-08-28

## 2023-08-28 NOTE — TELEPHONE ENCOUNTER
SW placed call to check in on Kevin's well-being and resources for prenatal/baby needs.    Kevin reports that she gave birth on 8/26/23. She and baby are doing well, his name is Cyrus. She reports the birth was super smooth, denies concerns at this time and that she has been spending most of her time with the baby.    Writer reviewed that she has an appointment with Dr. Coreas on 8/31. She will try to make it. Writer briefly discussed how hormone fluctuations can impact mood and mental health and encouraged her to attend.    Writer will follow up in 1-2 weeks.    THERESA Torres, [facility]  [phone]

## 2023-08-29 NOTE — TELEPHONE ENCOUNTER
Thank you all for contacting Kevin to reschedule! I tried calling her after team last week but wasn't able to reach her - I will try again this week.     Thanks,  Luisa

## 2023-09-11 ENCOUNTER — TELEPHONE (OUTPATIENT)
Dept: PSYCHIATRY | Facility: CLINIC | Age: 23
End: 2023-09-11
Payer: COMMERCIAL

## 2023-09-11 ENCOUNTER — CARE COORDINATION (OUTPATIENT)
Dept: PSYCHIATRY | Facility: CLINIC | Age: 23
End: 2023-09-11
Payer: COMMERCIAL

## 2023-09-11 NOTE — PROGRESS NOTES
This writer followed up to confirm referall to [facility] Family Home Visiting program. Confirmed referral placed and Kevin assigned public nurse Cristian Amaya (incorrect spelling) - [phone]. Documentation shows nurse has attempted to reach Kevin via call and text but has not yet been successful in connecting. Referral had  after 30 days of no contact. This writer reinitiated referral and left message with Cristian Amaya to share Kevin's partner's contact information as an alternative way to reach her for purposes of referral.

## 2023-09-11 NOTE — TELEPHONE ENCOUNTER
This writer attempted to contact Kevin as part of Women's Wellbeing Team follow-up effort following her recent home delivery, to congratulate Kevin, assess for any concerns, and offer support/opportunity to schedule outpatient therapy appointment as she is no longer engaged in treatment through Mother Baby program. This is the third attempt by this writer in the last week. All calls have been forwarded to voicemail and voicemail has been full so I have been unable to leave a message so far. I will send Kevin a SONIC BLUE AEROSPACEt message letting her know of my attempts to connect and encouraging her to contact me with any needs.     Luisa Flores, PhD  Postdoctoral Clinical Psychology Fellow  Women's Wellbeing Program  Dept of Psychiatry and Behavioral Sciences  Supervising Psychologist: Lori Romero PsyD, LP  Office phone: [phone]

## 2023-09-13 ENCOUNTER — TELEPHONE (OUTPATIENT)
Dept: PSYCHIATRY | Facility: CLINIC | Age: 23
End: 2023-09-13
Payer: COMMERCIAL

## 2023-09-13 NOTE — TELEPHONE ENCOUNTER
SW made additional attempts to contact [facility] today.    First call: phone answers, no one spoke then call ended.    Second call: mailbox full and cannot accept messages.      THERESA Torres, [facility]  [phone]

## 2023-09-19 NOTE — TELEPHONE ENCOUNTER
SW placed call to Kevin to attempt to check in on well-being.    Kevin reports both she and baby are doing well. She was experiencing some increased depression and reached out to mother baby. She has been there the past two days. Writer reinforced her decision to go and the support she will receive.    She is not currently in need of other supports and is okay with writer checking in again in 1-2 weeks.    THERESA Torres, [facility]  [phone]

## 2023-09-25 ENCOUNTER — DOCUMENTATION ONLY (OUTPATIENT)
Dept: PEDIATRICS | Facility: CLINIC | Age: 23
End: 2023-09-25
Payer: COMMERCIAL

## 2023-09-25 NOTE — PROGRESS NOTES
"Kevin was seen in pediatric primary care clinic today with her 4-week old baby for a wellness visit. Kevin's EPDS score was elevated. She also answered \"Hardly ever\" for question 10 regarding self harm. She does not have active thoughts of self harm at this time. She also has daily therapy through Mother Baby program. She reports no thoughts of self harm at this time and reports that she has good support at home.     Will route this encounter to her psychiatrist, and CNM as an FYI for further recommendations.    Tulio Noyola, APRN, CPNP, IBCLC  [facility]  [facility]  9/25/2023, 4:55 PM    "

## 2023-10-03 ENCOUNTER — TELEPHONE (OUTPATIENT)
Dept: PSYCHIATRY | Facility: CLINIC | Age: 23
End: 2023-10-03
Payer: COMMERCIAL

## 2023-10-03 NOTE — TELEPHONE ENCOUNTER
SW placed call to check in with Kevin. She confirmed she is getting needs met through Mother [facility] and does not have any current resource needs. She will either call writer or let Luisa Flores know if she needs additional social work resource supports.    THERESA Torres, [facility]  [phone]

## 2023-10-20 ENCOUNTER — TELEPHONE (OUTPATIENT)
Dept: PSYCHIATRY | Facility: CLINIC | Age: 23
End: 2023-10-20
Payer: COMMERCIAL

## 2023-10-20 NOTE — TELEPHONE ENCOUNTER
"This writer spoke with Kevin to reconnect following her completion of [facility]'s partial hospitalization program and referral from Mother Baby therapist Beverly Nolasco, [facility], for outpatient psychotherapy. Kevin confirmed desire to initiate outpatient services with this writer. Reported mood and wellbeing are \"good.\" She notes she feels she has been able to maintain gains achieved in Mother Baby since discharge. She is looking forward to celebrating her birthday next Monday with an overnight trip to [facility] and has made preparations for Kervin Bridges's care while she is away. Scheduled initial appointment for 10/27 at 11 am. Provided direct contact information and encouraged Kevin to contact this writer if she needs to reschedule or has any trouble logging into virtual appointment.     Luisa Flores, PhD, LP  Postdoctoral Clinical Psychology Fellow  Women's Wellbeing Program  Supervising Psychologist: Lori Romero PsyD, LP  [phone]    "

## 2023-10-27 ENCOUNTER — TELEPHONE (OUTPATIENT)
Dept: PSYCHIATRY | Facility: CLINIC | Age: 23
End: 2023-10-27
Payer: COMMERCIAL

## 2023-10-27 NOTE — TELEPHONE ENCOUNTER
This writer left a vm for Kevin to check if she is able to attend our appointment for today at 11. Offered several openings for next week, and asked Kevin to call me back to confirm rescheduling plans. Provided direct contact information.      Luisa Flores, PhD, LP  Postdoctoral Clinical Psychology Fellow  Women's Wellbeing Program  Supervising Psychologist: Lori Romero PsyD, ANEESH  [phone]
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
## 2023-11-01 ENCOUNTER — TELEPHONE (OUTPATIENT)
Dept: PSYCHIATRY | Facility: CLINIC | Age: 23
End: 2023-11-01
Payer: COMMERCIAL
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
## 2023-11-02 ENCOUNTER — TELEPHONE (OUTPATIENT)
Dept: PSYCHIATRY | Facility: CLINIC | Age: 23
End: 2023-11-02
Payer: COMMERCIAL

## 2023-11-02 NOTE — TELEPHONE ENCOUNTER
This writer left a vm for Kevin to check if she is able to attend our appointment for today at 4 pm. Provided direct contact information and encouraged Kevin to call me back to reschedule if needed.       Luisa Flores, PhD, LP  Postdoctoral Clinical Psychology Fellow  Women's Wellbeing Program  Supervising Psychologist: Lori Romero PsyD, LP  [phone]

## 2023-11-03 ENCOUNTER — VIRTUAL VISIT (OUTPATIENT)
Dept: PSYCHIATRY | Facility: CLINIC | Age: 23
End: 2023-11-03
Attending: STUDENT IN AN ORGANIZED HEALTH CARE EDUCATION/TRAINING PROGRAM
Payer: COMMERCIAL

## 2023-11-03 DIAGNOSIS — F33.1 MODERATE EPISODE OF RECURRENT MAJOR DEPRESSIVE DISORDER (H): Primary | ICD-10-CM

## 2023-11-03 DIAGNOSIS — F41.1 GENERALIZED ANXIETY DISORDER: ICD-10-CM

## 2023-11-03 PROCEDURE — 90792 PSYCH DIAG EVAL W/MED SRVCS: CPT | Mod: VID | Performed by: STUDENT IN AN ORGANIZED HEALTH CARE EDUCATION/TRAINING PROGRAM

## 2023-11-03 ASSESSMENT — ANXIETY QUESTIONNAIRES
3. WORRYING TOO MUCH ABOUT DIFFERENT THINGS: NEARLY EVERY DAY
GAD7 TOTAL SCORE: 15
2. NOT BEING ABLE TO STOP OR CONTROL WORRYING: NEARLY EVERY DAY
7. FEELING AFRAID AS IF SOMETHING AWFUL MIGHT HAPPEN: NOT AT ALL
1. FEELING NERVOUS, ANXIOUS, OR ON EDGE: NOT AT ALL
4. TROUBLE RELAXING: NEARLY EVERY DAY
6. BECOMING EASILY ANNOYED OR IRRITABLE: NEARLY EVERY DAY
IF YOU CHECKED OFF ANY PROBLEMS ON THIS QUESTIONNAIRE, HOW DIFFICULT HAVE THESE PROBLEMS MADE IT FOR YOU TO DO YOUR WORK, TAKE CARE OF THINGS AT HOME, OR GET ALONG WITH OTHER PEOPLE: VERY DIFFICULT
5. BEING SO RESTLESS THAT IT IS HARD TO SIT STILL: NEARLY EVERY DAY
GAD7 TOTAL SCORE: 15

## 2023-11-03 ASSESSMENT — PATIENT HEALTH QUESTIONNAIRE - PHQ9
SUM OF ALL RESPONSES TO PHQ QUESTIONS 1-9: 3
SUM OF ALL RESPONSES TO PHQ QUESTIONS 1-9: 3
10. IF YOU CHECKED OFF ANY PROBLEMS, HOW DIFFICULT HAVE THESE PROBLEMS MADE IT FOR YOU TO DO YOUR WORK, TAKE CARE OF THINGS AT HOME, OR GET ALONG WITH OTHER PEOPLE: SOMEWHAT DIFFICULT

## 2023-11-03 ASSESSMENT — PAIN SCALES - GENERAL: PAINLEVEL: MODERATE PAIN (4)

## 2023-11-03 NOTE — NURSING NOTE
Is the patient currently in the state of [place]? YES    Visit mode:VIDEO    If the visit is dropped, the patient can be reconnected by: VIDEO VISIT: Text to cell phone:   Telephone Information:   Mobile [phone]       Will anyone else be joining the visit? NO  (If patient encounters technical issues they should call [phone]    How would you like to obtain your AVS? MyChart    Are changes needed to the allergy or medication list? No    Reason for visit: No chief complaint on file.    Sandie DONALDSON

## 2023-11-03 NOTE — PATIENT INSTRUCTIONS
Thank you for coming to the [facility] Women[facility]being Program.     Treatment Plan    Medications:  Continue sertraline 50mg daily    Therapy: Continue with Dr Flores    RTC: In 2 weeks      Lab Testing:  If you had lab testing today and your results are reassuring or normal they will be mailed to you or sent through [employer] within 7 days. If the lab tests need quick action we will call you with the results. The phone number we will call with results is # [phone]. If this is not the best number please call our clinic and change the number.     Medication Refills:  If you need any refills please call your pharmacy and they will contact us. Our fax number for refills is [phone].   Three business days of notice are needed for general medication refill requests.   Five business days of notice are needed for controlled substance refill requests.   If you need to change to a different pharmacy, please contact the new pharmacy directly. The new pharmacy will help you get your medications transferred.     Contact Us:  Please call [phone] during business hours (8-5:00 M-F).   If you have medication related questions after clinic hours, or on the weekend, please call [phone].     Financial Assistance [phone]   Medical Records [phone]       **For crisis resources, please see the information at the end of this document**     To find additional local resources, refer to Postpartum Support International (PSI). Available at: http://www.postpartum.net/get-help/locations/united-states/  -[facility] for Women's Mental Health at: www.womensmentalhealth.org is a good resource for information about psychiatric medication in pregnancy/lactation  -Mother To Baby A service of the nonprofit Organization of [employer]at[employer] Information [employer] (ALEXANDRA), provides evidence based information online and in printable handouts to provide patients and providers regarding medications and other exposures during  "pregnancy and lactation Available at: https://mothertobaby.org/fact-sheets-parent/.  -The 4th Trimester Project - Expert written resources and information for mothers and families. Available at: https://ClaimIt/  -Consider using \"The Pregnancy and Postpartum Anxiety Workbook,\" by Maile Maloney PhD and Beka East PsyD.    Postpartum Planning Tools:  Maternal Wellbeing Plan from [facility]: https://www.health.[facility].[place]/people/womeninfants/pmad/pmadsfs.html    4th Trimester Postpartum plan: https://ClaimIt/mypostpartumplan    Tips for partners:  http://www.postpartum.net/family/tips-for-postpartum-dads-and-partners/        MENTAL HEALTH CRISIS RESOURCES:  For a emergency help, please call 911 or go to the nearest Emergency Department.     Emergency Walk-In Options:   EmPATH Unit @ [facility]): [phone] - Specialized mental health emergency area designed to be calming  [facility] ([place]): [phone]  [facility] ([place]): [phone]  [facility]): [phone]    [facility] Crisis Information:   [place]: [phone]  [place]: [phone]  [place] ([place]) - Adult: [phone]     Child: [phone]  [place] - Adult: [phone]     Child: [phone]  [place]: [phone]  List of all [facility] resources:   https://[place].gov/dhs/people-we-serve/adults/health-care/mental-health/resources/crisis-contacts.jsp    National Crisis Information:   Crisis Text Line: Text  [place]  to 218913  Suicide & Crisis Lifeline: 988  National Suicide Prevention Lifeline: 8-750-434-TALK (1-490.249.3390)       For online chat options, visit https://suicidepreventionlifeline.org/chat/  Poison Control Center: 1-577.768.2090  Trans Lifeline: 1-229.234.3667 - Hotline for transgender people of all ages  The [place] Project: 9-230-439-4751 - Hotline for LGBT youth     For Non-Emergency Support:   Fast Tracker: Mental Health & Substance Use " Disorder Resources -   https://www.fasttrackermn.org/

## 2023-11-10 ENCOUNTER — TELEPHONE (OUTPATIENT)
Dept: PSYCHIATRY | Facility: CLINIC | Age: 23
End: 2023-11-10
Payer: COMMERCIAL

## 2023-11-10 NOTE — TELEPHONE ENCOUNTER
This writer left a vm for Kevin to check if and let her know I've been thinking of her. Offered options for meeting next week, provided direct contact information, and encouraged Kevin to call me directly to check in or schedule an appointment.       Luisa Flores, PhD, LP  Postdoctoral Clinical Psychology Fellow  Women's Wellbeing Program  Supervising Psychologist: Lori Romero PsyD, ANEESH  [phone]

## 2023-11-13 NOTE — PROGRESS NOTES
Virtual Visit Details    Type of service:  Video Visit   Video Start Time:  4:05PM  Video End Time: 4:12PM    Originating Location (pt. Location): Other Car    Distant Location (provider location):  Off-site  Platform used for Video Visit: [facility]  MEDICAL PROGRESS NOTE     Kevin Urena is a  23 year old currently 12 weeks postpartum,, mother of Cyrus(born 23) referred by Luisa Flores for evaluation of pregnancy related mental health issue and follow up care.    Partner/Support: Myles (Rakan)  Feeding Method: Breastfeeding    Care Team  Therapist: Luisa Flores  PCP: None  OB-GYN: Lorie Treadwell (Midwife)       Diagnoses     Major depressive disorder, recurrent, moderate  Attention deficit hyperactivity disorder, unspecified  Generalized anxiety disorder     Assessment     Kevin Urena is a  23 year old brave, motivated, and resilient female,  at 12 weeks postpartum with past psych hx significant for MDD, ADHD  who presents today for a follow up.    Today, Kevin reports that she's doing well with no concerns. Did not report SI, HI or acute safety concerns. She requested for a shorter visit today because she was in the car with her mother and her son, Cyrus needed to be cared for during the appointment. We will plan to have a close appointment. No medication changes will be made today.    Safety Risk-Kevin did not appear to be an imminent safety risk to self or others.    Psychotropic Drug Interactions:  [PSYCHCLINICDDI]  none  Management: use lowest therapeutic doses of setraline    [place]NPMP was checked today: not using controlled substances       Plan     1) Medications:   - Continue sertraline 50mg daily    2) Psychotherapy: Continue with Dr. Flores    3) Next due:  Labs- Routine monitoring is not indicated for current psychotropic medication regimen   EKG- Routine monitoring is not  "indicated for current psychotropic medication regimen   Rating scales-  GUCCI-7    4) Referrals: none    5) Other: none    6) Follow-up: Return to clinic in 2 weeks       Pertinent Background                                                   [most recent eval 11/3/23]     Kevin first experienced depressive symptoms in childhood. She endorsed first experiencing symptoms of depressed mood and chronic suicidal ideation in 4th grade. Kevin believes that around that time she was referred to therapy and first diagnosed with depression. She noted experiencing a distrust of therapists and therapy after disclosing suicidal ideation to her first therapist. The therapist shared Kevin's report with her mother, who responded by telling Kevin, \"just [kill yourself] so I can get over it.\" Kevin noted, \"my mom warned me, 'don't ever say anything like that ever again', and so I didn't. Kevin never talked to the therapist because she felt she couldn't trust her. She next received psychiatric treatment in 7th grade when she engaged in self-harm (cutting). She completed a two week intensive program at [facility]. Kevin denied any current concerns or any other history of self-harm. In August 2022 Kevin was hospitalized following a suicide attempt-steeped in front of a bus. She noted she had been kicked out of her mother's home around June 2022 and engaged in significant alcohol abuse during July and August of 2022 while living in shelters and coping with homelessness. On the day Kevin attempted to kill herself [place] and had been engaged in drinking and later smoking what she believed was marijuana with a group of teens she met. During her ER admission, her speech was noted to be rapid and pressured, with labile mood. She was discharged the next morning when she felt better with no suicidal ideation. She was referred for therapy but declined to follow up.    Please see DA by " Lori Romero PsyD & Luisa Flores,PhD on 2023.   .    Pertinent items include:  adverse childhood experiences, suicide attempt , trauma hx, and substance use: alcohol and cannabis       Interim History     Since last visit: Kevin reports that she's doing well and because she needed to attend to her son requested for the visit to be short with a close appt rescheduled. She was in a moving vehicle with her mother driving during the appointment. Her son,Cyrus started to cry during the appointment and Kevin wasn't able to stay on the appointment for long because she need to care for her child.    Sleep: Not assessed today.    Structure: Reports feeling adequately supported.    Bonding/Attachment/Parenting: She enjoys being Cyrus mother.    Child development/Milestones: No concerns with Cyrus's development.        Recent Substance Use  Alcohol-  reports that she drinks(vodka, fireball, etc) about twice a week. Takes about 3-4 drinks/sitting, Cannabis: smokes daily,  . She reports that it helps her feel less anxious and irritable and also to remember to eat.    Reproductive Plans: Interested in getting a contraceptive. Plans to reach out to her midwife.        Important Summary Points & Treatment Events   11/3/23: Established care. No medication changes made.  11/15/23: No medication changes. A short check-in visit with plans for a close follow up.     Mood & Anxiety Disorder (PMAD) History   Hx of  mood or anxiety disorder: Worsening mood during pregnancy  Hx of  psychosis: None  Hx premenstrual mood/anxiety problems: none  Hx mood symptoms while taking hormonal birth control: Marked irritability during nexplanon  Hx of infertility: None  Hx of traumatic birth: Not assessed today  Family Hx of PMADs: Unknown       Pregnancy/OBGYN History     # 1 - Date: 05/15/22, Sex: None, Weight: None, GA: 10w1d, Delivery: None, Apgar1: None, Apgar5: None, Living: None, Birth Comments:  pills, no comp    # 2 - Date: None, Sex: None, Weight: None, GA: None, Delivery: None, Apgar1: None, Apgar5: None, Living: None, Birth Comments: None      Updated Social History               [per patient report]                                      1ea,1ea   Financial support-  Well supported by her finace          Children- Has a son-Cyrus         Living situation-  lives in [place] with her fiance (Rakan), his 10-year-old daughter.  .      Legal-  legal proceedings over the past 1.5 years related to an accusation of vandalism/burglary. Kevin reported although her ex is responsible for these events, she feels she has been identified as the court's scapegoat because he hasn't shown up to any court proceedings. Kevin's next court date will involve a pretrial in November. Kevin is working with a free  who was assigned to her by the [facility]. She reported feeling satisfied/well represented by her current       Social support- Her finace is a big source of support and her mother She doesn't fully trust her mother but is starting to trust her.         Cultural influences/Impact- Reports that her mother was churchgoing but she isn't a Orthodox-goer. She feels like she might want to start going.         Feels safe at home- Yes      Psychiatric Medication Trials                                                           Drug /  Start Date Dose (mg) Helpful Taken during a  period? Adverse Effects   DC Reason / Date   Sertraline/ Used in the past and restarted in  50    Unhelpful for mood in 2022   Adderall/        Quetiapine/2009        Hydroxyzine                    Medical / Surgical History                                                                                                                     Patient Active Problem List   Diagnosis    Disruptive behavior disorder    Attention deficit hyperactivity disorder (ADHD)    Episodic mood disorder (H24)    Uterine size  "date discrepancy    Pregnancy, supervision, high-risk, third trimester    Major depression    Anxiety    Low weight gain during pregnancy    Generalized anxiety disorder       No past surgical history on file.     Medical Review of Systems                                                                                       2,10   none in addition to that documented above  Allergy                                Azithromycin, Doxycycline, Amoxicillin, and Zithromax [azithromycin]  Current Medications                                                                                                         Current Outpatient Medications   Medication Sig Dispense Refill    miconazole (MONISTAT 1 DAY OR NIGHT) 1200 & 2 MG & % kit Use one application at night for 7 nights. 1 kit 1    Prenatal Vit-Fe Fumarate-FA (PRENATAL MULTIVITAMIN W/IRON) 27-0.8 MG tablet Take 1 tablet by mouth daily 90 tablet 3    sertraline (ZOLOFT) 50 MG tablet Take 1 tablet (50 mg) by mouth daily 30 tablet 1     Vitals                                                                                             LMP 11/27/2022 (Exact Date)    Mental Status Exam                                                                            9, 14 cog gs   Alertness: alert  and oriented  Appearance: well groomed  Behavior/Demeanor: cooperative and pleasant, with good  eye contact   Speech: normal and regular rate and rhythm  Language: intact  Psychomotor: normal or unremarkable  Mood:  \"good\"  Affect: full range; was congruent to mood; was congruent to content  Thought Process/Associations:  linear and logical  Thought Content:  Reports none;  Denies suicidal ideation and violent ideation  Perception:  Reports none;  Denies auditory hallucinations and visual hallucinations  Insight: good  Judgment: good  Cognition: (6) does  appear grossly intact; formal cognitive testing was not done  Gait and Station:  N/A ([facility])     Labs and Data         8/14/2023     " 2:54 PM 11/3/2023     7:45 AM 11/15/2023     4:00 PM   PHQ   PHQ-9 Total Score 9 3 0   Q9: Thoughts of better off dead/self-harm past 2 weeks More than half the days Not at all Not at all   F/U: Thoughts of suicide or self-harm Yes     F/U: Self harm-plan Yes     F/U: Self-harm action No     F/U: Safety concerns No             11/3/2023     7:53 AM 11/15/2023     4:01 PM   PROMIS-10 Total Score w/o Sub Scores   PROMIS TOTAL - SUBSCORES 24 40         11/3/2023     7:54 AM   CAGE-AID Total Score   Total Score 4   Total Score MyChart 4 (A total score of 2 or greater is considered clinically significant)         2023     2:54 PM 11/3/2023     7:45 AM 11/15/2023     4:00 PM   PHQ-9 SCORE   PHQ-9 Total Score MyChart 9 (Mild depression) 3 (Minimal depression) 0   PHQ-9 Total Score 9 3 0         2023    10:15 AM 7/3/2023    12:48 PM 11/3/2023     7:50 AM   GUCCI-7 SCORE   Total Score   15 (severe anxiety)   Total Score 4 2 15       Liver/Kidney Function, TSH Metabolic Blood counts   Recent Labs   Lab Test 21  2155   AST 22   ALT 15   ALKPHOS 57   CR 0.64     No lab results found. No lab results found.  No lab results found.  Recent Labs   Lab Test 21  2155   GLC 86    Recent Labs   Lab Test 23  0916   WBC 7.7   HGB 12.1   HCT 35.6   MCV 93              ECG N/A QTc = N/Ams      In conclusion: The risks/benefits/alternatives of sertraline in lactation/pregnancy have been discussed with Kevin Urena. Reviewed that there are no absolutes as far as medication safety in pregnancy/lactation. General r/b/a of treatment and medications were also discussed. We also reviewed the risks of untreated  mood and anxiety disorders to both mother and baby/families. We reviewed SE of sertraline as well as general signs/symptoms in her breastfeeding child to monitor for ( lethargy, decreased suck, change in behavior). She understands she is to arrange assistance with childcare while taking  medications that may cause sedation. Kevin Urena has had her questions answered, expresses her understanding of the information provided, and appears to have the capacity to give informed consent regarding treatment options.  resources were provided to the patient as about and as outlined in AVS.        PROVIDER: Robles Badillo MD, MPH        Psychiatry Individual Psychotherapy Note   Psychotherapy start time - N/A  Psychotherapy end time - N/A  Date treatment plan last reviewed with patient - N/A  Subjective: This supportive psychotherapy session addressed issues related to goals of therapy and current psychosocial stressors. Patient's reaction: Action in the context of mental status appropriate for ambulatory setting.    Interactive complexity indicated? No  Plan: RTC in timeframe noted above  Psychotherapy services during this visit included myself and the patient.   Treatment Plan      SYMPTOMS; PROBLEMS   MEASURABLE GOALS;    FUNCTIONAL IMPROVEMENT / GAINS INTERVENTIONS DISCHARGE CRITERIA   Anxiety: excessive worry and nervous/overwhelmed  Medications: Increased adherence   Increase strategies for coping with anxiety Supportive / psychodynamic marked symptom improvement

## 2023-11-15 ENCOUNTER — VIRTUAL VISIT (OUTPATIENT)
Dept: PSYCHIATRY | Facility: CLINIC | Age: 23
End: 2023-11-15
Attending: STUDENT IN AN ORGANIZED HEALTH CARE EDUCATION/TRAINING PROGRAM
Payer: COMMERCIAL

## 2023-11-15 DIAGNOSIS — F33.1 MODERATE EPISODE OF RECURRENT MAJOR DEPRESSIVE DISORDER (H): Primary | ICD-10-CM

## 2023-11-15 DIAGNOSIS — F41.1 GENERALIZED ANXIETY DISORDER: ICD-10-CM

## 2023-11-15 PROCEDURE — 99214 OFFICE O/P EST MOD 30 MIN: CPT | Mod: VID | Performed by: STUDENT IN AN ORGANIZED HEALTH CARE EDUCATION/TRAINING PROGRAM

## 2023-11-15 ASSESSMENT — PATIENT HEALTH QUESTIONNAIRE - PHQ9
SUM OF ALL RESPONSES TO PHQ QUESTIONS 1-9: 0
SUM OF ALL RESPONSES TO PHQ QUESTIONS 1-9: 0
10. IF YOU CHECKED OFF ANY PROBLEMS, HOW DIFFICULT HAVE THESE PROBLEMS MADE IT FOR YOU TO DO YOUR WORK, TAKE CARE OF THINGS AT HOME, OR GET ALONG WITH OTHER PEOPLE: NOT DIFFICULT AT ALL

## 2023-11-15 NOTE — PATIENT INSTRUCTIONS
Thank you for coming to the [facility] Women[facility]being Program.     Treatment Plan    Medications:  Continue sertraline 50mg daily    Therapy:  Continue therapy with Dr. Flores    RTC: In 2 weeks      Lab Testing:  If you had lab testing today and your results are reassuring or normal they will be mailed to you or sent through GOQii within 7 days. If the lab tests need quick action we will call you with the results. The phone number we will call with results is # [phone]. If this is not the best number please call our clinic and change the number.     Medication Refills:  If you need any refills please call your pharmacy and they will contact us. Our fax number for refills is [phone].   Three business days of notice are needed for general medication refill requests.   Five business days of notice are needed for controlled substance refill requests.   If you need to change to a different pharmacy, please contact the new pharmacy directly. The new pharmacy will help you get your medications transferred.     Contact Us:  Please call [phone] during business hours (8-5:00 M-F).   If you have medication related questions after clinic hours, or on the weekend, please call [phone].     Financial Assistance [phone]   Medical Records [phone]       **For crisis resources, please see the information at the end of this document**     To find additional local resources, refer to Postpartum Support International (PSI). Available at: http://www.postpartum.net/get-help/locations/united-states/  -[facility] for Women's Mental Health at: www.womensmentalhealth.org is a good resource for information about psychiatric medication in pregnancy/lactation  -Mother To Baby A service of the nonprofit Organization of [employer] (ALEXANDRA), provides evidence based information online and in printable handouts to provide patients and providers regarding medications and other exposures  "during pregnancy and lactation Available at: https://mothertobaby.org/fact-sheets-parent/.  -The 4th Trimester Project - Expert written resources and information for mothers and families. Available at: https://[employer]/  -Consider using \"The Pregnancy and Postpartum Anxiety Workbook,\" by Maile Maloney PhD and Beka East PsyD.    Postpartum Planning Tools:  Maternal Wellbeing Plan from [facility]: https://www.health.[facility].[place].us/people/womeninfants/pmad/pmadsfs.html    4th Trimester Postpartum plan: https://[employer]/mypostpartumplan    Tips for partners:  http://www.postpartum.net/family/tips-for-postpartum-dads-and-partners/        MENTAL HEALTH CRISIS RESOURCES:  For a emergency help, please call 911 or go to the nearest Emergency Department.     Emergency Walk-In Options:   EmPATH Unit @ [facility]): [phone] - Specialized mental health emergency area designed to be [facility]  [facility] ([place]): [phone]  [facility] ([place]): [phone]  [facility]): [phone]    [facility] Crisis Information:   [place]: [phone]  [place]: [phone]  [place] ([place]) - Adult: [phone]     Child: [phone]  [place] - Adult: [phone]     Child: [phone]  [place]: [phone]  List of all [employer] resources:   https://[place].gov/dhs/people-we-serve/adults/health-care/mental-health/resources/crisis-contacts.jsp    National Crisis Information:   Crisis Text Line: Text  [place]  to 646856  Suicide & Crisis Lifeline: 988  National Suicide Prevention Lifeline: 8-821-220-TALK (1-930.991.6078)       For online chat options, visit https://suicidepreventionlifeline.org/chat/  Poison Control Center: 1-850.308.1924  [facility] Lifeline: 1-282.862.6553 - Hotline for transgender people of all ages  The [place] Project: 4-510-319-0140 - Hotline for LGBT youth     For Non-Emergency Support:   Fast Tracker: Mental Health & Substance " Use Disorder Resources -   https://www.Speedyboytrack[employer] Navigationn.org/

## 2023-11-15 NOTE — NURSING NOTE
Is the patient currently in the state of [place]? YES    Visit mode:VIDEO    If the visit is dropped, the patient can be reconnected by: VIDEO VISIT: Text to cell phone:   Telephone Information:   Mobile [phone]       Will anyone else be joining the visit? No  (If patient encounters technical issues they should call [phone])    How would you like to obtain your AVS? MyChart    Are changes needed to the allergy or medication list? Pt stated no changes to allergies and Pt stated no med changes    Reason for visit: MENDOZA Sanches

## 2023-11-27 ENCOUNTER — VIRTUAL VISIT (OUTPATIENT)
Dept: PSYCHIATRY | Facility: CLINIC | Age: 23
End: 2023-11-27
Attending: PSYCHOLOGIST
Payer: COMMERCIAL

## 2023-11-27 DIAGNOSIS — F33.2 SEVERE EPISODE OF RECURRENT MAJOR DEPRESSIVE DISORDER, WITHOUT PSYCHOTIC FEATURES (H): Primary | ICD-10-CM

## 2023-11-27 PROCEDURE — 90834 PSYTX W PT 45 MINUTES: CPT | Mod: VID

## 2023-11-28 NOTE — PROGRESS NOTES
[facility]  OUTPATIENT PSYCHOTHERAPY PROGRESS NOTE    Client Name: Kevin Urena   YOB: 2000 (23 year old)   Date of Service:  Nov 27, 2023  Time of Service: 2:15 pm to 2:59 pm (44 minutes)  Service Type(s): 79292 psychotherapy (38-52 min. with patient and/or family)  Type of service: Telemedicine Psychotherapy   Reason for Telemedicine Visit: transportation barriers, to foster engagement   Mode of transmission: Secure real time interactive audio and visual telecommunication system ([employer])  Location of originating and distant sites:    Originating site (patient location): patient's car     Distant site (provider site): HIPAA compliant location within provider home/remote setting  Telemedicine Visit: The patient's condition can be safely assessed and treated via synchronous audio and visual telemedicine encounter. Patient/family has agreed to receive services via telemedicine technology.    Diagnoses:     Major depressive disorder, recurrent, severe, without psychotic features, with anxious distress (F33.2)    Individuals Present:   Patient presented with her infant son     Treatment goal(s) being addressed:   1. Re-establish care and therapeutic rapport given break since last appointment (8/2/23)  2. Assess current concerns/treatment needs in context of Kevin's recent discharge [facility] and transition/step-down to outpatient care with this writer   3. Partner with Kevin to discuss and begin to develop treatment plan to support her needs    Subjective:  Kevin is currently 12 weeks postpartum. She endorsed depressed mood in context of recent conflict with mother involving Kevin calling police when mother did not return home or answer her phone after taking Kevin's son to relative's home and staying out all night without discussing plans with Kevin. Kevin is currently estranged from mother and identified increased  nighttime parenting burden in absence of mother's support. Kevin endorsed acute, distressing suicidal ideation with intent but no plan experienced approximately one week ago in context of alcohol use. At that time Kevin managed safety concerns by sharing SI with jessica and leveraging support from him until thoughts/intent passed. Kevin shared that she feels SI had worsened, generally, since initiating Zoloft (see note by psychiatrist Robles Badillo MD, 11/3/23). Following her experience of acute SI she elected to discontinue Zoloft without consulting her psychiatrist. She denied any SI since then, and denied any current SI or safety concerns. She would like to work with a psychiatry provider to consider alternative psychiatric medications.     Treatment:   Individual psychotherapy with cognitive behavioral therapy (CBT) framework leveraging therapeutic techniques from  Strong Roots Dialectical Behavioral Therapy (SR-DBT), an evidence-based intervention to target mood concerns and promote relational health in parent-child dyad. Today, focus on re-establishing therapeutic rapport and assessing current treatment concerns and needs. Supported Kevin in reflecting on thoughts and feelings related to recent conflict with her mother. Provided psyhcoeducation and used MI to explore and elicit change talk related to goals and intentions around healthy emotional boundaries as tool to promote safety and stability. Partnered with Kevin to brainstorm strategies for leveraging instrumental and emotional support in context of estrangement from mother. With Kevin, discussed adjustment to postpartum period and new parenting role, provided coaching and positive reinforcement of use of coping strategies to manage parenting stress. Facilitative approached used to promote dyadic and relational health by recognizing and enhancing parental reflective functioning, attunement to infant cues, and sensitive and  responsive parenting behaviors. Discussed recent SI, assessed for current safety concerns, and discussed safety plan and resources. Kevin expressed desire to continue medical management services and requested referrals to general psychiatry clinic; active and reflective listening used in discussing Kevin's feedback and wishes. Throughout appointment provided validation, modeled nonjudgmental stance and curiosity in Kevin's thoughts/feelings/expeirences, and leveraged lens of cultural humility.     Assessment and Progress:   Appearance: Appropriately dressed and groomed   Behavior/relationship to examiner/demeanor: open, responsive   Motor activity/EPS: Within normal limits  Speech rate:  Within normal limits  Speech volume:  Within normal limits  Speech articulation:  Within normal limits  Speech coherence:  Within normal limits  Speech spontaneity:  Within normal limit  Mood (subjective report): Endorsed severe low mood with suicidal ideation with active intent but no plan experienced approximately one week ago in context of drinking and associated by patient with initiating Zoloft. At that time she discontinued Zoloft and denied any SI since that time. Denied current SI, plan, or intent to harm or kill herself. Denied any current safety concerns.    Affect (objective appearance): Within normal limits   Thought Process (Associations): Goal directed  Thought process (Rate):  Within normal limits  Thought content: None  Abnormal Perception: None  Insight:  Within normal limits  Judgment:  Within normal limits     Currently, Kevin is motivated to initiate outpatient psychotherapy to target symptoms of depression, including depressed mood, thoughts of worthlessness, and chronic suicidal ideation, following recent discharge from [facility]. I believe Kevin will benefit significantly from ongoing support and continuing to engage in outpatient treatment.      Plan:   Continue regular weekly psychotherapy.     Today Kevin endorsed recent suicidal ideation with intent but no plan, and denied current suicidal ideation or any safety concerns. We discussed safety plan and resources, including the following:      Call 911 or present to ED with safety concerns, such as:      [place] mental health crisis services at [facility]: [email or web address] mental health crisis services at [facility]: [email or web address]     Kevin offered insight into the influence of alcohol use on her mood and suicidal ideation. Today we began to explore disadvantages of alcohol use, benefits of reduction and avoidance of use, and Kevin's thoughts and feelings related to her optimism and intension in reducing/avoiding alcohol use.    Next therapy appointment has been scheduled for 12/4/23 to continue work on treatment goals.    Treatment Plan review due: upcoming appointment     Luisa Flores, PhD,   Clinical Psychology Postdoctoral Fellowship        I did not see this patient directly, but have discussed the session with the above trainee and approve this documentation.      Lori Romero Psy.D.,                                                                                         Clinical Supervisor

## 2023-12-04 ENCOUNTER — VIRTUAL VISIT (OUTPATIENT)
Dept: PSYCHIATRY | Facility: CLINIC | Age: 23
End: 2023-12-04
Attending: PSYCHOLOGIST
Payer: COMMERCIAL

## 2023-12-04 ENCOUNTER — TELEPHONE (OUTPATIENT)
Dept: PSYCHIATRY | Facility: CLINIC | Age: 23
End: 2023-12-04
Payer: COMMERCIAL

## 2023-12-04 DIAGNOSIS — F33.2 SEVERE EPISODE OF RECURRENT MAJOR DEPRESSIVE DISORDER, WITHOUT PSYCHOTIC FEATURES (H): Primary | ICD-10-CM

## 2023-12-04 PROCEDURE — 90834 PSYTX W PT 45 MINUTES: CPT | Mod: VID

## 2023-12-04 NOTE — PROGRESS NOTES
[facility]  OUTPATIENT PSYCHOTHERAPY PROGRESS NOTE     Client Name: Kevin Urena    YOB: 2000 (23 year old)          Date of Service:  Dec 4, 2023  Time of Service: 2:39 pm to 3:25 pm (46 minutes)  Service Type(s): 09597 psychotherapy (38-52 min. with patient and/or family)  Type of service: Telemedicine Psychotherapy   Reason for Telemedicine Visit: transportation barriers, to foster engagement   Mode of transmission: Secure real time interactive audio and visual telecommunication system ([employer])  Location of originating and distant sites:  ? Originating site (patient location): patient's home  ? Distant site (provider site): HIPAA compliant location within provider home/remote setting  Telemedicine Visit: The patient's condition can be safely assessed and treated via synchronous audio and visual telemedicine encounter. Patient/family has agreed to receive services via telemedicine technology.     Diagnoses:     Major depressive disorder, recurrent, severe, without psychotic features, with anxious distress (F33.2)     Individuals Present:   Patient presented with her infant son      Treatment goal(s) being addressed:   1. Re-establish therapeutic rapport given break between initial appointment (8/2/23) and recently re-establishing care (11/27)  2. Assess current concerns/treatment needs in context of Kevin's recent discharge from [facility] and transition/step-down to outpatient care with this writer   3. Partner with Kevin to discuss and develop treatment plan to support her needs     Subjective:  Kevin is currently 13 weeks postpartum. She endorsed depressed mood and passive suicidal ideation without intent or plan in context of conflict with her fiance over the past few days. Conflict with fiance centered around infidelity concerns. She denied any current safety concerns. Kevin confirmed she has not used marijuana or alcohol in past  week as part of goal/effort to target and reduce depressed mood and particularly SI.     Kevin denied any changes to medication treatment plan since our last appointment (specifically, she has not restarted Zoloft since discontinuing several weeks ago). She confirmed she would like to work with another psychiatry provider to consider alternative psychiatric medications.       Treatment:   Individual psychotherapy with cognitive behavioral therapy (CBT) framework leveraging therapeutic techniques from  Strong Roots Dialectical Behavioral Therapy (SR-DBT), an evidence-based intervention to target mood dysregulation and interpersonal effectiveness concerns and promote relational health in parent-child dyad. Today, ongoing focus on re-establishing therapeutic rapport and assessing current treatment concerns and needs. Supported Kevin in reflecting on thoughts and feelings related to recent conflict with her fiance related to infidelity. Continued to explore and support Kevin in her goal to set healthy emotional boundaries in relationships with fiance and mother. Clarified Kevin's goal to communicate need for space to jessica; supported Keivn in brainstorming strategies to communicate needs and set and reinforce boundaries, and leverage instrumental and emotional support from friend Reality as needed/desired, including option to stay with Reality for a few days. Continued to provide coaching and positive reinforcement of use of coping strategies to manage low mood, passive SI, and relationship and parenting stress. Continued to use facilitative approach to promote dyadic and relational health with infant son by recognizing and enhancing parent attunement to infant cues, and use of sensitive and responsive parenting behaviors. With Kevin, assessed recent passive SI, any current safety concerns, and together we reviewed safety plan and resources (calling 911, present to ED). Revisited  Kevin's commitment to reduce marijuana and alcohol use, leveraging MI to elicit and enhance change talk and recognize and promote actions taken to reduce use. Revisited Kevin's desire to continue medical management services with a new provider, and discussed option to establish care with NP or resident in our general psychiatry clinic. Kevin was open to this plan. Throughout appointment provided validation, modeled nonjudgmental stance and curiosity in [place] thoughts/feelings/expeirences, and leveraged lens of cultural humility.      Assessment and Progress:   Appearance: Appropriately dressed and groomed, reported feeling tired and drained following conflict with fiance   Behavior/relationship to examiner/demeanor: open, responsive   Motor activity/EPS: Within normal limits  Speech rate:  Within normal limits  Speech volume:  Within normal limits  Speech articulation:  Within normal limits  Speech coherence:  Within normal limits  Speech spontaneity:  Within normal limit  Mood (subjective report): Endorsed depressed mood with passive suicidal ideation; denied any active intent or plan to harm or kill herself. Denied any current safety concerns.    Affect (objective appearance): mildly flat affect,within normal limits   Thought Process (Associations): Goal directed  Thought process (Rate):  Within normal limits  Thought content: None  Abnormal Perception: None  Insight:  Within normal limits  Judgment:  Within normal limits     Currently, Kevin is motivated to initiate outpatient psychotherapy to target symptoms of depression, including depressed mood, thoughts of worthlessness, and chronic suicidal ideation, following recent discharge from [facility]. I believe Kevin will benefit significantly from ongoing support and continuing to engage in outpatient treatment.      Plan:   Continue regular weekly psychotherapy.      Today Kevin endorsed  recent passive suicidal ideation without intent or plan; she denied current safety concerns. We continued to review safety plan and resources, including the following:      Call 911 or present to ED with safety concerns, such as:      [place] mental health crisis services at [facility]: [email or web address] mental health crisis services at [facility]: [email or web address]     Kevin offered insight into the influence of alcohol use on her mood and suicidal ideation. Today we continued to explore disadvantages of alcohol use, benefits of reduction and avoidance of use, and Kevin's thoughts and feelings related to her optimism and intension in reducing/avoiding alcohol use.    Given Kevin's desire to establish medication management with another provider, I provided contact information for [facility] to schedule with NP or resident: [phone]. I let Kevin know [facility] administrative patient lead, Rosa, would also reach out to offer to assist with scheduling. Kevin was grateful for Rosa's assistance and agreed to call the clinic herself if she and Rosa did not connect in the next few days.      Next therapy appointment has been scheduled for 12/11/23 to continue work on treatment goals.     Treatment Plan review due: upcoming appointment      Luisa Flores, PhD,   Clinical Psychology Postdoctoral Fellowship           I did not see this patient directly, but have discussed the session with the above trainee and approve this documentation.      Lori Romero Psy.D.,                                                                                         Clinical Supervisor

## 2023-12-05 ENCOUNTER — CARE COORDINATION (OUTPATIENT)
Dept: PSYCHIATRY | Facility: CLINIC | Age: 23
End: 2023-12-05
Payer: COMMERCIAL

## 2023-12-08 NOTE — TELEPHONE ENCOUNTER
This writer left a vm for Kevin to follow-up on discussion around desire to return to [facility]. Encouraged her to contact [facility] to understand options for immediate openings in [facility], or alternative levels of support. Requested Kevin follow up with me with any concerns connecting with [facility]. check if and let her know I've been thinking of her, and reminded her of our appointment on Monday.        Luisa Flores, PhD,   Clinical Psychology Postdoctoral Fellow  [facility]  [phone]

## 2023-12-11 ENCOUNTER — TELEPHONE (OUTPATIENT)
Dept: PSYCHIATRY | Facility: CLINIC | Age: 23
End: 2023-12-11
Payer: COMMERCIAL

## 2023-12-11 NOTE — TELEPHONE ENCOUNTER
This writer left a vm for Kevin to check if she is still able to attend our appointment for today at 2 pm. Let her know I wanted to check in to see if she had called [facility] since she and I connected at the end of last week. Requested Kevin to let me know. I reminded Kevin I will be out of the office for most of the next two weeks for the holidays, with the exception of 12/27. I offered several openings on that day, and asked Kevin to let me know if she would like to schedule an appointment. Provided direct contact information and encouraged Kevin to call me back.       Luisa Flores, PhD, LP  Postdoctoral Clinical Psychology Fellow  [facility]  [phone]

## 2023-12-12 NOTE — TELEPHONE ENCOUNTER
"This writer received a call from Kevin who apologized for missing our appointment today and shared she was unable to attend as she was in the ED with her son, Kervin. They waited 6 hours to be seen. Kervin has a bad cough, and is \"doing ok.\"    Checked in on Kevin's interest in returning to [facility] for more intensive support while coping with recent relationship stress with her fiance. Kevin shared she remains interested in returning to the program, and, hasn't yet called to initiate return. She shared she feels going back to the program is a \"great idea, but takes a lot of courage and I'm scared to do it.\" With Kevin, briefly explored concerns/barriers - namely sharing with family her need for intensive support, and experiencing rejection, and delaying start of her GED program. MI and solution oriented approached used to support Kevin in eliciting/enhancing change talk and brainstorming approaches to leveraging social support during this time. Offered options to reschedule our missed appointment, and/or call [facility] together, if helpful. Kevin expressed the desire to take some time to consider options and agreed to get back to me.     Luisa Flores, PhD, LP  Postdoctoral Clinical Psychology Fellow  [facility]  [phone]   "

## 2023-12-14 ENCOUNTER — TELEPHONE (OUTPATIENT)
Dept: PSYCHIATRY | Facility: CLINIC | Age: 23
End: 2023-12-14
Payer: COMMERCIAL

## 2023-12-14 NOTE — TELEPHONE ENCOUNTER
This writer called to check in on Kevin, offer rescheduling options prior to this writer's upcoming time out of office time, remind Kevin I will be out of the office 12/18-12/26, and offer options to meet on 12/27 if we aren't able to meet before the end of this week. Because Kevin's vm box was full I wasn't able to leave a vm, and sent a text via [email or web address]. Provided direct contact information and encouraged Kevin to call me back.       Luisa Flores, PhD, LP  Postdoctoral Clinical Psychology Fellow  [facility]  [phone]

## 2023-12-15 ENCOUNTER — TELEPHONE (OUTPATIENT)
Dept: PSYCHIATRY | Facility: CLINIC | Age: 23
End: 2023-12-15
Payer: COMMERCIAL

## 2023-12-15 NOTE — TELEPHONE ENCOUNTER
This writer called to check in on Kevin, and remind her I will be out of the office. As my 12/27 openings have been filled I Iet her know my next earliest openings were for the week of 1/2 and encouraged her to call me back directly to schedule an appointment for that week. Because Vanessaluzlucy's vm box was full I was again unableto leave a vm, and sent a text via [email or web address].       Luisa Flores, PhD, LP  Postdoctoral Clinical Psychology Fellow  [facility]  [phone]

## 2023-12-19 ENCOUNTER — TELEPHONE (OUTPATIENT)
Dept: MIDWIFE SERVICES | Facility: CLINIC | Age: 23
End: 2023-12-19
Payer: COMMERCIAL

## 2023-12-19 NOTE — CONFIDENTIAL NOTE
Pt contacted and she states her breast pump from [employer] is not working properly.  She is currently on the phone with [employer] to try to troubleshoot.  As requested, breast pump Rx printed off for pt and sent to  for pt to  in the case that she needs a brand-new breast pump.  Discussed I am unsure if her insurance will cover a new pump and to check insurance for coverage details. Also reviewed DME show room on the third floor of the [facility].    RAPHAEL Ruffin, CNM, IBCLC  [facility]  Midwifery

## 2023-12-19 NOTE — TELEPHONE ENCOUNTER
[facility] Call Center    Phone Message    May a detailed message be left on voicemail: yes     Reason for Call: Other: Pt is calling in regards to her breast pump. Pt states breast pump is no longer working properly and was advised to call us for assistance in getting a new one. Please call patient to discuss.     Action Taken: Other: OBGYN    Travel Screening: Not Applicable

## 2024-01-03 ENCOUNTER — VIRTUAL VISIT (OUTPATIENT)
Dept: PSYCHIATRY | Facility: CLINIC | Age: 24
End: 2024-01-03
Payer: COMMERCIAL

## 2024-01-03 DIAGNOSIS — F33.2 SEVERE EPISODE OF RECURRENT MAJOR DEPRESSIVE DISORDER, WITHOUT PSYCHOTIC FEATURES (H): Primary | ICD-10-CM

## 2024-01-03 DIAGNOSIS — R45.851 SUICIDAL IDEATION: ICD-10-CM

## 2024-01-03 PROCEDURE — 90837 PSYTX W PT 60 MINUTES: CPT | Mod: 95

## 2024-01-03 ASSESSMENT — PATIENT HEALTH QUESTIONNAIRE - PHQ9
10. IF YOU CHECKED OFF ANY PROBLEMS, HOW DIFFICULT HAVE THESE PROBLEMS MADE IT FOR YOU TO DO YOUR WORK, TAKE CARE OF THINGS AT HOME, OR GET ALONG WITH OTHER PEOPLE: EXTREMELY DIFFICULT
SUM OF ALL RESPONSES TO PHQ QUESTIONS 1-9: 21
SUM OF ALL RESPONSES TO PHQ QUESTIONS 1-9: 21

## 2024-01-03 ASSESSMENT — ANXIETY QUESTIONNAIRES
6. BECOMING EASILY ANNOYED OR IRRITABLE: NEARLY EVERY DAY
GAD7 TOTAL SCORE: 19
2. NOT BEING ABLE TO STOP OR CONTROL WORRYING: NEARLY EVERY DAY
GAD7 TOTAL SCORE: 19
4. TROUBLE RELAXING: NEARLY EVERY DAY
8. IF YOU CHECKED OFF ANY PROBLEMS, HOW DIFFICULT HAVE THESE MADE IT FOR YOU TO DO YOUR WORK, TAKE CARE OF THINGS AT HOME, OR GET ALONG WITH OTHER PEOPLE?: EXTREMELY DIFFICULT
3. WORRYING TOO MUCH ABOUT DIFFERENT THINGS: NEARLY EVERY DAY
7. FEELING AFRAID AS IF SOMETHING AWFUL MIGHT HAPPEN: NEARLY EVERY DAY
5. BEING SO RESTLESS THAT IT IS HARD TO SIT STILL: NEARLY EVERY DAY
GAD7 TOTAL SCORE: 19
1. FEELING NERVOUS, ANXIOUS, OR ON EDGE: SEVERAL DAYS
IF YOU CHECKED OFF ANY PROBLEMS ON THIS QUESTIONNAIRE, HOW DIFFICULT HAVE THESE PROBLEMS MADE IT FOR YOU TO DO YOUR WORK, TAKE CARE OF THINGS AT HOME, OR GET ALONG WITH OTHER PEOPLE: EXTREMELY DIFFICULT
7. FEELING AFRAID AS IF SOMETHING AWFUL MIGHT HAPPEN: NEARLY EVERY DAY

## 2024-01-03 NOTE — PROGRESS NOTES
[facility]  OUTPATIENT PSYCHOTHERAPY PROGRESS NOTE    Client Name: Kevin Urena   YOB: 2000 (23 year old)   Date of Service:  Daniel 3, 2024  Time of Service: 1:00 pm to 1:53 pm (53 minutes)  Service Type(s): 16167 psychotherapy (53-60 min. with patient and/or family)  Type of service: Telemedicine Psychotherapy   Reason for Telemedicine Visit: transportation barriers, patient preference, to foster engagement  Mode of transmission: Secure real time interactive audio and visual telecommunication system ([employer])  Location of originating and distant sites:    Originating site (patient location): patient's car     Distant site (provider site): HIPAA compliant location within remote setting  Telemedicine Visit: The patient's condition can be safely assessed and treated via synchronous audio and visual telemedicine encounter. Patient/family has agreed to receive services via telemedicine technology.    Diagnoses:     Major depressive disorder, recurrent, severe, without psychotic features, with anxious distress (F33.2)    Suicidal ideation (R45.851)    Individuals Present:   Patient initially presented with her infant son, and later was joined by her mother and brother who were present in the car with her but not participating in the session     Treatment goal(s) being addressed:   1. Continue to strengthen therapeutic rapport given break between initial appointment (8/2/23), recently re-establishing care (11/27), and intermittent engagement   2. Continue to assess current concerns/treatment needs in context of Kevin's recent discharge from [facility] and transition/step-down to outpatient care with this writer   3. Support harm reduction approach to substance use, and continue to explore treatment options with Kevin (e.g., [facility])    4. Support Kevin's use of safety plan and resources we have developed for her to use in response to any  "suicidal ideation, and continue to explore treatment options for higher-level of care (e.g., we have been discussing option to [facility])     Subjective:  Kevin is currently approximately 17 weeks postpartum. She endorsed depressed mood and suicidal ideation with intent and plan to kill herself experienced 3 days ago in context of conflict with baby's father and alcohol use. She reported she left her son in her mother's care and left mother's home with the plan/intent to kill herself, either by obtaining \"drugs to overdose on\" and/or by taking drugs prior to walking in front of a bus. Kevin met and obtained recreational drug from an unknown contact. She reported \"I wasn't expecting to take uppers\" and described negative experience with recreational drugs. She reported drug provider offered support (\"you're too pretty to die\") and she decided not to act on suicidal thoughts. She was unable to sleep for the next two days following recreational drug use. During this time, Kevin's infant son remained in Kevin's mother's care. Mother encouraged Kevin to take anti-nausea OTC medication (she was unable to remember name of medication); Kevin feels this medication helped her to sleep and supported milk production after disruption she attributed to recreational substance use. She \"pumped and dumped\" milk for 48 hours after substance use to ensure son was not exposed.     Today Kevin denied any current active or passive suicidal ideation or any safety concerns, and reported she has not had any suicidal ideation since earlier this week. Denied any history or current concerns related to infanticide or homicide. She reported mood as \"ok\" today. She was emphatic that she wishes to live and does not want to leave her baby. She feels alcohol use worsened SI and reported desire to avoid alcohol and substance use going forward. Kevin noted she has reconciled with " baby's father and she and baby have returned to living with him in their apartment. She feels safe in this relationship.      Kevin denied any medication changes. She confirmed she would like to reschedule her psychiatry appointment with RAPHAEL Lewis, CNP, to explore psychiatric medication options.    Treatment:   Individual psychotherapy with cognitive behavioral therapy (CBT) framework leveraging therapeutic techniques from  Strong Roots Dialectical Behavioral Therapy (SR-DBT), an evidence-based intervention to target mood dysregulation and interpersonal effectiveness concerns and promote relational health in parent-child dyad. Today, began with assessment of suicidal ideation and any safety concerns. MI leveraged to facilitate harm reduction approach by exploring, eliciting, and enhancing change talk to reduce alcohol and substance use with Kevin. With Kevin, shared resources and developed safety plan for if suicidal ideation returns or worsens. Provided psychoeducation and coaching on use of coping strategies to manage distress tolerance and promote safety, focused on STOP skill and Soothe with the 6 Senses. Continued to provide coaching and positive reinforcement of use of coping strategies to manage low mood. Continued to use facilitative approach to promote dyadic and relational health with infant son by recognizing and enhancing parent attunement to infant cues, and use of sensitive and responsive parenting behaviors. Together we revisited our previous discussion around higher level of care, including [facility]. Discussed and shared resources related to [facility] treatment and support options. At this time Kevin reported she is not open to a higher level of care because she does not feel this level of support is needed, and she wishes to engage in upcoming 30 week GED program that begins on . Revisited Kevin's desire to  continue medical management services with a new provider; Kevin confirmed she is open/interested in rescheduling with RAPHAEL Lewis CNP. Throughout appointment provided validation, modeled nonjudgmental stance and curiosity in Kevin's thoughts/feelings/expeirences, and leveraged lens of cultural humility.     Assessment and Progress:   Appearance: Appropriately dressed and groomed    Behavior/relationship to examiner/demeanor: open, honest, responsive   Motor activity/EPS: Within normal limits  Speech rate:  Within normal limits  Speech volume:  Within normal limits  Speech articulation:  Within normal limits  Speech coherence:  Within normal limits  Speech spontaneity:  Within normal limit  Mood (subjective report): Was tearful at moments while reporting recent depressed mood with suicidal ideation and active intent/plan to kill herself. Today, mood presented within normal limits overall. Today denied any active intent or plan to harm or kill herself. Denied any current safety concerns.    Affect (objective appearance): Within normal limits   Thought Process (Associations): Goal directed  Thought process (Rate):  Within normal limits  Thought content: None  Abnormal Perception: None  Insight:  Within normal limits  Judgment:  Within normal limits     Today we discussed concerns related to Kevin's recent SI with active intent/plan to kill herself and my recommendation to consider a higher level of care ([facility]ing [facility], considering treatment and support through [facility]). Currently, Kevin is not open/interested in a higher level of support, but confirmed she remains motivated to continue to engage in outpatient psychotherapy to target symptoms of depression, including depressed mood, thoughts of worthlessness, and chronic suicidal ideation, following recent discharge from [facility] program. We spent time discussing  importance of use of safety plan, resources, and coping strategies if suicidal ideation returns/worsens.      Answers for HPI/ROS submitted by the patient on 1/3/2024  If you checked off any problems, how difficult have these problems made it for you to do your work, take care of things at home, or get along with other people?: Extremely difficult  PHQ9 TOTAL SCORE: 21, indicating severe depressive symptoms; endorsed SI with active intent/plan experienced 3 days ago. Today Kevin denied any current SI or safety concerns. We discussed safety plan and resources should SI return or worsen.   GUCCI 7 TOTAL SCORE: 19, indicating severe anxiety symptoms    Plan:   If suicidal ideation returns or worsens, follow safety plan we developed today and leverage resources and coping strategies we discussed:     1. Reduce/avoid alcohol use as Kevin is aware this is a factor that can worsen SI.   2. Call 911 or present directly to ED with active intent or plan. This is a psychiatric emergency and you are deserving of support! Recommended [facility] APS Unit or [facility]'s EmPATH Unit given Kevin's previous negative ED experience at [facility] following a suicide attempt.   3. Use STOP or Soothe with the 6 Senses Coping Strategies to help tolerate distress on the way to ED      4. Use Crisis Support Lines for support when suicidal ideation is passive and does not involve active intent or plan:         Today we discussed recommendation to consider a higher level of support, including:     [facility]:  [email or web address]    [facility]: [email or web address]  At this time Kevin is not open/interested in a higher level of support. She is open to continuing to discuss these options.      Reschedule psychiatric medication consultation with Brittny Jimenez APRN, CNP. Call [facility] psychiatry clinic to reschedule: [phone]  [phone]    Next therapy appointment has been scheduled for 1/8/24 at 3 pm to continue work on treatment goals.    Treatment Plan review due: upcoming appointment     Luisa Flores, PhD, LP  , Dept of Psychiatry

## 2024-01-08 ENCOUNTER — VIRTUAL VISIT (OUTPATIENT)
Dept: PSYCHIATRY | Facility: CLINIC | Age: 24
End: 2024-01-08
Payer: COMMERCIAL

## 2024-01-08 DIAGNOSIS — F33.2 SEVERE EPISODE OF RECURRENT MAJOR DEPRESSIVE DISORDER, WITHOUT PSYCHOTIC FEATURES (H): Primary | ICD-10-CM

## 2024-01-08 DIAGNOSIS — Z91.51 HISTORY OF SUICIDAL BEHAVIOR: ICD-10-CM

## 2024-01-08 PROCEDURE — 90834 PSYTX W PT 45 MINUTES: CPT | Mod: 95

## 2024-01-08 NOTE — PROGRESS NOTES
[facility]OMEN'S [facility]LL[facility]ING PROGRAM  OUTPATIENT PSYCHOTHERAPY PROGRESS NOTE     Client Name: Kevin Urena    YOB: 2000 (23 year old)          Date of Service:  Jan 8, 2024  Time of Service: 3:08 pm to 3:58 pm (50 minutes)  Service Type(s): 82818 psychotherapy (38-52 min. with patient and/or family)  Type of service: Telemedicine Psychotherapy   Reason for Telemedicine Visit: transportation barriers, patient preference, to foster engagement  Mode of transmission: Secure real time interactive audio and visual telecommunication system ([employer])  Location of originating and distant sites:  ? Originating site (patient location): patient's mother's home   ? Distant site (provider site): HIPAA compliant location within remote setting  Telemedicine Visit: The patient's condition can be safely assessed and treated via synchronous audio and visual telemedicine encounter. Patient/family has agreed to receive services via telemedicine technology.     Diagnoses:     Major depressive disorder, recurrent, severe, without psychotic features, with anxious distress (F33.2)    History of suicidal ideation and behavior (Z91.51)     Individuals Present:   Patient and provider      Treatment goal(s) being addressed:   1. Continue to strengthen therapeutic rapport given break between initial appointment (8/2/23), recently re-establishing care (11/27), and intermittent engagement   2. Continue to assess current concerns/treatment needs in context of Kevin's recent discharge from [facility] program and transition/step-down to outpatient care with this writer   3. Support harm reduction approach to substance use, and continue to explore treatment options with Kevin (e.g., [facility])    4. Support Kevin's use of safety plan and resources we have developed for her to use in response to any suicidal ideation, and continue to explore treatment options for higher-level of care (e.g., [facility])      Subjective:  Kevin is currently approximately 18 weeks postpartum. Today Kevin described her mood as \"a lot better\". She denied any passive suicidal ideation or intent or plan to harm or kill herself since our last appointment. Kevin and Kervin have been staying with her mom since our last appointment, which has felt \"good\". Kevin's mother has provided caregiving support during day and night and instrumental and emotional support. Kevin noted she has been staying with her mom, rather than at her apartment with Rakan, in order to support herself in avoiding alcohol/substance use, as Rakan is a frequent user of both. Kevin has not used alcohol or substances since our last appointment, and believes it has had a positive impact on her mood. Today Kevin began her GED program through [employer] and enjoyed the structure and engagement of a typical school day and connecting with \"like minded people\" including three students who are parents or pregnant and about to become parents. She learned she is eligible for  through the program and is working to set that up. She noted the family is behind on rent and asked for resources for obtaining diapers for Kervin. Kevin was seen in the ED for asthma concerns 1/3/24 and prescribed a rescue inhaler and tylenol for chest pain.       Kevin is not currently taking any psychiatric medication. She continued to confirm she would like to reschedule her psychiatry appointment with RAPHAEL Lewis, CNP, to explore psychiatric medication options.      Treatment:   Individual psychotherapy with cognitive behavioral therapy (CBT) framework leveraging therapeutic techniques from  Strong Roots Dialectical Behavioral Therapy (SR-DBT), an evidence-based intervention to target mood dysregulation and interpersonal effectiveness concerns and promote relational health in parent-child dyad. Today, " began with continuing to assess for any suicidal ideation and any safety concerns. Ongoing use of MI to facilitate harm reduction approach by providing psychoeducation and exploring, eliciting, and enhancing change talk to reduce alcohol and substance use with Kevin. With Kevin, continued to review resources ([facility]) to support reduction of substance use, and use of safety plan and resources should suicidal ideation returns or worsens. Provided psychoeducation and coaching on use of behavioral activation to target, reduce, and manage low mood. Discussed behavioral activation plan for upcoming week. Continued to provide coaching and positive reinforcement of use of coping strategies to manage low mood, including leveraging social support. Shared resources for diapers and home visiting support. Began to discuss this writer's upcoming parental leave and explore options for Kevin to receive continuity of support/treatment. Throughout appointment provided validation, modeled nonjudgmental stance and curiosity in Kevin's thoughts/feelings/expeirences, and leveraged lens of cultural humility.      Assessment and Progress:   Appearance: Appropriately dressed and groomed  Behavior/relationship to examiner/demeanor: open, honest, responsive   Motor activity/EPS: Within normal limits  Speech rate:  Within normal limits  Speech volume:  Within normal limits  Speech articulation:  Within normal limits  Speech coherence:  Within normal limits  Speech spontaneity:  Within normal limit  Mood (subjective report): appeared brighter than our most recent appointment, within normal limits     Affect (objective appearance): Within normal limits   Thought Process (Associations): Goal directed  Thought process (Rate):  Within normal limits  Thought content: None  Abnormal Perception: None  Insight:  Within normal limits  Judgment:  Within normal limits     Today we continued to discuss option to leverage  resources for higher level of care/support ([facility], [facility] Currently, Kevin is not open/interested in engaging in a higher level of support, but expressed openness in receiving these resources to consider should she feel her needs change. She confirmed she remains motivated to continue to engage in outpatient psychotherapy to target symptoms of depression. We continued to discuss importance of use of safety plan, resources, and coping strategies if suicidal ideation returns/worsens.        Plan:   Continue with regular therapy.     Today we dicussed resources and support for parents, including:     [email or web address] (with Kevin's permission I submitted a referral today through [facility] for family home visiting programs, which is required by [facility] for residents of [facility])    If suicidal ideation returns or worsens, follow safety plan we developed today and leverage resources and coping strategies we discussed:      1. Reduce/avoid alcohol use as Kevin is aware this is a factor that can worsen SI.   2. Call 911 or present directly to ED with active intent or plan. This is a psychiatric emergency and you are deserving of support! Recommended [facility] APS Unit or [facility]'s EmPATH Unit given Kevin's previous negative ED experience at [facility] following a suicide attempt.   3. Use STOP or Soothe with the 6 Senses Coping Strategies to help tolerate distress on the way to ED      4. Use Crisis Support Lines for support when suicidal ideation is passive and does not involve active intent or plan:        Today we continued to discuss resources for higher level of support, including:     [facility]:  [email or web address]    [facility]:  [email or web address]  At this time Kevin is not open/interested in a higher level of support as she does not feel it is needed. She is aware of these options and open to considering them in the future if she feels her need for support changes.       Today we discussed option to reschedule initial appointment with psychiatry provider RAPHAEL Lewis, CNP. Call [facility] psychiatry clinic to reschedule: [phone]     Next therapy appointment has been scheduled for 1/16/24 at 4 pm to continue work on treatment goals.     Treatment Plan review due: upcoming appointment      Luisa Flores, PhD, LP  , Dept of Psychiatry

## 2024-01-10 ENCOUNTER — CARE COORDINATION (OUTPATIENT)
Dept: PSYCHIATRY | Facility: CLINIC | Age: 24
End: 2024-01-10
Payer: COMMERCIAL

## 2024-01-10 NOTE — PROGRESS NOTES
With Kevin's permission, this writer submitted a referral/request for diapers via [facility] organization, after hearing back about recently submitted referral to [facility] that Kervin is too old to receive diapers through their [employer]/home visiting program.    Helping  Grow ([facility])  [address] [place] [address]  [id number]  [email or web address]    [phone] (phone)  / [phone] (fax)    [place] [place]:   [phone]  [email or web address]

## 2024-01-11 ENCOUNTER — OFFICE VISIT (OUTPATIENT)
Dept: FAMILY MEDICINE | Facility: CLINIC | Age: 24
End: 2024-01-11
Payer: COMMERCIAL

## 2024-01-11 VITALS
RESPIRATION RATE: 20 BRPM | WEIGHT: 103.5 LBS | SYSTOLIC BLOOD PRESSURE: 104 MMHG | BODY MASS INDEX: 19.05 KG/M2 | OXYGEN SATURATION: 98 % | TEMPERATURE: 97.5 F | HEART RATE: 64 BPM | DIASTOLIC BLOOD PRESSURE: 60 MMHG | HEIGHT: 62 IN

## 2024-01-11 DIAGNOSIS — Z13.9 ENCOUNTER FOR SCREENING INVOLVING SOCIAL DETERMINANTS OF HEALTH (SDOH): ICD-10-CM

## 2024-01-11 DIAGNOSIS — J45.20 MILD INTERMITTENT ASTHMA WITHOUT COMPLICATION: Primary | ICD-10-CM

## 2024-01-11 PROCEDURE — 99213 OFFICE O/P EST LOW 20 MIN: CPT | Performed by: FAMILY MEDICINE

## 2024-01-11 RX ORDER — ALBUTEROL SULFATE 90 UG/1
2 AEROSOL, METERED RESPIRATORY (INHALATION) EVERY 6 HOURS PRN
Qty: 18 G | Refills: 2 | Status: SHIPPED | OUTPATIENT
Start: 2024-01-11

## 2024-01-11 ASSESSMENT — PAIN SCALES - GENERAL: PAINLEVEL: NO PAIN (0)

## 2024-01-11 NOTE — LETTER
My Asthma Action Plan    Name: Kevin Urena   YOB: 2000  Date: 1/11/2024   My doctor: Maria Dolores Jeter MD   My clinic: [facility]        My Rescue Medicine:   Albuterol inhaler (Proair/Ventolin/Proventil HFA)  2-4 puffs EVERY 4 HOURS as needed. Use a spacer if recommended by your provider.   My Asthma Severity:   Intermittent / Exercise Induced  Know your asthma triggers: upper respiratory infections             GREEN ZONE   Good Control  I feel good  No cough or wheeze  Can work, sleep and play without asthma symptoms       Take your asthma control medicine every day.     If exercise triggers your asthma, take your rescue medication  15 minutes before exercise or sports, and  During exercise if you have asthma symptoms  Spacer to use with inhaler: If you have a spacer, make sure to use it with your inhaler             YELLOW ZONE Getting Worse  I have ANY of these:  I do not feel good  Cough or wheeze  Chest feels tight  Wake up at night   Keep taking your Green Zone medications  Start taking your rescue medicine:  every 20 minutes for up to 1 hour. Then every 4 hours for 24-48 hours.  If you stay in the Yellow Zone for more than 12-24 hours, contact your doctor.  If you do not return to the Green Zone in 12-24 hours or you get worse, start taking your oral steroid medicine if prescribed by your provider.           RED ZONE Medical Alert - Get Help  I have ANY of these:  I feel awful  Medicine is not helping  Breathing getting harder  Trouble walking or talking  Nose opens wide to breathe       Take your rescue medicine NOW  If your provider has prescribed an oral steroid medicine, start taking it NOW  Call your doctor NOW  If you are still in the Red Zone after 20 minutes and you have not reached your doctor:  Take your rescue medicine again and  Call 911 or go to the emergency room right away    See your regular doctor within 2 weeks of an Emergency Room or  Urgent Care visit for follow-up treatment.          Annual Reminders:  Meet with Asthma Educator,  Flu Shot in the Fall, consider Pneumonia Vaccination for patients with asthma (aged 19 and older).    Pharmacy:    [id number] - [place], [place] - [address]  [facility] [address] [facility] - [place], [place] - [address] [facility] - [place], [place] - [address] [place] [address]    Electronically signed by Maria Dolores Jeter MD   Date: 01/11/24                    Asthma Triggers  How To Control Things That Make Your Asthma Worse    Triggers are things that make your asthma worse.  Look at the list below to help you find your triggers and   what you can do about them. You can help prevent asthma flare-ups by staying away from your triggers.      Trigger                                                          What you can do   Cigarette Smoke  Tobacco smoke can make asthma worse. Do not allow smoking in your home, car or around you.  Be sure no one smokes at a child s day care or school.  If you smoke, ask your health care provider for ways to help you quit.  Ask family members to quit too.  Ask your health care provider for a referral to Quit Plan to help you quit smoking, or call 1-447-293-PLAN.     Colds, Flu, Bronchitis  These are common triggers of asthma. Wash your hands often.  Don t touch your eyes, nose or mouth.  Get a flu shot every year.     Dust Mites  These are tiny bugs that live in cloth or carpet. They are too small to see. Wash sheets and blankets in hot water every week.   Encase pillows and mattress in dust mite proof covers.  Avoid having carpet if you can. If you have carpet, vacuum weekly.   Use a dust mask and HEPA vacuum.   Pollen and Outdoor Mold  Some people are allergic to trees, grass, or weed pollen, or molds. Try to keep your windows closed.  Limit time out doors when pollen count is high.   Ask you health care provider about taking medicine during allergy season.     Animal  Dander  Some people are allergic to skin flakes, urine or saliva from pets with fur or feathers. Keep pets with fur or feathers out of your home.    If you can t keep the pet outdoors, then keep the pet out of your bedroom.  Keep the bedroom door closed.  Keep pets off cloth furniture and away from stuffed toys.     Mice, Rats, and Cockroaches  Some people are allergic to the waste from these pests.   Cover food and garbage.  Clean up spills and food crumbs.  Store grease in the refrigerator.   Keep food out of the bedroom.   Indoor Mold  This can be a trigger if your home has high moisture. Fix leaking faucets, pipes, or other sources of water.   Clean moldy surfaces.  Dehumidify basement if it is damp and smelly.   Smoke, Strong Odors, and Sprays  These can reduce air quality. Stay away from strong odors and sprays, such as perfume, powder, hair spray, paints, smoke incense, paint, cleaning products, candles and new carpet.   Exercise or Sports  Some people with asthma have this trigger. Be active!  Ask your doctor about taking medicine before sports or exercise to prevent symptoms.    Warm up for 5-10 minutes before and after sports or exercise.     Other Triggers of Asthma  Cold air:  Cover your nose and mouth with a scarf.  Sometimes laughing or crying can be a trigger.  Some medicines and food can trigger asthma.

## 2024-01-11 NOTE — COMMUNITY RESOURCES LIST (ENGLISH)
01/11/2024   [facility]  N/A  For questions about this resource list or additional care needs, please contact your primary care clinic or care manager.  Phone: [phone]   Email: N/A   Address: [address] [place], [place] [id number]   Hours: N/A        Financial Stability       Rent and mortgage payment assistance  1  [facility] Distance: 1.03 miles      In-Person, Phone/Virtual   [address] [id number]  Language: English  Hours: Mon - Fri 8:00 AM - 4:30 PM  Fees: Free   Phone: (293) 629-1793 Email: servicePHEMI Health Systems@Liquidia TechnologiesKit Carson County Memorial HospitalOLED-T Website: https://www.Lokata.ru/yourDutyCalculator/facilities/service-center-info     2  [facility] - Rent Assistance Distance: 2.36 miles      In-Person   [address] [place] [address]  Language: English  Hours: Mon - Fri 9:00 AM - 5:00 PM  Fees: Free   Phone: (934) 693-5896 Email: info@mai.[employer] Website: http://www.mai.[facility]/     Utility payment assistance  3  [facility] - Low Income Energy Assistance Program (LIHEAP) application assistance Distance: 1.03 miles      In-Person, Phone/Virtual   [address] [place] [address]  Language: English  Hours: Mon - Fri 8:00 AM - 4:30 PM  Fees: Free   Phone: (423) 269-4391 Email: servicePHEMI Health Systems@[employer] Website: https://www.Lokata.ru/your-government/facilities/service-center-info     4  [employer] - ???????Gas Affordability Program Distance: 2.35 miles      Phone/Virtual   [address] [address] [place] [address]  Language: English  Hours: Mon - Sun Open 24 Hours  Fees: Sliding Fee   Phone: (871) 537-5862 Website: http://www.StorSimple/en-us/residential/customer-service/billing-payment/need-help-paying-your-bill?sa=[place]          Food and Nutrition       Food pantry  5  Good in the  [facility] Distance: 0.46 miles      Pickup   [address] [place] [address]  Language: English  Hours: Thu 5:00 PM - 7:00 PM  Fees: Free   Phone: (837) 393-2124 Email: info@[employer] Website: http://www.IPWireless.org/Our-Programs/Feeding-the-Future/Food-in-the-[place]     6  [facility] Food Giveaway Distance: 0.52 miles      In-Person   [address] [place] [address]  Language: English, Bruneian  Hours: Wed - Fri 10:00 AM - 2:00 PM  Fees: Free   Phone: (496) 981-6858 Email: info@Vsnap Website: http://www.Vsnap/     SNAP application assistance  7  [facility] Human Services and Public Health [facility] Distance: 1.03 miles      In-Person, Phone/Virtual   [address] [place] [address]  Language: English  Hours: Mon - Fri 8:00 AM - 4:30 PM  Fees: Free   Phone: (798) 148-3713 Email: servicecenterinfo@[facility] Website: https://www.[facility]/facilities/service-center-info     8  [facility] Distance: 1.73 miles      In-Person, Phone/Virtual   [address] [place] [address]  Language: English, Turkish, South Sudanese  Hours: Mon - Fri 9:00 AM - 5:00 PM  Fees: Free   Phone: (200) 902-8616 Email: awilda@[employer] Website: https://www.Activehours.com/LIFT12.org/     Soup kitchen or free meals  9  [facility] - Community Kitchen Distance: 0.64 miles      In-Person, Pickup   [address] [place] [address]  Language: English  Hours: Mon - Fri 3:00 PM - 9:00 PM , Sat 9:00 AM - 4:00 PM  Fees: Free   Phone: (984) 174-5774 Email: usama@[facility] Website: https://www.[facility].org/parks__destinations/recreation_centers/[facility]_recreation_center/     10  [facility] - [place] [facility]  Distance: 0.78 miles      In-Person   [address] [place] [address]  Language: English  Hours: Mon - Fri 12:30 PM - 1:30 PM  Fees: Free   Phone: (205) 739-8257 Email: josefina@[employer] Website: https://tvnbchurch.com/          Hotlines and Helplines       Hotline - Housing crisis  11  [facility] Distance: 2.58 miles      Phone/Virtual   [address] [place] [address]  Language: English  Hours: Mon - Sun Open 24 Hours  Fees: Free   Phone: (636) 457-1109 Email: info@saintolaf.org Website: http://www.saintolaf.org/     12  [facility] Distance: 3.32 miles      Phone/Virtual   [address] [place] [address]  Language: English  Hours: Mon - Sun Open 24 Hours   Phone: (700) 524-1517 Email: info@Siminars.Fancorps Website: http://www.ThisLife.Fancorps          Housing       Coordinated Entry access point  13  [facility] Connect (ASC) Distance: 1.95 miles      In-Person, Phone/Virtual   [address] [place] [address]  Language: English, Serbian  Hours: Mon - Fri 10:00 AM - 5:30 PM , Mon - Sun 7:30 PM - 10:20 PM , Sat - Sun 1:00 PM - 5:30 PM  Fees: Free   Phone: (155) 372-7742 Email: info@Mys[employer]brands.Fancorps Website: https://www.AJ TechRoger Williams Medical CenterPins.org/our-programs/adult-shelter-connect-[facility]/     14  [facility] - Adult alf Connect [facility] Distance: 2.58 miles      In-Person   [address] [place] [address]  Language: English  Hours: Mon - Sat 10:00 PM - 5:00 PM  Fees: Free   Phone: (368) 843-1390 Email: info@saintolaf.org Website: http://www.saintolaf.org/     Drop-in center or day shelter  15  [facility] Distance: 1.75 miles      In-Person   [address] [place] [address]  Language: English, Hmong, Argentine, Serbian  Hours: Mon - Thu 8:30 AM - 4:30 PM , Sat - Sun 9:00 AM - 12:00 PM  Fees: Free   Phone: (809) 322-5503 Email: info@Harvest TrendsingBirdDogs.org Website:  https://PhoneplusingS5 Techs.org/     16  [facility] - Youth Drop-in Center Distance: 2.2 miles      In-Person   [address] [place] [address]  Language: English, Bangladeshi  Hours: Mon - Sun Open 24 Hours  Fees: Free   Phone: (184) 865-9053 Email: youthlink@youthlinkmn.org Website: http://www.youthlinkmn.org     Housing search assistance  17  [facility] Distance: 1.09 miles      Phone/Virtual   [address] [place] [address]  Language: English  Hours: Mon - Thu 9:00 AM - 5:00 PM  Fees: Free   Phone: (907) 693-9457 Email: info@Audiotoniq.org Website: https://www.Worlize.com/[place].[facility]/     18  [facility] - Human Services - [facility] Housing Supports Distance: 1.11 miles      In-Person, Phone/Virtual   [address] [place] [address]  Language: Vietnamese, English, Vietnamese, Vietnamese Creole, Hmong, Mandarin, Oromo, Norwegian, Bangladeshi  Hours: Mon - Fri 8:30 AM - 5:00 PM  Fees: Free   Phone: (284) 219-3799 Website: http://www.[facility].SmartEquip/human-services     Shelter for families  19  [facility] Distance: 18.18 miles      In-Person   [address] [place] [address]  Language: English  Hours: Mon - Fri 3:00 PM - 9:00 AM , Sat - Sun Open 24 Hours  Fees: Free   Phone: (413) 567-2511 Ext.1 Website: https://www.saintLeCabRoulettes.org/2020/07/03/emergency-family-shelter/     Shelter for individuals  20  [facility] Distance: 2.13 miles      In-Person   [address] [place] [address]  Language: English  Hours: Mon - Fri 4:00 PM - 9:00 AM  Fees: Free   Phone: (662) 399-7057 Email: ge[place]@[facility].org Website: https://centralusa.[facility].org/northern/[facility]/     21  [facility] Distance: 3.62 miles      In-Person   [address] [place] [address]  Language: English  Hours: Mon - Sun Open 24 Hours   Fees: Free   Phone: (681) 858-3282 Email: communications@[facility].org Website: https://[facility].org/oursaviourshousing/     Shelter for youth  22  [facility] - [facility] - Emergency Youth Shelter Distance: 6.57 miles      In-Person   [address] [place] [address]  Language: English  Hours: Mon - Sun Open 24 Hours  Fees: Free   Phone: (232) 932-4963 Email: info@[employer] Technology Website: https://www.MaulSoup.Faraday/locations/[place]/          Transportation       Free or low-cost transportation  23  [facility] - Bus Passes - Free or low-cost transportation Distance: 2.36 miles      In-Person   [address] [place] [address]  Language: English  Hours: Tue 9:30 AM - 11:30 AM , Thu 9:30 AM - 11:30 AM  Fees: Free   Phone: (221) 246-9873 Email: info@[employer] Website: http://Emulate/     24  [facility] Distance: 2.58 miles      In-Person   [address] [place] [address]  Language: English  Hours: Mon - Wed 9:30 AM - 12:00 PM , Mon - Wed 1:00 PM - 2:00 PM Appt. Only  Fees: Free   Phone: (144) 507-3957 Email: info@saintolaf.org Website: http://www.saintolaf.org/     Transportation to medical appointments  25  [facility]   Family Wellness (AIFW) Distance: 4.83 miles      In-Person   [address] [place] [address]  Language: Wolof, Czech, English, Gujarati, Stan, Latvian, Faroese, Turkish, Amharic, Belarusian  Hours: Mon - Wed 9:00 AM - 5:00 PM , Thu 12:00 PM - 6:00 PM , Fri 9:00 AM - 5:00 PM , Sun 10:30 AM - 2:00 PM Appt. Only  Fees: Free   Phone: (464) 197-1780 Email: info@[facility]-aifw.org Website: https://www.sewa-aifw.org/     26  [place] [employer] Distance: 6.05 miles      In-Person   [address] [facility] [address] [place] [address]  Language: English  Hours: Mon - Sat 4:00 AM - 6:00 PM  Fees: Insurance, Self Pay   Phone: (640) 933-8933 Email: delighttransisidoroation1@[employer].Smokazon.com Website:  https://helpmeconnect.web.[facility].us/HelpMeConnect/Providers/Delight_Transportation/Transportation/2?returnUrl=%2FHelpMeConnect%2FSearch%2FBasicNeeds%2FTransportation%2FTransportationServices%3Fstart%3D40          Important Numbers & Websites       Emergency Services   911  [facility] Services   311  Poison Control   (503) 730-2525  Suicide Prevention Lifeline   (503) 815-7388 (TALK)  Child Abuse Hotline   (602) 892-4333 (4-A-Child)  Sexual Assault Hotline   (958) 408-3599 (HOPE)  National Runaway Safeline   (169) 561-2864 (RUNAWAY)  All-Options Talkline   (345) 574-3988  Substance Abuse Referral   (121) 855-2428 (HELP)

## 2024-01-11 NOTE — PROGRESS NOTES
Assessment & Plan     Mild intermittent asthma without complication  Discussed asthma and reviewed AAP.  Reviewed the importance of having a rescue inhaler on hand.    - albuterol (PROAIR HFA/PROVENTIL HFA/VENTOLIN HFA) 108 (90 Base) MCG/ACT inhaler  Dispense: 18 g; Refill: 2  - Asthma Action Plan (AAP)    Postpartum care and examination of lactating mother  - Breast Pump Order for DME - ONLY FOR DME    Encounter for screening involving social determinants of health (SDoH)  Financial/housing/transportation/food concerns noted on screening.  I offered Care Coordination referral and she was agreeable.  - Primary Care - Care Coordination Referral        MED REC REQUIRED  Post Medication Reconciliation Status:  Discharge medications reconciled and changed, see notes/orders    Maria Dolores Jeter MD  [facility]      Crystal Brown is a 23 year old, presenting for the following health issues:  Hospital F/U      1/11/2024     3:00 PM   Additional Questions   Roomed by Kassidy   Accompanied by alone         1/11/2024     3:00 PM   Patient Reported Additional Medications   Patient reports taking the following new medications none       HPI       ED/UC Followup:    Facility[facility] ER  Date of visit: 1/3/24  Reason for visit: chest pain asthma  Current Status: feeling better    From review of records in Care Everywhere:  COVID, influenza and RSV were all negative.    She was discharged with albuterol MDI, Flonase, Augmentin, and Prednisone (40 mg QD x 3 d)    From 1/3/2024 [facility] ER note in Care Everywhere:  MDM: Patient is a 23-year-old female that presents for evaluation of shortness of breath in the context of asthma, and possible viral illness. Vital signs at triage notable for tachycardia and hypertension. Repeat vital signs after DuoNeb shows improvement in hypertension with blood pressure of 124/69, and pulse of 79. After discussion with patient, I do not have any  "suspicion for pulmonary embolism, or pneumonia. I do think that is reasonable to prescribe patient a rescue inhaler, as well as short course of prednisone. We will also prescribe patient nasal steroids to help with congestion. I did use shared decision-making to talk about risks and benefits of antibiotics for potential ear infection as patient is having some ear pain noted on exam. Patient elected to trial antibiotics. I do have strong suspicion that this is viral etiology, but I do think it was reasonable. I did submit a referral to case management, to have her scheduled for follow-up with a primary care provider for asthma evaluation. Did discuss return precautions. Patient endorsed understanding and agreement with this plan. Patient discharged in stable condition.     Since ER visit she has used albuterol a few times but not recently.  Used her mom's nebulizer once.  Finished Augmentin and prednisone.  Ears feel much better.  Still using flonase.    She is nursing her 4-month-old and requests a DME order for a breast pump.      Review of Systems         Objective    /60 (BP Location: Right arm, Patient Position: Sitting, Cuff Size: Adult Small)   Pulse 64   Temp 97.5  F (36.4  C) (Temporal)   Resp 20   Ht 1.575 m (5' 2\")   Wt 46.9 kg (103 lb 8 oz)   SpO2 98%   Breastfeeding Yes   BMI 18.93 kg/m    Body mass index is 18.93 kg/m .  Physical Exam   GENERAL: healthy, alert and no distress  EYES: Eyes grossly normal to inspection, conjunctivae and sclerae normal  HENT: ear canals and TM's normal, oropharynx clear with normal landmarks   NECK: no adenopathy, no asymmetry, masses, or scars and thyroid normal to palpation  RESP: lungs clear to auscultation - no rales, rhonchi or wheezes  CV: regular rate and rhythm, normal S1 S2, no S3 or S4, no murmur, click or rub, no peripheral edema  SKIN: no suspicious lesions or rashes  NEURO: Grossly normal strength and tone, mentation intact and speech " normal  PSYCH: mentation appears normal, affect normal/bright

## 2024-01-11 NOTE — PATIENT INSTRUCTIONS
Medical Supply Stores:  [facility] Medical Supply at [address] [place] [address] [phone]   [facility] Medical Supply at [address], [phone].  [facility] Medical Supply, [facility], [address] [facility], [place], [place] [phone]  [facility] Medical at [address], [phone] (Monay-Friday 8-5:30 and Saturday 9-2)

## 2024-01-12 ENCOUNTER — PATIENT OUTREACH (OUTPATIENT)
Dept: CARE COORDINATION | Facility: CLINIC | Age: 24
End: 2024-01-12
Payer: COMMERCIAL

## 2024-01-12 NOTE — PROGRESS NOTES
Clinic Care Coordination Contact  [facility]/Voicemail    Clinical Data: Care Coordinator Outreach    Outreach Documentation Number of Outreach Attempt   1/12/2024  10:33 AM 1       CHW briefly spoke with patient. Patient states that she is in class and requested to be called back later this afternoon.    Plan: Care Coordinator will try to reach patient after 2pm today.    APRIL Broussard, B.A. [facility] Care Coordination  [facility]:   [facility]  [phone]

## 2024-01-16 ENCOUNTER — TELEPHONE (OUTPATIENT)
Dept: PSYCHIATRY | Facility: CLINIC | Age: 24
End: 2024-01-16
Payer: COMMERCIAL

## 2024-01-16 NOTE — PROGRESS NOTES
Clinic Care Coordination Contact  Community Health Worker Initial Outreach    CHW Initial Information Gathering:  Referral Source: PCP  Preferred Hospital: [facility]  [phone]  Current living arrangement:: Other (pt lives with child and goes back and forth between places)  Supplies Currently Used at Home: None  Equipment Currently Used at Home: none  Informal Support system:: Family  No PCP office visit in Past Year: No  Transportation means:: Public transportation  CHW Additional Questions  If ED/Hospital discharge, follow-up appointment scheduled as recommended?: N/A  Medication changes made following ED/Hospital discharge?: N/A  MyChart active?: Yes  Patient sent Social Determinants of Health questionnaire?: Yes    Patient accepts CC: Yes. Patient scheduled for assessment with BLANCA Jerry on 1/18/24 at 2:00pm. Patient noted desire to discuss housing, transportation, and food resources.     CHW notes:  Patient states that her and child don't have a permanent residence. They go back and forth between living with her parents and living with her child's father. Patient states that it is hard because she doesn't have a place where she can receive mail at.  Patient states that she currently uses public transportation and would like assistance in getting transportation for her schooling.  Patient states she would like food resources, specifically for baby food.    Sarah Smart, REIW, B.A. [facility]  Clinic Care Coordination  [facility]:   [facility]  [phone]

## 2024-01-16 NOTE — TELEPHONE ENCOUNTER
This writer called to inquire if Kevin is still able to meet today at 4 pm as we planned. Encouraged Kevin to give me a call back, let me know how she is doing, and reschedule our appointment if needed. Because Kevin's vm box was full I was unableto leave a vm, and sent a text via Phone.com.       Luisa Flores, PhD, LP  , Dept of Psychiatry   Women's Wellbeing Program  Phone.Apptentive Office Line: [phone]

## 2024-01-17 ENCOUNTER — VIRTUAL VISIT (OUTPATIENT)
Dept: PSYCHIATRY | Facility: CLINIC | Age: 24
End: 2024-01-17
Payer: COMMERCIAL

## 2024-01-17 DIAGNOSIS — Z91.51 HISTORY OF SUICIDAL BEHAVIOR: ICD-10-CM

## 2024-01-17 DIAGNOSIS — F33.2 SEVERE EPISODE OF RECURRENT MAJOR DEPRESSIVE DISORDER, WITHOUT PSYCHOTIC FEATURES (H): Primary | ICD-10-CM

## 2024-01-17 PROCEDURE — 90832 PSYTX W PT 30 MINUTES: CPT | Mod: 95

## 2024-01-18 ENCOUNTER — PATIENT OUTREACH (OUTPATIENT)
Dept: FAMILY MEDICINE | Facility: CLINIC | Age: 24
End: 2024-01-18
Payer: COMMERCIAL

## 2024-01-18 ENCOUNTER — PATIENT OUTREACH (OUTPATIENT)
Dept: CARE COORDINATION | Facility: CLINIC | Age: 24
End: 2024-01-18

## 2024-01-18 DIAGNOSIS — Z71.89 OTHER SPECIFIED COUNSELING: Primary | Chronic | ICD-10-CM

## 2024-01-18 ASSESSMENT — ACTIVITIES OF DAILY LIVING (ADL): DEPENDENT_IADLS:: INDEPENDENT

## 2024-01-18 NOTE — LETTER
[facility] CARE COORDINATION  No address on file    January 19, 2024    Kevin Urena  [address] [place] [id number]      Dear Kevin,    I am a clinic care coordinator who works with Physician Yana Ref-Primary with the [facility]. I wanted to thank you for spending the time to talk with me.  Below is a description of clinic care coordination and how I can further assist you.       The clinic care coordination team is made up of a registered nurse, , financial resource worker and community health worker who understand the health care system. The goal of clinic care coordination is to help you manage your health and improve access to the health care system. Our team works alongside your provider to assist you in determining your health and social needs. We can help you obtain health care and community resources, providing you with necessary information and education. We can work with you through any barriers and develop a care plan that helps coordinate and strengthen the communication between you and your care team.  Our services are voluntary and are offered without charge to you personally.    Please feel free to contact me with any questions or concerns regarding care coordination and what we can offer.      We are focused on providing you with the highest-quality healthcare experience possible.    Sincerely,     Claritza Casillas, [facility]  Social Work Care Cooridinator   [facility]   Phone: [phone]

## 2024-01-19 NOTE — PROGRESS NOTES
Clinic Care Coordination Contact  Clinic Care Coordination Contact  OUTREACH    Referral Information:  Referral Source: PCP    Primary Diagnosis: Psychosocial     Spoke with patient for schedule visit. Patient would like assistance with applying [facility] benefits. Patient noted application would be for just for her and her son. No other needs identified at this time.       Plan: Patient will engage with W for [facility] benefits. No other needs identified so no further [facility] outreach at this time.       Claritza Casillas [facility]  Social Work Care [facility]   Phone: [phone]

## 2024-01-23 ENCOUNTER — VIRTUAL VISIT (OUTPATIENT)
Dept: PSYCHIATRY | Facility: CLINIC | Age: 24
End: 2024-01-23
Payer: COMMERCIAL

## 2024-01-23 DIAGNOSIS — F33.2 SEVERE EPISODE OF RECURRENT MAJOR DEPRESSIVE DISORDER, WITHOUT PSYCHOTIC FEATURES (H): Primary | ICD-10-CM

## 2024-01-23 DIAGNOSIS — Z91.51 HISTORY OF SUICIDAL BEHAVIOR: ICD-10-CM

## 2024-01-23 PROCEDURE — 90834 PSYTX W PT 45 MINUTES: CPT | Mod: 95

## 2024-01-23 NOTE — PROGRESS NOTES
WOMEN'S WELLBEING PROGRAM  OUTPATIENT PSYCHOTHERAPY PROGRESS NOTE     Client Name: Kevin Urena    YOB: 2000 (23 year old)          Date of Service:  Jan 17, 2024  Time of Service: 4:15 pm to 4:38 pm (23 minutes)  Service Type(s): 22289 psychotherapy (16-37 min. with patient and/or family)  Type of service: Telemedicine Psychotherapy   Reason for Telemedicine Visit: transportation barriers, patient preference, to foster engagement  Mode of transmission: Secure real time interactive audio and visual telecommunication system ([employer])  Location of originating and distant sites:  ? Originating site (patient location): patient's home   ? Distant site (provider site): HIPAA compliant location within remote setting  Telemedicine Visit: The patient's condition can be safely assessed and treated via synchronous audio and visual telemedicine encounter. Patient/family has agreed to receive services via telemedicine technology.     Diagnoses:     Major depressive disorder, recurrent, severe, without psychotic features, with anxious distress (F33.2)    History of suicidal ideation and behavior (Z91.51)     Individuals Present:   Patient and provider      Treatment goal(s) being addressed:   1. Continue to strengthen therapeutic rapport given break between initial appointment (8/2/23), recently re-establishing care (11/27), and intermittent engagement   2. Continue to assess current concerns/treatment needs in context of Kevin's recent discharge from [facility] program and transition/step-down to outpatient care with this writer   3. Support harm reduction approach to substance use, and continue to leverage MI to enhance change talk and explore treatment goals and options with Kevin (e.g., Kymberly)    4. Support Kevin's use of safety plan and resources in response to any return or worsening of suicidal ideation, and continue to explore treatment options for higher-level of care as  "needed (e.g., return to [facility] program)      Subjective:  Kevin is currently approximately 19 weeks postpartum. Today Kevin described her mood as \"pretty good.\" She denied any passive suicidal ideation or intent or plan to harm or kill herself since our last appointment. Kevin shared she and Kervin have returned to live at apartment she shares with her partner/jessica. She reported feeling \"good\" about this, and has continued to avoid substance use and reduce alcohol use (only 1 use since last appointment), even as jessica has continued substance/achol use. Kevin reported he has been \"more supportive\" of her efforts to reduce and avoid use, which has felt \"helpful.\" She continued to endorse positive benefit of avoidance of substance use and reduced alcohol use on mood.  She is continuing with GED program through [employer], which has been \"hard\" while managing care of son during school day. She is still waiting to hear back about  application. Kevin noted significant pain from 2 identified cavities. She is managing pain with Tylenol and is scheduled to have them filled next week. Kevin confirmed she was able to access diapers for Kervin through resources we reviewed last week.        Kevin is not currently taking any psychiatric medication. She is rescheduled to meet with RAPHAEL Lewis CNP, on , to establish psychiatric care/explore psychiatric medication options.      Treatment:   Individual psychotherapy with cognitive behavioral therapy (CBT) framework leveraging therapeutic techniques from  Strong Roots Dialectical Behavioral Therapy (SR-DBT), an evidence-based intervention to target mood dysregulation and interpersonal effectiveness concerns and promote relational health in parent-child dyad. Today, began with continuing to assess for any suicidal ideation and any safety concerns. Continued to use MI to facilitate harm reduction " approach by providing psychoeducation and exploring, eliciting, and enhancing change talk to support reduced alcohol and substance use, and connect reduced use to benefit to mood, functioning, relationships, etc. Continued to provide coaching and positive reinforcement to support use of behavioral activation and social support to target, reduce, and manage low mood. Confirmed upcoming appointment with Brittny Jimenez, discussed clinic attendance policies, and explored/brainstormed any factors that may support Kevin in attending appointments or canceling in advance (instead of no showing). Checked in on need for resources (diapers, etc.). Continued to discuss this writer's upcoming parental leave, and discussed option for Kevin to work with THERESA Crouch, [facility], while this writer is on leave. Kevin was excited for this option for ongoing support. Confirmed plan to transition care around mid February. Throughout appointment provided validation, modeled nonjudgmental stance and curiosity in Kevin's thoughts/feelings/expeirences, and leveraged lens of cultural humility. At Kevin's request we ended appointment early, as she felt tired in context of managing recent cavity pain that has been interrupting sleep.     Assessment and Progress:   Appearance: Appropriately dressed and groomed   Behavior/relationship to examiner/demeanor: open, honest, responsive   Motor activity/EPS: Within normal limits  Speech rate:  Within normal limits  Speech volume:  Within normal limits  Speech articulation:  Within normal limits  Speech coherence:  Within normal limits  Speech spontaneity:  Within normal limit  Mood (subjective report): appeared fatigued with subdued mood relative to most recent appointment, within normal limits     Affect (objective appearance): Within normal limits   Thought Process (Associations): Goal directed  Thought process (Rate):  Within normal limits  Thought content: None  Abnormal  Perception: None  Insight:  Within normal limits  Judgment:  Within normal limits     I believe Kevin is benefiting from recently re-establishing and re-engaging in regular outpatient therapy. Recently we have discussed the option to leverage resources for higher level of care/support, including returning to [facility], or considering treatment and support through [facility]. Currently, Kevin is not open/interested in engaging in a higher level of support,as she does not feel they are needed, but expressed openness to reconsidering these options should she feel her needs change. She confirmed she remains motivated to continue to engage in outpatient psychotherapy to target symptoms of depression. In our last few appointments, Kevin has expressed insight that a harm reduction approach to substance/achol use has been beneficial to mood and management of suicidal ideation/behaviors. Kevin has reported her relationships with her mother and fiance have a history of instability with a significant impact on her mood and experience of suicidal ideation. As these relationships are her two major sources of social support, we continue to develop and enhance Kevin's ability to practice healthy emotional boundaries and develop/leverage safety/stability from other social relationships in her life. Given history of suicidal ideation/behaivors we continue to discuss importance of use of safety plan, resources, and coping strategies if suicidal ideation returns/worsens.       Plan:   Continue with regular therapy. Today we confirmed plan for Kevin to transition to working [facility] while this writer is on leave, with approximately timeline of transfer planned for mid February.      Recently we dicussed diaper resources for parents that Kevin has leveraged, including:     [email or web address]    [email or web address]      If suicidal  ideation returns or worsens, follow safety plan we developed including the following resources and coping strategies:      1. Continue to reduce/avoid alcohol use as Kevin is aware this is a factor that can worsen SI.   2. Call 911 or present directly to ED with active intent or plan. This is a psychiatric emergency and you are deserving of support! Recommended [facility] APS Unit or [facility]'s EmPATH Unit given Kevin's previous negative ED experience at [facility] following a suicide attempt.   3. Use STOP or Soothe with the 6 Senses Coping Strategies to help tolerate distress on the way to ED      4. Use Crisis Support Lines for support when suicidal ideation is passive and does not involve active intent or plan:        Recently we have discussed resources for higher level of support, including:     [facility]:  [email or web address]    [facility]: [email or web address]  At this time Kevin is not open/interested in a higher level of support as she does not feel it is needed. She is aware of these options and open to considering them in the future if she feels her need for support changes.       Today we discussed clinic attendance policies, and explored strategies to support Kevin's ability to attend or cancel appointments in advance (e.g., use of alarm, reminder, etc.). Kevin is rescheduled to meet with RAPHAEL Lewis, CNP, 2/1. To reschedule, call [facility] psychiatry clinic at: [phone]     Next therapy appointment has been scheduled for 1/23/24 at 4 pm to continue work on treatment goals.     Treatment Plan review due: upcoming appointment      Luisa Flores, PhD, LP  , Dept of Psychiatry

## 2024-01-23 NOTE — PROGRESS NOTES
WOMEN'S WELLBEING PROGRAM  OUTPATIENT PSYCHOTHERAPY PROGRESS NOTE     Client Name: Kevin Urena    YOB: 2000 (23 year old)          Date of Service:  Jan 23, 2024  Time of Service: 4:11 pm to 4:59 pm (48 minutes)  Service Type(s): 00740 psychotherapy (38-52 min. with patient and/or family)  Type of service: Telemedicine Psychotherapy   Reason for Telemedicine Visit: transportation barriers, patient preference, to foster engagement  Mode of transmission: Secure real time interactive audio and visual telecommunication system ([employer]; transition to phone at end of appointment due to disconnection/technical difficulties)  Location of originating and distant sites:  ? Originating site (patient location): patient's mother's home   ? Distant site (provider site): HIPAA compliant location within remote setting  Telemedicine Visit: The patient's condition can be safely assessed and treated via synchronous audio and visual telemedicine encounter. Patient/family has agreed to receive services via telemedicine technology.     Diagnoses:     Major depressive disorder, recurrent, severe, without psychotic features, with anxious distress (F33.2)    History of suicidal ideation and behavior (Z91.51)     Individuals Present:   Patient presented with infant son     Treatment goal(s) being addressed:   1. Continue to strengthen therapeutic rapport given break between initial appointment (8/2/23), recently re-establishing care (11/27), and intermittent engagement   2. Continue to assess current concerns/treatment needs in context of Kevin's recent discharge from [facility] program and transition/step-down to outpatient care with this writer   3. Support harm reduction approach to substance use, and continue to leverage MI to enhance change talk and explore treatment goals and options with Kevin (e.g., [facility])    4. [facility] use of safety plan and resources in response to any  "return or worsening of suicidal ideation, and continue to explore treatment options for higher-level of care as needed (e.g., return to [facility] program)      Subjective:  Kevin is currently approximately 20 weeks postpartum. Today Kevin described her mood as \"ok, pretty good.\" She denied any passive suicidal ideation or intent or plan to harm or kill herself since our last appointment. Kevin reported she has continued to reduce/avoid substance and alcohol use (she denied use since last appointment). She continued to endorse positive benefit of no substance/alcohol use on mood.  She is continuing with [employer] program through [employer], which has been \"hard\" while managing care of son during school day. She noted, \"I'm trying to stay motivated.\" She is still waiting to hear back about  application. Kevin noted ongoing pain from 2 identified cavities. She is continuing to manage pain with Tylenol and is scheduled to have them filled tomorrow. On  she will present to court for trial date related to charges of vandalism/break in/theft. She reported charges were brought after her ex-fiance stole her cousin's playstation, and although they are both charged he has refused to appear in court and as a result she feels she is being treated as a \"scape goat.\" If convicted, Kevin may face [employer] time of up to 3 years. She reported she feels hopeful she will not be convicted, and denied an impact of upcoming court date on mood/anxiety.      Kevin is not currently taking any psychiatric medication. She is rescheduled to meet with RAPHAEL Lewis, CNP, on , to establish psychiatric care/explore psychiatric medication options.      Treatment:   Individual psychotherapy with cognitive behavioral therapy (CBT) framework leveraging therapeutic techniques from  Strong Roots Dialectical Behavioral Therapy (SR-DBT), an evidence-based intervention to target " "mood dysregulation and interpersonal effectiveness concerns and promote relational health in parent-child dyad. Today, ongoing assessment for any suicidal ideation and any safety concerns. Continued to leverage MI to facilitate harm reduction approach by exploring, eliciting, and enhancing change talk to support reduced alcohol and substance use, and connect ongoing reduced use to benefit to mood, functioning, relationships, etc. Provided ongoing coaching and positive reinforcement to recognize success in harm reduction, and support use of behavioral activation and social support to target, reduce, and manage low mood. Supported Kevin in holding space for thoughts and feelings related to upcoming court date and impact on life (e.g., \"kicked out\" of [employer]), exploring resources, and offering support. Discussed recent conflict with mother and strategies for practicing helpful/healthy thoughts, healthy emotional/social boundaries, and effective interpersonal communication strategies to support Kevin's mood and functioning. Provided and reviewed psychoeducation on infant development and parent-infant relational health drawn from SR-DBT, and leveraged facilitative approach to recognize and enhance Kevin's parenting strengths related to parent warmth, attunement to infant cues, and sensitive parent response to infant cues/needs. Throughout appointment provided validation, modeled nonjudgmental stance and curiosity in Kevin's thoughts/feelings/expeirences, and leveraged lens of cultural humility.      Assessment and Progress:   Appearance: Appropriately dressed and groomed   Behavior/relationship to examiner/demeanor: open, honest, responsive   Motor activity/EPS: Within normal limits  Speech rate:  Within normal limits  Speech volume:  Within normal limits  Speech articulation:  Within normal limits  Speech coherence:  Within normal limits  Speech spontaneity:  Within normal limit  Mood (subjective " report): Within normal limits     Affect (objective appearance): Within normal limits   Thought Process (Associations): Goal directed  Thought process (Rate):  Within normal limits  Thought content: None  Abnormal Perception: None  Insight:  Within normal limits  Judgment:  Within normal limits     I believe Kevin is benefiting from recently re-establishing and re-engaging in regular outpatient therapy. Recently we explored resources for higher level of care/support, including returning to [facility], or considering treatment and support through [facility]. At that time, Kevin was not open/interested in engaging in a higher level of support,as she did not feel they are needed, but expressed openness to reconsider these options should she feel her needs change. Kevin confirmed she remains motivated to continue to engage in outpatient psychotherapy to target symptoms of depression. In our last few appointments, Kevin has continued to express insight that a harm reduction approach to substance/achol use has been beneficial to her mood and the management of suicidal ideation/behaviors. Kevin has reported her relationships with her mother and fiance have a history of instability with a significant impact on her mood and experience of suicidal ideation. As these relationships are her two major sources of social support, we continue to develop and enhance Kevin's ability to use coping strategies to manage relational stress, practice healthy emotional boundaries and effective interpersonal communication strategies, and develop/leverage safety/stability from other social relationships in her life. Given history of suicidal ideation/behaivors we continue to discuss importance of use of safety plan, resources, and coping strategies if suicidal ideation returns/worsens.       Plan:   Continue with regular therapy. Recently we have confirmed plan for Kevin to transition to  working with THERESA Crouch, LICSW, during this writer's upcoming parental leave. Transfer of care planned for mid February.      Recently we dicussed diaper resources for parents that Kevin has leveraged, including:     [email or web address]    [email or web address]      If suicidal ideation returns or worsens, follow safety plan we developed including the following resources and coping strategies:      1. Continue to reduce/avoid alcohol use as Kevin is aware this is a factor that can worsen SI.   2. Call 911 or present directly to ED with active intent or plan. This is a psychiatric emergency and you are deserving of support! Recommended [facility] APS Unit or [facility]'s EmPATH Unit given Kevin's previous negative ED experience at [facility] following a suicide attempt.   3. Use STOP or Soothe with the 6 Senses Coping Strategies to help tolerate distress on the way to ED      4. Use Crisis Support Lines for support when suicidal ideation is passive and does not involve active intent or plan:        Recently we have discussed resources for higher level of support, including:     [facility]:  [email or web address]    [facility]: [email or web address]  At this time Kevin is not open/interested in a higher level of support as she does not feel it is needed. She is aware of these options and open to considering them in the future if she feels her need for support changes.       Today we discussed clinic attendance policies, and explored strategies to support Kevin's ability to attend or cancel appointments in advance (e.g., use of alarm, reminder, etc.). Kevin is rescheduled to meet with RAPHAEL Lewis, CNP, 2/1. To reschedule, call [facility] psychiatry clinic at: [phone]     Next therapy appointment has been scheduled for 1/30/24 at 4 pm to continue work  on treatment goals.     Treatment Plan review due: upcoming appointment      Luisa Flores, PhD, LP  , Dept of Psychiatry

## 2024-01-29 ENCOUNTER — VIRTUAL VISIT (OUTPATIENT)
Dept: PSYCHIATRY | Facility: CLINIC | Age: 24
End: 2024-01-29
Payer: COMMERCIAL

## 2024-01-29 DIAGNOSIS — F33.2 SEVERE EPISODE OF RECURRENT MAJOR DEPRESSIVE DISORDER, WITHOUT PSYCHOTIC FEATURES (H): Primary | ICD-10-CM

## 2024-01-29 DIAGNOSIS — Z91.51 HISTORY OF SUICIDAL BEHAVIOR: ICD-10-CM

## 2024-01-29 PROCEDURE — 90837 PSYTX W PT 60 MINUTES: CPT | Mod: 95

## 2024-01-29 NOTE — PROGRESS NOTES
[facility]  OUTPATIENT PSYCHOTHERAPY PROGRESS NOTE    Client Name: Kevin Urena   YOB: 2000 (23 year old)   Date of Service:  Jan 29, 2024  Time of Service: 4:30 pm to 5:27 pm (57 minutes)  Service Type(s): 98747 psychotherapy (53-60 min. with patient and/or family)  Type of service: Telemedicine Psychotherapy   Reason for Telemedicine Visit: transportation barriers, patient preference, to foster engagement  Mode of transmission: Secure real time [employer] audio and visual telecommunication system ([employer])  Location of originating and distant sites:    Originating site (patient location): patient home    Distant site (provider site): HIPAA compliant location within provider home/remote setting  Telemedicine Visit: The patient's condition can be safely assessed and treated via synchronous audio and visual telemedicine encounter. Patient/family has agreed to receive services via telemedicine technology.    Diagnoses:     Major depressive disorder, recurrent, severe, without psychotic features, with anxious distress (F33.2)    History of suicidal ideation and behavior (Z91.51)    Individuals Present:   Patient presented alone      Treatment goal(s) being addressed:   1. Continue to develop and enhance therapeutic rapport given intermittent engagement in treatment following break between initial appointment (8/2/23) and recently re-establishing care (11/27)   2. Continue to support harm reduction approach to substance use, leverage MI to elicit and enhance change talk, and explore treatment goals and options with Kevin (e.g., [facility])    4. Continue to support Kevin's use of safety plan and resources in response to any return or worsening of suicidal ideation, and continue to explore treatment options for higher-level of care as needed (e.g., return to [facility] program)     Subjective:  Kevin is currently approximately 21 weeks postpartum. Today  "Kevin endorsed acute stress earlier today and described her current mood as \"ok, fine,\" stating, \"I feel fine now.\" She denied any passive suicidal ideation or intent or plan to harm or kill herself. Kevin reported she reported to [facility] this morning as scheduled for trial date related to charges of vandalism/break in/theft. She had previously requested change in assigned  after  texted Kevin, \"I hear you have been bad mouthing me\". Kevin felt this showed  had been unprofessional and would not appropriately represent her. Kevin reported after this occurred she left a message with assigned 's supervisor communicating her request to change  but had not heard back from supervisor. When Kevin arrived at court this morning she did not wish to work with assigned  and communicated desire to court to represent herself. Kevin reported  responding by saying, \"You can't represent yourself, you don't even know the 5 preambles.\" Kevin felt  was condescending and racist, and perceived alliance between assigned  and  (\"they were like best friends\").  instructed Kevin she would have to keep her representation and instructed court to reconveine at 1:30 pm. Maite experienced acute distress, reporting, \"it felt like everything was coming at me all at once,\" and she did not return to court. She received a call this afternoon notifying her there is now a warrant out for her arrest. Additionally, Kevin received information from [employer] that she cannot pursue childcare without a , and believes she has been assigned one in the past but is unsure how to secure one now. Finally, Kevin reported she purchased three kittens and received notification from her landlord that pest control let him know of their presence, that he is coming to do an inspection, and that because she is " "not allowed to have pets she could be evicted. Kevin shared that with all that has been going on she has been exploring a plan to travel to [place] with fiance and son and live with his family. She reported she feels optimistic and hopeful \"life can be better for us there.\"         Kevin reported she has continued to reduce/avoid substance and alcohol use since our last appointment.       Kevin is not currently taking any psychiatric medication. She is rescheduled to meet with RAPHAEL Lewis, CNP, on 2/1, to establish psychiatric care/explore psychiatric medication options.     Treatment:   Individual psychotherapy with cognitive behavioral therapy (CBT) framework leveraging solution-oriented approach. Today, began by recognizing multiple stressors and supporting Kevin in holding space for processing thoughts and feelings related to managing distress. Continued to assess for any suicidal ideation or safety concerns. Explored use of distress tolerance coping strategies as part of definition of coping (getting through crisis or difficult situation without making it worse). Focus on use of DBT STOP skill, relaxation strategies, leveraging social support, rest/sleep. Solution-oriented approach used to support Kevin in identifying goals and brainstorming resources. Kevin identified goal to remain with son and avoid separation. She identified option to turn herself in at 1 am on Sunday night, which in the past has allowed her to limit time held and be seen in court the next morning at 9 am (versus going in during the day and being held all day/overnight, potentially over the weekend, or until next available court date). Discussed resources for childcare if she proceeds with this plan (e.g., asking partner to take day off of work, asking mother for childcare coverage). Discussed Kevin's concerns of possibly being detained [place], returned to [place], and being held \"for perpetuity\" until court " "date if she is identified as being \"at a flight risk.\" Offered/explored option for this writer to submit case management referral request on Kevin's behalf to support access to childcare, or receive support in accessing other financial resources to pay for childcare. Kevin articulated desire/permission for this writer to proceed with submitting this request. Explored option to request emotional service animal letter from Brittny Jimenez at upcoming psychiatry appointment, or have kittens stay with mother temporarily until another solution can be identified. Continued to leverage MI to facilitate harm reduction approach by exploring, eliciting, and enhancing change talk to support reduced alcohol and substance use, and connect ongoing reduced use to benefit to mood, functioning, relationships, etc. Provided ongoing coaching and positive reinforcement to recognize success in harm reduction, and support use of behavioral activation and social support to target, reduce, and manage low mood. Throughout appointment provided validation, modeled nonjudgmental stance and curiosity in Kevin's thoughts/feelings/expeirences, and leveraged lens of cultural humility.     Assessment and Progress:   Appearance: Appropriately dressed and groomed   Behavior/relationship to examiner/demeanor: open, honest, responsive   Motor activity/EPS: Within normal limits  Speech rate:  Within normal limits  Speech volume:  Within normal limits  Speech articulation:  Within normal limits  Speech coherence:  Within normal limits  Speech spontaneity:  Within normal limit  Mood (subjective report): \"fine now\", endorsed acute distress earlier today; denied SI   Affect (objective appearance): Within normal limits   Thought Process (Associations): Goal directed  Thought process (Rate):  Within normal limits  Thought content: None  Abnormal Perception: None  Insight:  Within normal limits  Judgment:  Within normal limits     I believe Kevin " is benefiting from recently re-establishing and re-engaging in regular outpatient therapy. We recently explored resources for higher level of care/support, including returning to [facility], or considering treatment and support through [facility]. At that time, Kevin was not open/interested in engaging in a higher level of support,as she did not feel they are needed, but expressed openness to reconsider these options should she feel her needs change. Kevin remains motivated to continue to engage in outpatient psychotherapy to target symptoms of depression. In our last few appointments, Kevin has continued to express insight that a harm reduction approach to substance/achol use has been beneficial to her mood and the management of suicidal ideation/behaviors. Kevin has reported her relationships with her mother and fiance have a history of instability with a significant impact on her mood and experience of suicidal ideation. As these relationships are her two major sources of social support, we continue to develop and enhance Kevin's ability to use coping strategies to manage relational stress, practice healthy emotional boundaries and effective interpersonal communication strategies, and develop/leverage safety/stability from other social relationships in her life. Given history of suicidal ideation/behaivors we continue to discuss importance of use of safety plan, resources, and coping strategies if suicidal ideation returns/worsens. Today we explored benefit of connecting Kevin to social and financial resources, and made a plan to submit a request for case management and review financial resources to pay for childcare.        Plan:   Continue with regular therapy. Recently we have confirmed plan for Kevin to transition to [facility] during this writer's upcoming parental leave. Transfer of care planned for mid February.       Recently we dicussed diaper resources for parents that Kevin has leveraged, including:     Https://www.[email or web address]    [email or web address]     Today, with Kevin's permission and at her request we discussed plan for this writer to submit a request for case management services. Kevin provided verbal permission for this writer to share copy of DA and fill out application. She agreed to sign JAZZ that was shared with her to formally document permission to share this health information with [facility]. This writer submitted case management request on 1/30.     Today we began exploring financial resources to pay for childcare, including the following:     Https://QU[employer] SOLUTIONSneHealPays.[place].gov    https://www.thinksmall.org/parents/       If suicidal ideation returns or worsens, follow safety plan we developed including the following resources and coping strategies:      1. Continue to reduce/avoid alcohol use as Kevin is aware this is a factor that can worsen SI.   2. Call 911 or present directly to ED with active intent or plan. This is a psychiatric emergency and you are deserving of support! Recommended [facility] APS Unit or [facility]'s EmPATH Unit given Kevin's previous negative ED experience at [facility] following a suicide attempt.   3. Use STOP or Soothe with the 6 Senses Coping Strategies to help tolerate distress on the way to ED      4. Use Crisis Support Lines for support when suicidal ideation is passive and does not involve active intent or plan:        Recently we have discussed resources for higher level of support, including:     [facility]:  [email or web address] [email or web address]/services/[facility]/    [facility]: https://www.[email or web address]/  At this time Kevin is not open/interested in a higher level of support as she does not feel it is needed. She is aware of these options and open to considering them  in the future if she feels her need for support changes.       Recently we discussed clinic attendance policies, and explored strategies to support Kevin's ability to attend or cancel appointments in advance (e.g., use of alarm, reminder, etc.). Kevin is rescheduled to meet with RAPHAEL Lewis, CNP, 2/1. To reschedule, call [facility] psychiatry clinic at: [phone]    Treatment Plan review due: upcoming appointment      Luisa Flores, PhD, LP  Licensed Clinical Psychologist   , Dept of Psychiatry

## 2024-02-01 ENCOUNTER — VIRTUAL VISIT (OUTPATIENT)
Dept: PSYCHIATRY | Facility: CLINIC | Age: 24
End: 2024-02-01
Payer: COMMERCIAL

## 2024-02-01 DIAGNOSIS — F33.1 MDD (MAJOR DEPRESSIVE DISORDER), RECURRENT EPISODE, MODERATE (H): Primary | ICD-10-CM

## 2024-02-01 DIAGNOSIS — F41.1 GENERALIZED ANXIETY DISORDER: ICD-10-CM

## 2024-02-01 PROCEDURE — 99215 OFFICE O/P EST HI 40 MIN: CPT | Mod: 95

## 2024-02-01 PROCEDURE — 99417 PROLNG OP E/M EACH 15 MIN: CPT | Mod: 95

## 2024-02-01 ASSESSMENT — ANXIETY QUESTIONNAIRES
4. TROUBLE RELAXING: MORE THAN HALF THE DAYS
1. FEELING NERVOUS, ANXIOUS, OR ON EDGE: MORE THAN HALF THE DAYS
7. FEELING AFRAID AS IF SOMETHING AWFUL MIGHT HAPPEN: NOT AT ALL
8. IF YOU CHECKED OFF ANY PROBLEMS, HOW DIFFICULT HAVE THESE MADE IT FOR YOU TO DO YOUR WORK, TAKE CARE OF THINGS AT HOME, OR GET ALONG WITH OTHER PEOPLE?: VERY DIFFICULT
GAD7 TOTAL SCORE: 13
GAD7 TOTAL SCORE: 13
7. FEELING AFRAID AS IF SOMETHING AWFUL MIGHT HAPPEN: NOT AT ALL
GAD7 TOTAL SCORE: 13
1. FEELING NERVOUS, ANXIOUS, OR ON EDGE: MORE THAN HALF THE DAYS
GAD7 TOTAL SCORE: 13
GAD7 TOTAL SCORE: 13
5. BEING SO RESTLESS THAT IT IS HARD TO SIT STILL: MORE THAN HALF THE DAYS
IF YOU CHECKED OFF ANY PROBLEMS ON THIS QUESTIONNAIRE, HOW DIFFICULT HAVE THESE PROBLEMS MADE IT FOR YOU TO DO YOUR WORK, TAKE CARE OF THINGS AT HOME, OR GET ALONG WITH OTHER PEOPLE: VERY DIFFICULT
2. NOT BEING ABLE TO STOP OR CONTROL WORRYING: MORE THAN HALF THE DAYS
3. WORRYING TOO MUCH ABOUT DIFFERENT THINGS: MORE THAN HALF THE DAYS
7. FEELING AFRAID AS IF SOMETHING AWFUL MIGHT HAPPEN: NOT AT ALL
8. IF YOU CHECKED OFF ANY PROBLEMS, HOW DIFFICULT HAVE THESE MADE IT FOR YOU TO DO YOUR WORK, TAKE CARE OF THINGS AT HOME, OR GET ALONG WITH OTHER PEOPLE?: VERY DIFFICULT
5. BEING SO RESTLESS THAT IT IS HARD TO SIT STILL: MORE THAN HALF THE DAYS
GAD7 TOTAL SCORE: 13
4. TROUBLE RELAXING: MORE THAN HALF THE DAYS
6. BECOMING EASILY ANNOYED OR IRRITABLE: NEARLY EVERY DAY
6. BECOMING EASILY ANNOYED OR IRRITABLE: NEARLY EVERY DAY
3. WORRYING TOO MUCH ABOUT DIFFERENT THINGS: MORE THAN HALF THE DAYS
IF YOU CHECKED OFF ANY PROBLEMS ON THIS QUESTIONNAIRE, HOW DIFFICULT HAVE THESE PROBLEMS MADE IT FOR YOU TO DO YOUR WORK, TAKE CARE OF THINGS AT HOME, OR GET ALONG WITH OTHER PEOPLE: VERY DIFFICULT
2. NOT BEING ABLE TO STOP OR CONTROL WORRYING: MORE THAN HALF THE DAYS
7. FEELING AFRAID AS IF SOMETHING AWFUL MIGHT HAPPEN: NOT AT ALL

## 2024-02-01 ASSESSMENT — PATIENT HEALTH QUESTIONNAIRE - PHQ9
10. IF YOU CHECKED OFF ANY PROBLEMS, HOW DIFFICULT HAVE THESE PROBLEMS MADE IT FOR YOU TO DO YOUR WORK, TAKE CARE OF THINGS AT HOME, OR GET ALONG WITH OTHER PEOPLE: VERY DIFFICULT
SUM OF ALL RESPONSES TO PHQ QUESTIONS 1-9: 13
SUM OF ALL RESPONSES TO PHQ QUESTIONS 1-9: 13

## 2024-02-01 ASSESSMENT — PAIN SCALES - GENERAL: PAINLEVEL: NO PAIN (0)

## 2024-02-01 NOTE — PROGRESS NOTES
Virtual Visit Details    Type of service:  Video Visit   Video Start Time:  1:59PM  Video End Time: 3:06PM    Originating Location (pt. Location): Home    Distant Location (provider location):  Off-site  Platform used for Video Visit: Deepak    Unable to edit existing provider note, new note created

## 2024-02-01 NOTE — PATIENT INSTRUCTIONS
Good to meet you today, Kevin.    I've sent you a [employer] message with resources to review regarding use of Lexapro while breastfeeding. When we meet next week we can continue to discuss.    The PARMINDER letter has been released to you via [employer].    Brittny Wise     **For crisis resources, please see the information at the end of this document**   Patient Education    Thank you for coming to the [facility] [place] [facility].     Lab Testing:  If you had lab testing today and your results are reassuring or normal they will be mailed to you or sent through [employer]Pix within 7 days. If the lab tests need quick action we will call you with the results. The phone number we will call with results is # [phone]. If this is not the best number please call our clinic and change the number.     Medication Refills:  If you need any refills please call your pharmacy and they will contact us. Our fax number for refills is [phone].   Three business days of notice are needed for general medication refill requests.   Five business days of notice are needed for controlled substance refill requests.   If you need to change to a different pharmacy, please contact the new pharmacy directly. The new pharmacy will help you get your medications transferred.     Contact Us:  Please call [phone] during business hours (8-5:00 M-F).   If you have medication related questions after clinic hours, or on the weekend, please call [phone].     Financial Assistance [phone]   Medical Records [phone]       MENTAL HEALTH CRISIS RESOURCES:  For a emergency help, please call 911 or go to the nearest Emergency Department.     Emergency Walk-In Options:   EmPATH Unit @ [facility] ([place]): [phone] - Specialized mental health emergency area designed to be calming  [facility] ([place]): [phone]  [facility] ([place]):  [phone]  [facility] ([place]): [phone]    [facility] Crisis Information:   [place]: [phone]  [place]: [phone]  [place] - Adult: [phone]     Child: [phone]  [place] - Adult: [phone]     Child: [phone]  [place]: [phone]  List of all [facility] resources:   https://[place].gov/dhs/people-we-serve/adults/health-care/mental-health/resources/crisis-contacts.jsp    National Crisis Information:   Crisis Text Line: Text  [place]  to 647805  Suicide & Crisis Lifeline: 988  National Suicide Prevention Lifeline: 0-197-174-KNPO (1-939.158.7383)       For online chat options, visit https://suicidepreventionlifeline.org/chat/  Poison Control Center: 1-310.282.7043  Trans Lifeline: 1-269.264.4286 - Hotline for transgender people of all ages  The [place] Project: 7-014-207-4576 - Hotline for LGBT youth     For Non-Emergency Support:   Fast Tracker: Mental Health & Substance Use Disorder Resources -   https://www.[facility].org/       Crisis Nurseries:  [facility] ([place]) [phone]  [facility] ([place]) [phone]  [facility] ([place]) [phone]  [facility] ([place]; 72 hours max/0-6yrs) [phone]  [facility] (6 wks-12 yrs) [phone]
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
## 2024-02-01 NOTE — NURSING NOTE
Is the patient currently in the state of [place]? YES    Visit mode:VIDEO    If the visit is dropped, the patient can be reconnected by: VIDEO VISIT: Text to cell phone:   Telephone Information:   Mobile [phone]       Will anyone else be joining the visit? NO  (If patient encounters technical issues they should call [phone]    How would you like to obtain your AVS? MyChart    Are changes needed to the allergy or medication list? Pt stated no changes to allergies and Pt stated no med changes    Reason for visit: Consult    Marie DONALDSON

## 2024-02-06 ENCOUNTER — VIRTUAL VISIT (OUTPATIENT)
Dept: PSYCHIATRY | Facility: CLINIC | Age: 24
End: 2024-02-06
Payer: COMMERCIAL

## 2024-02-06 DIAGNOSIS — F33.2 SEVERE EPISODE OF RECURRENT MAJOR DEPRESSIVE DISORDER, WITHOUT PSYCHOTIC FEATURES (H): Primary | ICD-10-CM

## 2024-02-06 DIAGNOSIS — Z91.51 HISTORY OF SUICIDAL BEHAVIOR: ICD-10-CM

## 2024-02-06 PROCEDURE — 90834 PSYTX W PT 45 MINUTES: CPT | Mod: 95

## 2024-02-08 ENCOUNTER — VIRTUAL VISIT (OUTPATIENT)
Dept: PSYCHIATRY | Facility: CLINIC | Age: 24
End: 2024-02-08
Payer: COMMERCIAL

## 2024-02-08 ENCOUNTER — TELEPHONE (OUTPATIENT)
Dept: PSYCHIATRY | Facility: CLINIC | Age: 24
End: 2024-02-08
Payer: COMMERCIAL

## 2024-02-08 DIAGNOSIS — F41.1 GENERALIZED ANXIETY DISORDER: Primary | ICD-10-CM

## 2024-02-08 DIAGNOSIS — F33.1 MDD (MAJOR DEPRESSIVE DISORDER), RECURRENT EPISODE, MODERATE (H): ICD-10-CM

## 2024-02-08 PROCEDURE — 99214 OFFICE O/P EST MOD 30 MIN: CPT | Mod: 95

## 2024-02-08 PROCEDURE — G2211 COMPLEX E/M VISIT ADD ON: HCPCS | Mod: 95

## 2024-02-08 RX ORDER — ESCITALOPRAM OXALATE 5 MG/1
TABLET ORAL
Qty: 53 TABLET | Refills: 0 | Status: SHIPPED | OUTPATIENT
Start: 2024-02-08 | End: 2024-05-02

## 2024-02-08 NOTE — TELEPHONE ENCOUNTER
BLANCA left voicemail offering support. Provided contact number.    Vonnie aSndy, MSW, [facility]  [phone]        ----- Message from RAPHAEL Lewis CNP sent at 2/1/2024  3:12 PM CST -----  Brynn RIOS!    I met with this pt for the first time today.    Her landlord is visiting next Monday and she has 3 cats in her apartment which aren't alolowed. We talked about the use of an emotional support animal letter which I am willing to provide. However she is wondering about the legal protection she might have with this and I'm actually not 100% sure. Would one of you be willing/able to help with this?    She also recently failed to show for a court hearing (through [facility]) and she told me today that she had requested a different  but was told that she couldn't do this and is now representing herself and has a warrant out...     I have no idea if we are able to offer any support in this area, but thought I would at least ask.    Thanks in advance for your help!  Brittny
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
## 2024-02-08 NOTE — NURSING NOTE
Is the patient currently in the state of [place]? YES    Visit mode:VIDEO    If the visit is dropped, the patient can be reconnected by: VIDEO VISIT: Text to cell phone:   Telephone Information:   Mobile [phone]       Will anyone else be joining the visit? NO  (If patient encounters technical issues they should call [phone]    How would you like to obtain your AVS? MyChart    Are changes needed to the allergy or medication list? Pt stated no changes to allergies and Pt stated no med changes    Reason for visit: TIO DONALDSON

## 2024-02-08 NOTE — PATIENT INSTRUCTIONS
Plan:  -Start Lexapro 5mg daily for a week; then increase to 10mg daily     Please contact clinic if suicidal thinking occurs/worsens after starting the medication.    **For crisis resources, please see the information at the end of this document**   Patient Education    Thank you for coming to the [facility] [place] [facility].     Lab Testing:  If you had lab testing today and your results are reassuring or normal they will be mailed to you or sent through [employer] within 7 days. If the lab tests need quick action we will call you with the results. The phone number we will call with results is # [phone]. If this is not the best number please call our clinic and change the number.     Medication Refills:  If you need any refills please call your pharmacy and they will contact us. Our fax number for refills is [phone].   Three business days of notice are needed for general medication refill requests.   Five business days of notice are needed for controlled substance refill requests.   If you need to change to a different pharmacy, please contact the new pharmacy directly. The new pharmacy will help you get your medications transferred.     Contact Us:  Please call [phone] during business hours (8-5:00 M-F).   If you have medication related questions after clinic hours, or on the weekend, please call [phone].     Financial Assistance [phone]   Medical Records [phone]       MENTAL HEALTH CRISIS RESOURCES:  For a emergency help, please call 911 or go to the nearest Emergency Department.     Emergency Walk-In Options:   EmPATH Unit @ [facility] ([place]): [phone] - Specialized mental health emergency area designed to be calming  [facility] ([place]): [phone]  [facility] ([place]): [phone]  [facility]): [phone]    [facility] Crisis Information:   [place]:  [phone]  [place]: [phone]  [place] (COPE) - Adult: [phone]     Child: [phone]  [place] - Adult: [phone]     Child: [phone]  [place]: [phone]  List of all [facility] resources:   https://[place].gov/dhs/people-we-serve/adults/health-care/mental-health/resources/crisis-contacts.jsp    National Crisis Information:   Crisis Text Line: Text  [place]  to 870992  Suicide & Crisis Lifeline: 988  National Suicide Prevention Lifeline: 1-642-454-TALK (1-583.640.8368)       For online chat options, visit https://suicidepreventionlifeline.org/chat/  Poison Control Center: 4-629-027-4704  Trans Lifeline: 1-847.314.3964 - Hotline for transgender people of all ages  The [place] Project: 3-792-362-9160 - Hotline for LGBT youth     For Non-Emergency Support:   Fast Tracker: Mental Health & Substance Use Disorder Resources -   https://www.[place].org/

## 2024-02-08 NOTE — PROGRESS NOTES
Virtual Visit Details    Type of service:  Video Visit   Video Start Time:  3:03PM  Video End Time: 3:35PM  *Transferred to phone visit at 3:15PM due to technology issues.    Originating Location (pt. Location): Home    Distant Location (provider location):  Off-site  Platform used for Video Visit: [employer]green

## 2024-02-13 ENCOUNTER — TELEPHONE (OUTPATIENT)
Dept: PSYCHIATRY | Facility: CLINIC | Age: 24
End: 2024-02-13
Payer: COMMERCIAL

## 2024-02-13 NOTE — TELEPHONE ENCOUNTER
This writer called to inquire if Kevin is still able to meet today at 4 pm. Reminded Kevin that as we have discussed this is my last week in clinic prior to parental leave and that Carmen Delgado [facility], would be reaching out to scheduling therapy as part of continuity of care plan. Encouraged Vanessaangela to give me a call back and we could try to reschedule our appointment for later this week if possible. Shared with Vanessahannalucy I will connect with Carmen before my leave to provide a brief update of recent work together, and that I feel honored to have the opportunity to work with her, and will be keeping her and Kervin in my thoughts. I let Kevin know I would plan to reach out to offer to reconnect and reestablish care, if desired, when I return to the office in June.        Luisa Flores, PhD,    Women's Wellbeing Program  Office Line: [phone]

## 2024-02-23 ENCOUNTER — TELEPHONE (OUTPATIENT)
Dept: PSYCHIATRY | Facility: CLINIC | Age: 24
End: 2024-02-23
Payer: COMMERCIAL

## 2024-02-23 NOTE — TELEPHONE ENCOUNTER
SW placed call to Kevin.    She would like to work with writer while therapist is on leave. Scheduled appointment for 2/29 at 1:00 pm.    Writer also followed up about resource needs, per Brittny Jimenez. Kevin was able to get this figured out. She has until middle of March to pay her rent and she feels confident she will be able to pay it at that point.    THERESA Torres, [facility]  [phone]

## 2024-02-27 NOTE — PROGRESS NOTES
[facility]  OUTPATIENT PSYCHOTHERAPY PROGRESS NOTE     Client Name: Kevin Urena    YOB: 2000 (23 year old)          Date of Service:  Feb 6, 2024  Time of Service: 4:16 pm to 5:01 pm (45 minutes)  Service Type(s): 63336 psychotherapy (38-52 min. with patient and/or family)  Type of service: Telemedicine Psychotherapy   Reason for Telemedicine Visit: transportation barriers, patient preference, to foster engagement  Mode of transmission: Secure real time [employer] audio and visual telecommunication system (IPLocks)  Location of originating and distant sites:  ? Originating site (patient location): patient logged in from hospital/clinic where sister was being treated   ? Distant site (provider site): HIPAA compliant location within provider home/remote setting  Telemedicine Visit: The patient's condition can be safely assessed and treated via synchronous audio and visual telemedicine encounter. Patient/family has agreed to receive services via telemedicine technology.     Diagnoses:     Major depressive disorder, recurrent, severe, without psychotic features, with anxious distress (F33.2)    History of suicidal ideation and behavior (Z91.51)     Individuals Present:   Patient presented with sister       Treatment goal(s) being addressed:   1. Continue to develop and enhance therapeutic rapport given intermittent engagement in treatment following break between initial appointment (8/2/23) and recently re-establishing care (11/27), and facilitate ongoing engagement in care/transfer of care to Carmen Delgado [facility], who will be providing support to Kevin during this writer's upcoming parental leave    2. Continue to support harm reduction approach to substance use, leverage MI to elicit and enhance change talk, and explore treatment goals and options with Keyluzlucy (e.g.Kymberly)    4. Continue to support Kevin's use of safety plan and resources in response to  "any return or worsening of suicidal ideation, and continue to explore treatment options for higher-level of care as needed (e.g., [facility])      Subjective:  Kevin is currently approximately 5 months postpartum. Today Kevin reported improvement in acute stress she endorsed at our previous appointment, stating, \"I feel ok.\" She denied any passive suicidal ideation or intent or plan to harm or kill herself. Kevin reported she has continued to reduce/avoid substance and alcohol use since our last appointment.       Kevin is not currently taking any psychiatric medication. She completed an initial medication consultation with RAPHAEL Lewis CNP, on 2/1, and they planned to defer any decisions to initiate medications until their next appointment, which is scheduled for 2/8.      Treatment:   Individual psychotherapy with cognitive behavioral therapy (CBT) framework leveraging solution-oriented approach. Kevin apologized for logging on late as she forgot about our appointment. Today, we began by continuing to provide support for Kevin efforts to cope with recent multiple stressors. Continued to assess for any suicidal ideation or safety concerns. Ongoing coaching to support use of DBT-informed strategies, including distress tolerance coping strategies (e.g., STOP skill, soothe with the 6 senses). Continued to use Solution-oriented and guided participation approach to support Kevin in identifying goals, brainstorming resources, recognizing and leveraging strengths, and enhancing competencies. Offered ongoing coaching and positive reinforcement to recognize success in harm reduction, and support use of behavioral activation and social support to target, reduce, and manage low mood. Continued to prepare for this writer's upcoming parental leave with confirmation of plan to meet once more prior to [facility] " "Kevin remembers connecting with in the past and is enthusiastic about working with during this writer's leave. Throughout appointment provided validation, modeled nonjudgmental stance and curiosity in Kevin's thoughts/feelings/expeirences, and leveraged lens of cultural humility.      Assessment and Progress:   Appearance: Appropriately dressed and groomed   Behavior/relationship to examiner/demeanor: open and honest; appeared distracted while responding intermittently to questions from sister  Motor activity/EPS: Within normal limits  Speech rate:  Within normal limits  Speech volume:  Within normal limits  Speech articulation:  Within normal limits  Speech coherence:  Within normal limits  Speech spontaneity:  Within normal limit  Mood (subjective report): \"ok\"; denied SI   Affect (objective appearance): Within normal limits   Thought Process (Associations): Goal directed  Thought process (Rate):  Within normal limits  Thought content: None  Abnormal Perception: None  Insight:  Within normal limits  Judgment:  Within normal limits     I believe Kevin is benefiting from recently re-establishing and re-engaging in regular outpatient therapy. We recently explored resources for higher level of care/support, including returning to [facility], or considering treatment and support through [facility]. At that time, Kevin was not open/interested in engaging in a higher level of support,as she did not feel they are needed, but expressed openness to reconsider these options should she feel her needs change. Kevin remains motivated to continue to engage in outpatient psychotherapy to target symptoms of depression. In our last few appointments, Kevin has continued to express insight that a harm reduction approach to substance/achol use has been beneficial to her mood and the management of suicidal ideation/behaviors. Kevin has reported her relationships with her mother and " jessica have a history of instability with a significant impact on her mood and experience of suicidal ideation. As these relationships are her two major sources of social support, we continue to develop and enhance Kevin's ability to use coping strategies to manage relational stress, practice healthy emotional boundaries and effective interpersonal communication strategies, and develop/leverage safety/stability from other social relationships in her life. Given history of suicidal ideation/behaivors we continue to discuss importance of use of safety plan, resources, and coping strategies if suicidal ideation returns/worsens. Recently we explored benefit of connecting Kevin to social and financial resources, and at [facility] request/with her permission this writer submitted a request for case management and review financial resources to pay for childcare.      Answers for HPI/ROS submitted by the patient on 2/1/2024  GUCCI 7 TOTAL SCORE: 13, indicating moderate anxiety symptoms     Plan:   Continue with regular therapy. We plan to meet once more prior to this writer's upcoming parental leave. At that time Kevin plans to transition to working with THERESA Crouch, [facility].      Recently we dicussed diaper resources for parents that Kevin has leveraged, including:     Https://www.diaperbankmn.org    Https://firstcare[place].org      Recently, with Kevin's permission and at her request this writer submitted a request for case management services on 1/30. Kevin provided verbal permission for this writer to share copy of DA and fill out application. She agreed to sign JAZZ that was shared with her to formally document permission to share this health information as part of request.      Recently we began exploring financial resources to pay for childcare, including the following:     Https://mnb[place]efits.[place].gov    https://www.thinksmall.org/parents/       If suicidal ideation returns or worsens,  follow safety plan we developed including the following resources and coping strategies:      1. Continue to reduce/avoid alcohol use as Kevin is aware this is a factor that can worsen SI.   2. Call 911 or present directly to ED with active intent or plan. This is a psychiatric emergency and you are deserving of support! Recommended [facility]PS Unit or [facility]'s EmPATH Unit given Kevin's previous negative ED experience at [facility] following a suicide attempt.   3. Use STOP or Soothe with the 6 Senses Coping Strategies to help tolerate distress on the way to ED      4. Use Crisis Support Lines for support when suicidal ideation is passive and does not involve active intent or plan:        Recently we have discussed resources for higher level of support, including:     [facility]:  [email or web address]/mother-[email or web address]-day[email or web address]/    [facility]: [email or web address]  At this time Kevin is not open/interested in a higher level of support as she does not feel it is needed. She is aware of these options and open to considering them in the future if she feels her need for support changes.       Recently we discussed clinic attendance policies, and explored strategies to support Kevin's ability to attend or cancel appointments in advance (e.g., use of alarm, reminder, etc.). To reschedule appointments Kevin is encouraged to call [facility] psychiatry clinic at: [phone]     Treatment Plan review due: Given this writer's upcoming leave, we do not plan to renew Kevin's treatment plan at this time.     Luisa Flores, PhD, LP  Licensed Clinical Psychologist   , Dept of Psychiatry

## 2024-02-29 ENCOUNTER — VIRTUAL VISIT (OUTPATIENT)
Dept: PSYCHIATRY | Facility: CLINIC | Age: 24
End: 2024-02-29
Attending: SOCIAL WORKER
Payer: COMMERCIAL

## 2024-02-29 DIAGNOSIS — F33.2 SEVERE EPISODE OF RECURRENT MAJOR DEPRESSIVE DISORDER, WITHOUT PSYCHOTIC FEATURES (H): Primary | ICD-10-CM

## 2024-02-29 PROCEDURE — 90834 PSYTX W PT 45 MINUTES: CPT | Mod: 95 | Performed by: SOCIAL WORKER

## 2024-03-07 ENCOUNTER — TELEPHONE (OUTPATIENT)
Dept: PSYCHIATRY | Facility: CLINIC | Age: 24
End: 2024-03-07
Payer: COMMERCIAL

## 2024-03-07 NOTE — TELEPHONE ENCOUNTER
Kevin not on screen for scheduled therapy session. SW left message to check in, attempt reschedule, and to ask if she needed med refills (also missed appointment with Brittny Jimenez today).    THERESA Torres, [facility]  [phone]

## 2024-03-15 NOTE — PROGRESS NOTES
OUTPATIENT PSYCHOTHERAPY PROGRESS NOTE    Client Name: Kevin Urena   YOB: 2000 (23 year old)   Date of Service:  Feb 29, 2024  Time of Service: 1:02 pm to 1:42 pm (40 minutes)  Service Type(s):76658 psychotherapy (38-52 min. with patient and/or family)    Type of service: Telehealth Individual Psychotherapy  Reason for Telemedicine Visit: COVID-19 public health recommendations on in-person sessions, patient new mom, less disruption with video visits  Mode of transmission: Secure real time interactive audio and visual telecommunication system (videoconference via [employer])  Location of originating and distant sites:  Originating site (patient location): patient home  Distant site (provider site): HIPAA compliant location within provider home/remote setting  Telemedicine Visit: The patient's condition can be safely assessed and treated via synchronous audio and visual telemedicine encounter.    Patient has agreed to receiving services via telemedicine technology.    Diagnoses:   Major Depressive Disorder, recurrent, severe, without psychosis, with anxious distress (F33.2)    Individuals Present:   Client attended alone    Treatment goal(s) being addressed:   -re-establish rapport  -assess current mental health and immediate needs  -provide short term interventions to manage stressors and depression    Subjective:  Kevin reports some financial and basic needs stressors. She does not identify changes to depressive symptoms and feels she is managing these as well as she can for now. Her son was sleeping during part of session. Kevin was able to focus on writer and discussion while he was sleeping. Writer observed positive connection and interactions between her and son when he was awake.     Treatment:   -Writer utilized validation skills and unconditional positive regard to establish rapport while also obtaining updated psychosocial history. Kevin is currently living with her partner,  his teenage daughter, their infant, and three cats. She has not received benefits for several months and is behind on rent, but believes she will be able to catch up soon. She called the [facility] and was given a . She is hopeful to get benefits restarted soon. She also received notice that her electricity will be turned off due to non-payment.   -Identified symptoms that indicate increase in depression. Kevin identified missing appointments, isolating, increase in distorted thinking, having difficulty eating, having difficulty caring for her baby. Her mother is a significant source of triggers for depression, and potential trauma symptoms.  -Identified what Kevin is looking for in short term therapy with . She appreciates honesty, would like someone to talk to and get support when feeling increased depression.  -briefly discussed Maslow hierarchy of needs, and how it can be difficult to focus on mental health symptoms when basic needs of shelter, food, and safety are not being met. Writer briefly discussed potential resources to manage financial stressors, including bridge to benefits website, the Cold Weather Rule (which may help her avoid electric shut off until mid-April), and [employer] energy assistance program.   -Addressed medication needs. Kevin was prescribed a new medication but has not yet picked up from the pharmacy. Writer utilized motivational interviewing strategies to help Kevin identify her goals for medications. She plans to  today. She expressed some concern about potential side effects. Her provider started her on a low dose of Lexapro (5 mg). Writer reviewed that if she has side effects, they should not last long and she can call nursing team if she  has further questions or concerns about medications.       Patient did not report any changes to medications      Assessment and Progress:    has previously worked with Kevin on care coordination needs. Her  current therapist is out on leave and writer will provide short term therapeutic support while she is on leave. Kevin was open to feedback and has a strong desire to make and maintain changes in her life. Of note, Luisa Flores's treatment plan noted harm reduction of substance use and Kevin did not bring up in today's session. writer will address at next session. Kevin is able to identify that she tends to disengage when symptoms are increased. writer will work to maintain connection with her during Dr. Flores's absence.     Mental Status Exam    Alertness: alert  and oriented  Appearance: adequately groomed  Behavior/Demeanor: cooperative and pleasant, with fair  eye contact   Speech: regular rate and rhythm  Language: no problems  Psychomotor:  distracted  Mood: worried  Affect: full range; was congruent to mood; was congruent to content  Thought Process/Associations: unremarkable  Thought Content:  Reports none;  Denies none  Perception:  Reports none;  Denies none  Insight: adequate  Judgment: fair  Cognition: (6) does  appear grossly intact; formal cognitive testing was not done  Gait and Station: unable to assess due to video visit      Plan:   Writer will send Kevin information on resources. Kevin is to follow up with her  and reach out to [employer] to see if she can avoid electricity shut off. Next therapy appointment has been scheduled for 1 week to continue work on treatment goals.      Treatment Plan review due: treatment plan in place via writer Ciara providing brief care while Dr. Flores is on leave.      THERESA Torres (Patty), [facility]    Direct Phone: [phone]  Fax: [phone]    [facility]  [facility], 2nd floor  [address], [address]  [place] [address]

## 2024-03-19 NOTE — TELEPHONE ENCOUNTER
BLANCA left voicemail encouraging Kevin to return call to follow up on resources and/or scheduling another therapy sessions.    Provided direct contact number and clinic general number.    THERESA Torres, [facility]  [phone]

## 2024-04-04 ENCOUNTER — TELEPHONE (OUTPATIENT)
Dept: PSYCHIATRY | Facility: CLINIC | Age: 24
End: 2024-04-04
Payer: COMMERCIAL

## 2024-04-04 NOTE — TELEPHONE ENCOUNTER
BLANCA left voicemail to check in with Kevin. Encouraged call back to writer or call in to clinic to make an appointment.    THERESA Torres, [facility]  [phone]

## 2024-04-11 NOTE — TELEPHONE ENCOUNTER
SW placed call to Kevin to check in, assess symptoms, and attempt to re-establish therapy.     Kevin answered, but stated she was busy, She stated she would call writer back.    THERESA Torres, [facility]  [phone]

## 2024-04-19 ENCOUNTER — TELEPHONE (OUTPATIENT)
Dept: PSYCHIATRY | Facility: CLINIC | Age: 24
End: 2024-04-19
Payer: COMMERCIAL

## 2024-04-19 NOTE — TELEPHONE ENCOUNTER
Hi,     Just an FYI that I kind of made contact with Kevin (working with Brittny Ingramnava). She was busy so said she couldn't talk. I'll try to keep you updated if she calls back, but if not, would someone be willing to try to reach out to her in another week or so? I am hoping we don't lose touch with her. I'll put a reminder to myself to call again in 2 weeks.     Thanks!     Carmen

## 2024-04-19 NOTE — TELEPHONE ENCOUNTER
Writer reviewed patient's chart. The team continues to be unsuccessful in reaching Kevin. Writer called her at the number on file. Call went straight to , which was full.     Upon review of care everywhere, patient has a future appt on 4/23 at the [facility] through [facility]. Will notify the patient's care team of this.    Will try reaching her by [employer] as well.    Therese Cabrera RN on 4/19/2024 at 10:44 AM

## 2024-04-23 NOTE — TELEPHONE ENCOUNTER
Patient returned call to clinic. Has not taken any medication as she was only able to pick it up today. Patient scheduled a virtual appointment with Carmen on 4/29, and a follow up with Brittny on 6/6 in order to have time to try the medication, and confirmed that she would call clinic if she had any questions or problems with her medication before then.

## 2024-04-29 ENCOUNTER — TELEPHONE (OUTPATIENT)
Dept: PSYCHIATRY | Facility: CLINIC | Age: 24
End: 2024-04-29
Payer: COMMERCIAL

## 2024-04-30 NOTE — TELEPHONE ENCOUNTER
"Kevin did not present for scheduled therapy session. Writer called to check in and potentially problem solve. Patient answered phone.    Patient reports she is okay. She states she just walked in the door and had forgotten about the appointment.     She continues to have anxiety and worries. She has not yet picked up her medications. Writer processed thoughts about medications. She initially noted that it is difficult to get to the pharmacy as her partner is often working Monday through Friday and she does not think her pharmacy is open on the weekends. She is open to writer helping her find a pharmacy closer to her apartment that accepts her insurance. Kevin reports her med provider, Brittny Jimenez, has encouraged her to stop using THC. She reports significant concern about stopping current THC use to try medication. Kevin expressed view that THC helps with her anxiety, depression, and also helps her eat. She has significant fear that she will \"lose it\" if she stops but also wants to follow recommendations from her med provider.     Kevin also shared that she has been hearing a her own voice in her brain telling her things, such as to leave her [employer] program. She is aware it is her voice and not another voice, but does not feel like she can control it. Writer offered empathy, noted that THC may increase likelihood of this.    Kevin agreed to therapy session with writer on Thursday of this week. Writer will follow up with medication provider regarding medication recommendations.     Vonnie Delgado, THERESA, [facility]  [phone]    "

## 2024-05-02 ENCOUNTER — TELEPHONE (OUTPATIENT)
Dept: PSYCHIATRY | Facility: CLINIC | Age: 24
End: 2024-05-02
Payer: COMMERCIAL

## 2024-05-02 DIAGNOSIS — F41.1 GENERALIZED ANXIETY DISORDER: ICD-10-CM

## 2024-05-02 DIAGNOSIS — F33.1 MDD (MAJOR DEPRESSIVE DISORDER), RECURRENT EPISODE, MODERATE (H): ICD-10-CM

## 2024-05-02 RX ORDER — ESCITALOPRAM OXALATE 5 MG/1
TABLET ORAL
Qty: 53 TABLET | Refills: 0 | Status: SHIPPED | OUTPATIENT
Start: 2024-05-02 | End: 2024-06-06 | Stop reason: DRUGHIGH

## 2024-05-02 NOTE — TELEPHONE ENCOUNTER
Kevin did not present for scheduled therapy session. Writer called to check in. She stated she was at the [employer] and could not get on video,but could talk now. Writer encouraged privacy while completing therapy and declined session today.    Writer provided update on medication after checking with provider. Reviewed recommendation is no THC with medication but there are no known harmful contraindications between THC and Lexapro. Writer encouraged her to  prescription and try medication to see how it may impact mood and other symptoms. Writer also found 2 pharmacies near her home that accept her insurance and are open on the weekend: [facility] and [facility]. Kevin would like to try [facility]. She asked that clinic send prescription to [facility] [address] and stated she would  medication.    Writer asked to schedule therapy session for next week. Call ended abruptly after this.    Writer will follow up in 1-2 weeks.    THERESA Torres, [facility]  [phone]

## 2024-05-14 ENCOUNTER — TELEPHONE (OUTPATIENT)
Dept: PSYCHIATRY | Facility: CLINIC | Age: 24
End: 2024-05-14
Payer: COMMERCIAL

## 2024-05-14 NOTE — TELEPHONE ENCOUNTER
Attempted phone call to check in with patient. Voice mailbox full and cannot accept messages. Will send my chart.    THERESA Torres, LICSW  [phone]

## 2024-05-29 ENCOUNTER — TELEPHONE (OUTPATIENT)
Dept: PSYCHIATRY | Facility: CLINIC | Age: 24
End: 2024-05-29
Payer: COMMERCIAL

## 2024-05-29 NOTE — TELEPHONE ENCOUNTER
Writer attempted to call [facility] to check in. Mailbox is full and cannot accept messages.    THERESA Torres, LICSW  [phone]

## 2024-06-06 ENCOUNTER — VIRTUAL VISIT (OUTPATIENT)
Dept: PSYCHIATRY | Facility: CLINIC | Age: 24
End: 2024-06-06
Payer: COMMERCIAL

## 2024-06-06 VITALS — WEIGHT: 103 LBS | BODY MASS INDEX: 18.84 KG/M2

## 2024-06-06 DIAGNOSIS — F33.1 MDD (MAJOR DEPRESSIVE DISORDER), RECURRENT EPISODE, MODERATE (H): ICD-10-CM

## 2024-06-06 DIAGNOSIS — F41.1 GENERALIZED ANXIETY DISORDER: Primary | ICD-10-CM

## 2024-06-06 PROCEDURE — 90833 PSYTX W PT W E/M 30 MIN: CPT | Mod: 95

## 2024-06-06 PROCEDURE — 99214 OFFICE O/P EST MOD 30 MIN: CPT | Mod: 95

## 2024-06-06 PROCEDURE — G2211 COMPLEX E/M VISIT ADD ON: HCPCS | Mod: 95

## 2024-06-06 RX ORDER — ESCITALOPRAM OXALATE 5 MG/1
2.5 TABLET ORAL DAILY
Qty: 15 TABLET | Refills: 0 | Status: SHIPPED | OUTPATIENT
Start: 2024-06-06

## 2024-06-06 ASSESSMENT — ANXIETY QUESTIONNAIRES
5. BEING SO RESTLESS THAT IT IS HARD TO SIT STILL: NEARLY EVERY DAY
4. TROUBLE RELAXING: NEARLY EVERY DAY
GAD7 TOTAL SCORE: 15
6. BECOMING EASILY ANNOYED OR IRRITABLE: NEARLY EVERY DAY
3. WORRYING TOO MUCH ABOUT DIFFERENT THINGS: NEARLY EVERY DAY
1. FEELING NERVOUS, ANXIOUS, OR ON EDGE: SEVERAL DAYS
GAD7 TOTAL SCORE: 15
GAD7 TOTAL SCORE: 15
7. FEELING AFRAID AS IF SOMETHING AWFUL MIGHT HAPPEN: SEVERAL DAYS
IF YOU CHECKED OFF ANY PROBLEMS ON THIS QUESTIONNAIRE, HOW DIFFICULT HAVE THESE PROBLEMS MADE IT FOR YOU TO DO YOUR WORK, TAKE CARE OF THINGS AT HOME, OR GET ALONG WITH OTHER PEOPLE: SOMEWHAT DIFFICULT
2. NOT BEING ABLE TO STOP OR CONTROL WORRYING: SEVERAL DAYS
7. FEELING AFRAID AS IF SOMETHING AWFUL MIGHT HAPPEN: SEVERAL DAYS
8. IF YOU CHECKED OFF ANY PROBLEMS, HOW DIFFICULT HAVE THESE MADE IT FOR YOU TO DO YOUR WORK, TAKE CARE OF THINGS AT HOME, OR GET ALONG WITH OTHER PEOPLE?: SOMEWHAT DIFFICULT

## 2024-06-06 ASSESSMENT — PAIN SCALES - GENERAL: PAINLEVEL: NO PAIN (0)

## 2024-06-06 ASSESSMENT — PATIENT HEALTH QUESTIONNAIRE - PHQ9
SUM OF ALL RESPONSES TO PHQ QUESTIONS 1-9: 3
10. IF YOU CHECKED OFF ANY PROBLEMS, HOW DIFFICULT HAVE THESE PROBLEMS MADE IT FOR YOU TO DO YOUR WORK, TAKE CARE OF THINGS AT HOME, OR GET ALONG WITH OTHER PEOPLE: SOMEWHAT DIFFICULT
SUM OF ALL RESPONSES TO PHQ QUESTIONS 1-9: 3

## 2024-06-06 NOTE — PROGRESS NOTES
Virtual Visit Details    Type of service:  Video Visit   Video Start Time: 4:38p  Video End Time:5:13pm    Originating Location (pt. Location): Home    Distant Location (provider location):  Off-site  Platform used for Video Visit: Deepak

## 2024-06-06 NOTE — PATIENT INSTRUCTIONS
Medication Plan  -Start Lexapro 2.5mg daily     **For crisis resources, please see the information at the end of this document**   Patient Education    Thank you for coming to the [facility] [place] [facility].     Lab Testing:  If you had lab testing today and your results are reassuring or normal they will be mailed to you or sent through WEbook within 7 days. If the lab tests need quick action we will call you with the results. The phone number we will call with results is # [phone]. If this is not the best number please call our clinic and change the number.     Medication Refills:  If you need any refills please call your pharmacy and they will contact us. Our fax number for refills is [phone].   Three business days of notice are needed for general medication refill requests.   Five business days of notice are needed for controlled substance refill requests.   If you need to change to a different pharmacy, please contact the new pharmacy directly. The new pharmacy will help you get your medications transferred.     Contact Us:  Please call [phone] during business hours (8-5:00 M-F).   If you have medication related questions after clinic hours, or on the weekend, please call [phone].     Financial Assistance [phone]   Medical Records [phone]       MENTAL HEALTH CRISIS RESOURCES:  For a emergency help, please call 911 or go to the nearest Emergency Department.     Emergency Walk-In Options:   EmPATH Unit @ [facility] ([place]): [phone] - Specialized mental health emergency area designed to be calming  [facility] ([place]): [phone]  [facility] ([place]): [phone]  [facility]): [phone]    [facility] Crisis Information:   [place]ain: [phone]  [place]: [phone]  Ligia ([place]) - Adult: [phone]     Child: [phone]  Ion - Adult: [phone]      [facility]: [phone]  [place]: [phone]  List of all [facility] resources:   https://[place].gov/dhs/people-we-serve/adults/health-care/mental-health/resources/crisis-contacts.jsp    National Crisis Information:   Crisis Text Line: Text  [place]  to 079524  Suicide & Crisis Lifeline: 988  National Suicide Prevention Lifeline: 0-580-687-TALK (1-242.158.5386)       For online chat options, visit https://suicidepreventionlifeline.org/chat/  Poison Control Center: 2-835-136-2576  Trans Lifeline: 1-410.355.9598 - Hotline for transgender people of all ages  The [place] Project: 8-662-941-7665 - Hotline for LGBT youth     For Non-Emergency Support:   Fast Tracker: Mental Health & Substance Use Disorder Resources -   https://www.TunezytrackSoundTagn.org/
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
## 2024-06-06 NOTE — NURSING NOTE
Is the patient currently in the state of [place]? YES    Visit mode:VIDEO    If the visit is dropped, the patient can be reconnected by: VIDEO VISIT: Text to cell phone:   Telephone Information:   Mobile [phone]       Will anyone else be joining the visit? NO  (If patient encounters technical issues they should call [phone]    How would you like to obtain your AVS? MyChart    Are changes needed to the allergy or medication list? No    Are refills needed on medications prescribed by this physician? NO    Reason for visit: RECHECK    Eugenia DONALDSON

## 2024-06-06 NOTE — Clinical Note
Hi!  I saw Kevin today and she seemed like she was doing better! However, wondering if you would be willing to help her with childcare benefits - apparently she still hasn't obtained coverage and I'm not sure what the hold-up is. She was also in process of applying for rental assistance (is this a thing?) but has been having a hard time getting this setup. Any assistance you could provide would be so helpful.  Her  is: Xiomy Littlejohn (ph: [phone]) with [facility]  Thank you! Brittny

## 2024-06-17 ENCOUNTER — VIRTUAL VISIT (OUTPATIENT)
Dept: PSYCHIATRY | Facility: CLINIC | Age: 24
End: 2024-06-17
Payer: COMMERCIAL

## 2024-06-17 DIAGNOSIS — F33.2 SEVERE EPISODE OF RECURRENT MAJOR DEPRESSIVE DISORDER, WITHOUT PSYCHOTIC FEATURES (H): Primary | ICD-10-CM

## 2024-06-17 DIAGNOSIS — Z91.51 HISTORY OF SUICIDAL BEHAVIOR: ICD-10-CM

## 2024-06-17 PROCEDURE — 90837 PSYTX W PT 60 MINUTES: CPT | Mod: 95

## 2024-06-17 NOTE — PROGRESS NOTES
WOMEN'S WELLBEING PROGRAM  OUTPATIENT PSYCHOTHERAPY PROGRESS NOTE     Client Name: Kevin Urena    YOB: 2000 (23 year old)          Date of Service:  June 17, 2024  Time of Service: 9:06 am to 10:00 am (54 minutes)  Service Type(s): 72807 psychotherapy (53-60 min. with patient and/or family)  Type of service: Telemedicine Psychotherapy   Reason for Telemedicine Visit: transportation barriers, patient preference, to foster engagement  Mode of transmission: Secure real time [email or web address] audio and visual telecommunication system ([employer])  Location of originating and distant sites:  Originating site (patient location): patient logged in from car   Distant site (provider site): HIPAA compliant location within provider home/remote setting  Telemedicine Visit: The patient's condition can be safely assessed and treated via synchronous audio and visual telemedicine encounter. Patient/family has agreed to receive services via telemedicine technology.     Diagnoses:   Major depressive disorder, recurrent, severe, without psychotic features, with anxious distress (F33.2)  History of suicidal ideation and behavior (Z91.51)     Individuals Present:   Patient and psychologist      Treatment goal(s) being addressed:   1. Continue to enhance therapeutic rapport to support engagement in care  2. Continue to support harm reduction approach to substance use, leverage MI to elicit and enhance change talk, and explore treatment goals and options with Kevin (e.g., [place])    4. Continue to support Kevin's use of safety plan and resources in response to any return or worsening of suicidal ideation, and continue to explore treatment options for higher-level of care as needed      Subjective:  Kevin is currently approximately 10 months postpartum. Today Kevin reported ongoing acute parenting, financial, and legal stress. She endorsed depressive and mood dysregulation symptoms that she feels are  "currently elevated in context of luteal phase, including irritability, low mood, and difficulty concentrating. She endorsed symptoms of anxiety, including restlessness and difficulty relaxing. She endorsed worries related to stressors, but denied generalized worries or worries that are difficult to dismiss. She denied any current passive suicidal ideation or intent or plan to harm or kill herself. She last experienced suicidal ideation approximately 3 weeks ago. Kevin reported she is continuing to reduce/avoid alcohol use, and endorsed ongoing marijuana use.      Kevin is not currently taking any psychiatric medication. She has been prescribed sertraline by Brittny Jimenez, APRN, CNP, but stated today \"I'm not motivated to start it or do anything [related to medication treatment] right now\" due to feeling overwhelmed.       Treatment:   Individual psychotherapy with cognitive behavioral therapy (CBT) framework leveraging solution-oriented approach. Today, we began by re-establishing rapport as our last appointment was 2/6 prior to this writer's leave. Assessed current symptoms/concerns. Provided support for Kevin efforts to cope with recent multiple stressors. Ongoing coaching to support use of DBT-informed strategies, including distress tolerance coping strategies (e.g., STOP skill, soothe with the 6 senses). Solution-oriented and guided participation approach used to support Kevin in identifying goals, brainstorming resources, recognizing and leveraging strengths, and enhancing competencies. Offered ongoing coaching and positive reinforcement to support harm reduction approach and use of behavioral activation and social support to target, reduce, and manage low mood and mood dysregulation. Throughout appointment provided validation, modeled nonjudgmental stance and curiosity in Kevin's thoughts/feelings/expeirences, and leveraged lens of cultural humility.      Assessment and Progress: " "  Appearance: Appropriately dressed and groomed   Behavior/relationship to examiner/demeanor: open and honest  Motor activity/EPS: Within normal limits  Speech rate:  Within normal limits  Speech volume:  Within normal limits  Speech articulation:  Within normal limits  Speech coherence:  Within normal limits  Speech spontaneity:  Within normal limit  Mood (subjective report): \"ok\"; denied SI   Affect (objective appearance): Within normal limits   Thought Process (Associations): Goal directed  Thought process (Rate):  Within normal limits  Thought content: None  Abnormal Perception: None  Insight:  Within normal limits  Judgment:  Within normal limits     I believe Kevin will benefit from continuing to engage in regular outpatient therapy. In the past we have explored resources for higher level of care/support, including returning [facility], or [place] [facility] symptoms wax and wane in context of stressors and Kevin has expressed openness to reconsider these options should she feel her needs change. Today Kevin shared she is not currently motivated to continue to engage in outpatient psychiatric treatment. She continued to express insight that a harm reduction approach to alcohol use has been beneficial to her mood and the management of suicidal ideation/behaviors. She is not currently motivated to reduce marijuana use. Kevin has reported her relationships with her mother and fiance have a history of instability with a significant impact on her mood and experience of suicidal ideation. As these relationships are her two major sources of social support, we continue to develop and enhance Kevin's ability to use coping strategies to manage relational stress, practice healthy emotional boundaries and effective interpersonal communication strategies, and develop/leverage safety/stability from other social relationships in " her life. Given history of suicidal ideation/behaivors we continue to discuss importance of use of safety plan, resources, and coping strategies if suicidal ideation returns/worsens. Recently we explored benefit of connecting Kevin to social and financial resources, and at Kevin's request/with her permission this writer submitted a request for case management and review financial resources to pay for childcare.        Plan:   Continue with regular weekly therapy.      Today discussed resource [employer].     Recently we dicussed diaper resources for parents that Kevin has leveraged, including:   Https://www.diaperbankmn.org  Https://firstcaremn.org      Recently, with Kevin's permission and at her request this writer submitted a request for case management services on 1/30. Kevin provided verbal permission for this writer to share copy of DA and fill out application. She agreed to sign JAZZ that was shared with her to formally document permission to share this health information as part of request.      Recently we began exploring financial resources to pay for childcare, including the following:   Https://mnbenefits.[place].gov  https://www.thinksmall.org/parents/       If suicidal ideation returns or worsens, follow safety plan we developed including the following resources and coping strategies:      Continue to reduce/avoid alcohol use as Kevin is aware this is a factor that can worsen SI.   Call 911 or present directly to ED with active intent or plan. This is a psychiatric emergency and you are deserving of support! Recommended [facility] APS Unit or [facility]'s EmPATH Unit given Kevin's previous negative ED experience at [facility] following a suicide attempt.   Use STOP or Soothe with the 6 Senses Coping Strategies to help tolerate distress on the way to ED      Use Crisis Support Lines for support when suicidal ideation is passive and does not involve  active intent or plan:        Previously we have discussed resources for higher level of support, including:   [facility]:  [email or web address]/services/mother-baby-day-hospital/  [facility]: https://www.[facility].org/  At this time Kevin is not open/interested in a higher level of support as she does not feel it is needed. She is aware of these options and open to considering them in the future if she feels her need for support changes.       Previously we discussed clinic attendance policies, and explored strategies to support Kevin's ability to attend or cancel appointments in advance (e.g., use of alarm, reminder, etc.). To reschedule appointments Kevin is encouraged to call [facility] psychiatry clinic at: [phone]     Treatment Plan review due: upcoming appointment     Luisa Flores, PhD,   Licensed Clinical Psychologist   , Dept of Psychiatry

## 2024-06-30 ENCOUNTER — HEALTH MAINTENANCE LETTER (OUTPATIENT)
Age: 24
End: 2024-06-30

## 2024-07-08 ENCOUNTER — TELEPHONE (OUTPATIENT)
Dept: PSYCHIATRY | Facility: CLINIC | Age: 24
End: 2024-07-08
Payer: COMMERCIAL

## 2024-07-08 NOTE — TELEPHONE ENCOUNTER
"This writer received message from Kevin letting me know she had missed her previous appointments with me as she had been incarcerated since 6/17, and was released 7/6. Kevin requested an appointment with this provider and asked, \"can you help me get medicine for my ADHD.\" Returned call to Kevin and left message with multiple openings for therapy appointments with me over the next two weeks. I reminded Kevin I am not a medication prescriber, and inquired if she would like to meet with RAPHAEL Lewis, CNP, and shared contact information for Psychiatry Clinic for scheduling.     Luisa Flores, PhD, LP  Licensed Clinical Psychologist  Women's Wellbeing Program  Dept of Psychiatry & Behavioral Sciences  Office: [phone]   "

## 2024-07-10 ENCOUNTER — TELEPHONE (OUTPATIENT)
Dept: PSYCHIATRY | Facility: CLINIC | Age: 24
End: 2024-07-10
Payer: COMMERCIAL

## 2024-07-10 NOTE — TELEPHONE ENCOUNTER
Kevin contacted me requesting resources to access transportation, as her family's car is in need of repair.     In consultation with Carmen Delgado, I shared these resources with Kevin:     [employer] for low-cost repairs: [phone]    [employer] may help pay for emergency repairs: [phone]    The [employer] [place] also sometimes helps with car repair grants or loans: [phone] or https://caprw.org      Luisa Flores, PhD, LP    Women's Wellbeing Program  Dept of Psychiatry  Direct: [phone]

## 2024-09-22 LAB
ABO/RH(D): NORMAL
ANTIBODY SCREEN: NEGATIVE
SPECIMEN EXPIRATION DATE: NORMAL

## 2024-09-23 ENCOUNTER — HOSPITAL ENCOUNTER (OUTPATIENT)
Dept: ULTRASOUND IMAGING | Facility: HOSPITAL | Age: 24
Discharge: HOME | End: 2024-09-23
Attending: FAMILY MEDICINE | Admitting: FAMILY MEDICINE
Payer: COMMERCIAL

## 2024-09-23 ENCOUNTER — ANCILLARY ORDERS (OUTPATIENT)
Dept: FAMILY MEDICINE | Facility: CLINIC | Age: 24
End: 2024-09-23

## 2024-09-23 ENCOUNTER — PRENATAL OFFICE VISIT (OUTPATIENT)
Dept: FAMILY MEDICINE | Facility: CLINIC | Age: 24
End: 2024-09-23
Payer: COMMERCIAL

## 2024-09-23 VITALS
HEIGHT: 63 IN | WEIGHT: 105.75 LBS | HEART RATE: 80 BPM | RESPIRATION RATE: 16 BRPM | DIASTOLIC BLOOD PRESSURE: 68 MMHG | OXYGEN SATURATION: 98 % | TEMPERATURE: 97.4 F | BODY MASS INDEX: 18.74 KG/M2 | SYSTOLIC BLOOD PRESSURE: 102 MMHG

## 2024-09-23 DIAGNOSIS — Z11.3 SCREEN FOR STD (SEXUALLY TRANSMITTED DISEASE): ICD-10-CM

## 2024-09-23 DIAGNOSIS — Z34.80 PRENATAL CARE, SUBSEQUENT PREGNANCY, UNSPECIFIED TRIMESTER: ICD-10-CM

## 2024-09-23 DIAGNOSIS — K59.00 CONSTIPATION DURING PREGNANCY: ICD-10-CM

## 2024-09-23 DIAGNOSIS — O21.9 NAUSEA AND VOMITING IN PREGNANCY: ICD-10-CM

## 2024-09-23 DIAGNOSIS — Z32.00 ENCOUNTER FOR PREGNANCY TEST: ICD-10-CM

## 2024-09-23 DIAGNOSIS — O99.619 CONSTIPATION DURING PREGNANCY: ICD-10-CM

## 2024-09-23 DIAGNOSIS — Z34.80 PRENATAL CARE, SUBSEQUENT PREGNANCY, UNSPECIFIED TRIMESTER: Primary | ICD-10-CM

## 2024-09-23 LAB
ALBUMIN UR-MCNC: 30 MG/DL
APPEARANCE UR: CLEAR
BILIRUB UR QL STRIP: NEGATIVE
COLOR UR AUTO: YELLOW
ERYTHROCYTE [DISTWIDTH] IN BLOOD BY AUTOMATED COUNT: 12.3 % (ref 10–15)
GLUCOSE UR STRIP-MCNC: NEGATIVE MG/DL
HBV SURFACE AG SERPL QL IA: NONREACTIVE
HCG UR QL: POSITIVE
HCT VFR BLD AUTO: 38.6 % (ref 35–47)
HCV AB SERPL QL IA: NONREACTIVE
HGB BLD-MCNC: 13.3 G/DL (ref 11.7–15.7)
HGB UR QL STRIP: NEGATIVE
HIV 1+2 AB+HIV1 P24 AG SERPL QL IA: NONREACTIVE
KETONES UR STRIP-MCNC: NEGATIVE MG/DL
LEUKOCYTE ESTERASE UR QL STRIP: ABNORMAL
MCH RBC QN AUTO: 30.3 PG (ref 26.5–33)
MCHC RBC AUTO-ENTMCNC: 34.5 G/DL (ref 31.5–36.5)
MCV RBC AUTO: 88 FL (ref 78–100)
NITRATE UR QL: NEGATIVE
PH UR STRIP: 8.5 [PH] (ref 5–7)
PLATELET # BLD AUTO: 262 10E3/UL (ref 150–450)
RBC # BLD AUTO: 4.39 10E6/UL (ref 3.8–5.2)
SP GR UR STRIP: 1.02 (ref 1–1.03)
UROBILINOGEN UR STRIP-ACNC: 0.2 E.U./DL
WBC # BLD AUTO: 7.6 10E3/UL (ref 4–11)

## 2024-09-23 PROCEDURE — 99207 PR NO CHARGE NURSE ONLY: CPT

## 2024-09-23 PROCEDURE — 87086 URINE CULTURE/COLONY COUNT: CPT

## 2024-09-23 PROCEDURE — 87389 HIV-1 AG W/HIV-1&-2 AB AG IA: CPT

## 2024-09-23 PROCEDURE — 86901 BLOOD TYPING SEROLOGIC RH(D): CPT

## 2024-09-23 PROCEDURE — 86803 HEPATITIS C AB TEST: CPT

## 2024-09-23 PROCEDURE — 81025 URINE PREGNANCY TEST: CPT

## 2024-09-23 PROCEDURE — 86780 TREPONEMA PALLIDUM: CPT

## 2024-09-23 PROCEDURE — 87591 N.GONORRHOEAE DNA AMP PROB: CPT

## 2024-09-23 PROCEDURE — 87491 CHLMYD TRACH DNA AMP PROBE: CPT

## 2024-09-23 PROCEDURE — 76801 OB US < 14 WKS SINGLE FETUS: CPT

## 2024-09-23 PROCEDURE — 86762 RUBELLA ANTIBODY: CPT

## 2024-09-23 PROCEDURE — 85027 COMPLETE CBC AUTOMATED: CPT

## 2024-09-23 PROCEDURE — 36415 COLL VENOUS BLD VENIPUNCTURE: CPT

## 2024-09-23 PROCEDURE — 81003 URINALYSIS AUTO W/O SCOPE: CPT

## 2024-09-23 PROCEDURE — 86850 RBC ANTIBODY SCREEN: CPT

## 2024-09-23 PROCEDURE — 99000 SPECIMEN HANDLING OFFICE-LAB: CPT

## 2024-09-23 PROCEDURE — 86900 BLOOD TYPING SEROLOGIC ABO: CPT

## 2024-09-23 PROCEDURE — 83655 ASSAY OF LEAD: CPT | Mod: 90

## 2024-09-23 PROCEDURE — 87340 HEPATITIS B SURFACE AG IA: CPT

## 2024-09-23 RX ORDER — PNV NO.95/FERROUS FUM/FOLIC AC 28MG-0.8MG
1 TABLET ORAL DAILY
Qty: 30 CAPSULE | Refills: 12 | Status: CANCELLED | OUTPATIENT
Start: 2024-09-23

## 2024-09-23 RX ORDER — PNV NO.95/FERROUS FUM/FOLIC AC 28MG-0.8MG
1 TABLET ORAL DAILY
Qty: 30 CAPSULE | Refills: 11 | Status: SHIPPED | OUTPATIENT
Start: 2024-09-23

## 2024-09-23 RX ORDER — PNV NO.95/FERROUS FUM/FOLIC AC 28MG-0.8MG
1 TABLET ORAL DAILY
Qty: 30 CAPSULE | Refills: 12 | Status: SHIPPED | OUTPATIENT
Start: 2024-09-23 | End: 2024-09-23

## 2024-09-23 RX ORDER — ONDANSETRON 4 MG/1
4 TABLET, ORALLY DISINTEGRATING ORAL EVERY 8 HOURS PRN
Qty: 20 TABLET | Refills: 1 | Status: SHIPPED | OUTPATIENT
Start: 2024-09-23 | End: 2024-10-07

## 2024-09-23 RX ORDER — AMOXICILLIN 250 MG
1 CAPSULE ORAL 2 TIMES DAILY
Qty: 60 TABLET | Refills: 6 | Status: SHIPPED | OUTPATIENT
Start: 2024-09-23

## 2024-09-23 ASSESSMENT — ASTHMA QUESTIONNAIRES
ACT_TOTALSCORE: 25
QUESTION_3 LAST FOUR WEEKS HOW OFTEN DID YOUR ASTHMA SYMPTOMS (WHEEZING, COUGHING, SHORTNESS OF BREATH, CHEST TIGHTNESS OR PAIN) WAKE YOU UP AT NIGHT OR EARLIER THAN USUAL IN THE MORNING: NOT AT ALL
ACT_TOTALSCORE: 25
QUESTION_4 LAST FOUR WEEKS HOW OFTEN HAVE YOU USED YOUR RESCUE INHALER OR NEBULIZER MEDICATION (SUCH AS ALBUTEROL): NOT AT ALL
QUESTION_5 LAST FOUR WEEKS HOW WOULD YOU RATE YOUR ASTHMA CONTROL: COMPLETELY CONTROLLED
QUESTION_1 LAST FOUR WEEKS HOW MUCH OF THE TIME DID YOUR ASTHMA KEEP YOU FROM GETTING AS MUCH DONE AT WORK, SCHOOL OR AT HOME: NONE OF THE TIME
QUESTION_2 LAST FOUR WEEKS HOW OFTEN HAVE YOU HAD SHORTNESS OF BREATH: NOT AT ALL

## 2024-09-23 NOTE — TELEPHONE ENCOUNTER
This should have been routed to the midwives to handle.    This has only been ordered by one provider - a midwife Benita Lynn APRN CNM     I did move the order to the preferred pharmacy using the midwife Benita Lynn APRN CNM  as prescriber.

## 2024-09-23 NOTE — TELEPHONE ENCOUNTER
Medication Question or Refill    Contacts       Contact Date/Time Type Contact Phone/Fax    09/23/2024 02:33 PM CDT Phone (Incoming) Kevin Urena (Self) [phone] (M)        Prenatal MV-Min-Fe Fum-FA-DHA (PRENATAL MULTIVITAMIN PLUS DHA) 27-0.8-250 MG CAPS   ondansetron (ZOFRAN ODT) 4 MG ODT tab   What medication are you calling about (include dose and sig)?: see above.    Patient needs these refills sent to a new Pharmacy the previous Pharmacy doesn't take her insurance    Preferred Pharmacy:   [facility] [address] [address]  [phone]  Controlled Substance Agreement on file:   CSA -- Patient Level:    CSA: None found at the patient level.       Who prescribed the medication?: pcp    Do you need a refill? Yes-need the refills called to a new Pharmacy    When did you use the medication last? Just recieved    Patient offered an appointment? No    Do you have any questions or concerns?  Yno       Could we send this information to you in [facility] or would you prefer to receive a phone call?:   Patient would prefer a phone call   Okay to leave a detailed message?: Yes at Home number on file  [phone]

## 2024-09-23 NOTE — PROGRESS NOTES
SUBJECTIVE:     HPI:    This is a 23 year old female patient,  who presents for her first obstetrical visit.    SOURAV: 2025. She is 8w1d today.  Her cycles are regular.  Her last menstrual period was normal.   Since her LMP, she has experienced  nausea, emesis, fatigue, headache, urinary urgency, and constipation).   She denies abdominal pain, vaginal discharge, dysuria, pelvic pain, and vaginal bleeding.    Additional History:  reports one term vaginal delivery. Denies hx of pree, GDM, no major surgeries. Positive urine pregnancy test today in clinic. She plans to deliver at [facility]. She will be calling [facility] to make an appointment for an early OB ultrasound. She missed her first provider IOB appointment today with Dr. Hess. She plans to stay with [facility] for her OB care, postpartum, and for baby. Patient did express interest in getting Myriad/genetic testing done---not ordered today as patient is only 8wks. RN informed patient this can be done when patient is 10 weeks or more.     HISTORY:   Planned Pregnancy: Yes  Marital Status: Single  Occupation: unemployed  Living in Household: Children Only and Parents/Siblings    Past History:  Her past medical history is non-contributory.      She has a history of   delivered  at term uncomplicated (at home). Patient has Hx of postpartum depression. Hx of mental health disorders.     Since her last LMP she denies use of alcohol, tobacco and street drugs.    Past medical, surgical, social and family history were reviewed and updated in [facility].        Current Outpatient Medications   Medication Sig Dispense Refill    ondansetron (ZOFRAN ODT) 4 MG ODT tab Take 1 tablet (4 mg) by mouth every 8 hours as needed for nausea. 20 tablet 1    Prenatal MV-Min-Fe Fum-FA-DHA (PRENATAL MULTIVITAMIN PLUS DHA) 27-0.8-250 MG CAPS Take 1 tablet by mouth daily. 30 capsule 12    senna-docusate  "(SENOKOT-S/PERICOLACE) 8.6-50 MG tablet Take 1 tablet by mouth 2 times daily. Start with 1 tablet PO BID, reduce to 1 tablet daily when having daily BMs. Stop for loose stools. 60 tablet 6    albuterol (PROAIR HFA/PROVENTIL HFA/VENTOLIN HFA) 108 (90 Base) MCG/ACT inhaler Inhale 2 puffs into the lungs every 6 hours as needed for shortness of breath, wheezing or cough (Patient not taking: Reported on 9/23/2024) 18 g 2    escitalopram (LEXAPRO) 5 MG tablet Take 0.5 tablets (2.5 mg) by mouth daily (Patient not taking: Reported on 9/23/2024) 15 tablet 0     No current facility-administered medications for this visit.       ROS:   12 point review of systems negative other than symptoms noted below or in the HPI.      OBJECTIVE:     EXAM:  /68 (BP Location: Left arm, Patient Position: Sitting, Cuff Size: Adult Regular)   Pulse 80   Temp 97.4  F (36.3  C) (Oral)   Resp 16   Ht 1.592 m (5' 2.68\")   Wt 48 kg (105 lb 12 oz)   LMP 07/28/2024 (Exact Date)   SpO2 98%   Breastfeeding No   BMI 18.93 kg/m   Body mass index is 18.93 kg/m .      ASSESSMENT/PLAN:       ICD-10-CM    1. Prenatal care, subsequent pregnancy, unspecified trimester  Z34.80 ABO/Rh type and screen     Hepatitis B surface antigen     CBC with platelets     HIV Antigen Antibody Combo     Rubella Antibody IgG     Treponema Abs w Reflex to RPR and Titer     Urine Culture Aerobic Bacterial     UA with Micro     Lead, Venous Blood     Prenatal MV-Min-Fe Fum-FA-DHA (PRENATAL MULTIVITAMIN PLUS DHA) 27-0.8-250 MG CAPS     ondansetron (ZOFRAN ODT) 4 MG ODT tab     senna-docusate (SENOKOT-S/PERICOLACE) 8.6-50 MG tablet     US OB < 14 weeks, single,  for dating ([id number])     Hepatitis C Screen Reflex to HCV RNA Quant and Genotype     Chlamydia trachomatis/Neisseria gonorrhoeae by PCR - Clinic Collect     ABO/Rh type and screen     Hepatitis B surface antigen     CBC with platelets     Rubella Antibody IgG     Treponema Abs w Reflex to RPR and Titer     Lead, " Venous Blood     Hepatitis C Screen Reflex to HCV RNA Quant and Genotype     HIV Antigen Antibody Combo     Urine Culture Aerobic Bacterial     UA with Micro      2. Encounter for pregnancy test  Z32.00 HCG qualitative urine     HCG qualitative urine      3. Nausea and vomiting in pregnancy  O21.9 ondansetron (ZOFRAN ODT) 4 MG ODT tab      4. Constipation during pregnancy  O99.619 senna-docusate (SENOKOT-S/PERICOLACE) 8.6-50 MG tablet    K59.00       5. Screen for STD (sexually transmitted disease)  Z11.3 HIV Antigen Antibody Combo     Treponema Abs w Reflex to RPR and Titer     Hepatitis C Screen Reflex to HCV RNA Quant and Genotype     Chlamydia trachomatis/Neisseria gonorrhoeae by PCR - Clinic Collect     Treponema Abs w Reflex to RPR and Titer     Hepatitis C Screen Reflex to HCV RNA Quant and Genotype     HIV Antigen Antibody Combo          23 year old , Unknown weeks of pregnancy with SOURAV of Not found.    Discussed as follows:     New Prenatal Education  during nurse intake    Medications- Prenatal Vitamin, recommend one with DHA as this helps with development of eyes and brain, no specific brand recommendation   Water Intake-  ounces daily   Weight- monitored at each appointment   Common Symptoms- may experience shortness of breath, increased heart rate, nausea/vomiting, headaches, cramping, spotting, etc.  If symptoms not improved and or worsening, contact provider.        - Only take Tylenol (acetaminophen) for headaches/pain concerns   - Review remedies & OTC medication to help with common symptoms in pregnancy (see taking medication during pregnancy handout)     [place]- baby is protected, not uncommon to have light cramping or spotting, reach out if persisting or worsening    Diet   - Wash fruits and vegetables before eating raw or cooking   - Avoid unpasteurized products   - Avoid undercooked meat, poultry, fish (no raw fish) and eggs    - Reheat hot dogs and luncheon meats/cold cuts  prior to consumption     Caffeine- limit to 200 mg/day (about 1.5 cups of coffee)   Abstain from smoking, alcohol, and drugs      Travel     - No travel 4-6 weeks out from due date   - Planes/lengthy drives- get up and walk every 1-2 hours for circulation, increased risk for DVT during pregnancy   - Seat belts- wear underneath belly not across it   - Air bags- back up seat      Disease and Infection     Risk of toxoplasmosis with cats  feces, have someone else change litter box during pregnancy, wear gloves when working outside and wash hands thoroughly   Avoid traveling to locations high risk for zika, use insect repellent, wear long sleeves and pants   Flu vaccine during flu season, COVID vaccine and TDAP   TDAP recommended with each pregnancy, make sure partner is up to date as well (usually only once every 10 years)      Frequency of Appointments- unless advised otherwise   Every 4 weeks until 28 weeks   Every 2 weeks until 36 weeks   Weekly until delivery       PLAN/PATIENT INSTRUCTIONS:    - Hepatitis C Antibody - Lead Venous Blood  - Urine Culture  - Urine Macroscopic with reflex to micro and culture  - Hep C screening reflex w/ HCV RNA quant and genotype  - Treponema Abs W Reflex TO RPR and Titer  - Rubella Antibody IGG  - HIV Antigen Antibody Combo  - CBC with Platelets  - Hepatitis B Surface Antigen  - ABO/RH Type and Scsreen  - Chlamydia Trachomatis/Neisseria Gonorrhea vaginal swab       Prenatal OB Questionnaire    Patient supplied answers from flow sheet for:  Prenatal OB Questionnaire.  Past Medical History  Have you ever recieved care for your mental health? : (!) Yes  Have you ever been in a major accident or suffered serious trauma?: No  Within the last year, has anyone hit, slapped, kicked or otherwise hurt you?: No  In the last year, has anyone forced you to have sex when you didn't want to?: No    Past Medical History 2   Have you ever received a blood transfusion?: No  Would you accept a blood  transfusion if was medically recommended?: (!) No  Does anyone in your home smoke?: No   Is your blood type Rh negative?: Unknown  Have you ever ?: (!) Yes  Have you been hospitalized for a nonsurgical reason excluding normal delivery?: Unknown  Have you ever had an abnormal pap smear?: No    Past Medical History (Continued)  Do you have a history of abnormalities of the uterus?: No  Did your mother take MICA or any other hormones when she was pregnant with you?: No  Do you have any other problems we have not asked about which you feel may be important to this pregnancy?: No       Allergies as of 9/23/2024:    Allergies as of 09/23/2024 - Reviewed 09/23/2024   Allergen Reaction Noted    Azithromycin Hives 01/29/2008    Doxycycline Hives 02/08/2023    Zithromax [azithromycin] Hives 11/22/2021       Current medications are:  Current Outpatient Medications   Medication Sig Dispense Refill    ondansetron (ZOFRAN ODT) 4 MG ODT tab Take 1 tablet (4 mg) by mouth every 8 hours as needed for nausea. 20 tablet 1    Prenatal MV-Min-Fe Fum-FA-DHA (PRENATAL MULTIVITAMIN PLUS DHA) 27-0.8-250 MG CAPS Take 1 tablet by mouth daily. 30 capsule 12    senna-docusate (SENOKOT-S/PERICOLACE) 8.6-50 MG tablet Take 1 tablet by mouth 2 times daily. Start with 1 tablet PO BID, reduce to 1 tablet daily when having daily BMs. Stop for loose stools. 60 tablet 6    albuterol (PROAIR HFA/PROVENTIL HFA/VENTOLIN HFA) 108 (90 Base) MCG/ACT inhaler Inhale 2 puffs into the lungs every 6 hours as needed for shortness of breath, wheezing or cough (Patient not taking: Reported on 9/23/2024) 18 g 2    escitalopram (LEXAPRO) 5 MG tablet Take 0.5 tablets (2.5 mg) by mouth daily (Patient not taking: Reported on 9/23/2024) 15 tablet 0         Janine Bermeo, MSN, RN   [facility]

## 2024-09-24 LAB
BACTERIA UR CULT: NORMAL
C TRACH DNA SPEC QL PROBE+SIG AMP: NEGATIVE
LEAD BLDV-MCNC: <2 UG/DL
N GONORRHOEA DNA SPEC QL NAA+PROBE: NEGATIVE
RUBV IGG SERPL QL IA: 6 INDEX
RUBV IGG SERPL QL IA: POSITIVE
T PALLIDUM AB SER QL: NONREACTIVE

## 2024-10-07 ENCOUNTER — MYC REFILL (OUTPATIENT)
Dept: FAMILY MEDICINE | Facility: CLINIC | Age: 24
End: 2024-10-07
Payer: COMMERCIAL

## 2024-10-07 DIAGNOSIS — O21.9 NAUSEA AND VOMITING IN PREGNANCY: ICD-10-CM

## 2024-10-07 DIAGNOSIS — Z34.80 PRENATAL CARE, SUBSEQUENT PREGNANCY, UNSPECIFIED TRIMESTER: ICD-10-CM

## 2024-10-07 RX ORDER — ONDANSETRON 4 MG/1
4 TABLET, ORALLY DISINTEGRATING ORAL EVERY 8 HOURS PRN
Qty: 20 TABLET | Refills: 1 | Status: SHIPPED | OUTPATIENT
Start: 2024-10-07 | End: 2024-10-15

## 2024-10-15 ENCOUNTER — PRENATAL OFFICE VISIT (OUTPATIENT)
Dept: FAMILY MEDICINE | Facility: CLINIC | Age: 24
End: 2024-10-15
Payer: COMMERCIAL

## 2024-10-15 VITALS
RESPIRATION RATE: 24 BRPM | DIASTOLIC BLOOD PRESSURE: 50 MMHG | TEMPERATURE: 98.1 F | HEIGHT: 63 IN | HEART RATE: 89 BPM | SYSTOLIC BLOOD PRESSURE: 94 MMHG | BODY MASS INDEX: 18.97 KG/M2 | OXYGEN SATURATION: 98 % | WEIGHT: 107.08 LBS

## 2024-10-15 DIAGNOSIS — O21.9 NAUSEA AND VOMITING IN PREGNANCY: ICD-10-CM

## 2024-10-15 DIAGNOSIS — R41.9 COGNITIVE COMPLAINTS: ICD-10-CM

## 2024-10-15 DIAGNOSIS — F41.1 GENERALIZED ANXIETY DISORDER: ICD-10-CM

## 2024-10-15 DIAGNOSIS — O09.91 SUPERVISION OF HIGH RISK PREGNANCY IN FIRST TRIMESTER: Primary | ICD-10-CM

## 2024-10-15 DIAGNOSIS — F33.1 MDD (MAJOR DEPRESSIVE DISORDER), RECURRENT EPISODE, MODERATE (H): ICD-10-CM

## 2024-10-15 DIAGNOSIS — Z59.00 HOUSING LACK: ICD-10-CM

## 2024-10-15 DIAGNOSIS — F10.21 ALCOHOL USE DISORDER, MODERATE, IN EARLY REMISSION (H): ICD-10-CM

## 2024-10-15 PROBLEM — O26.849 UTERINE SIZE DATE DISCREPANCY: Status: RESOLVED | Noted: 2023-07-17 | Resolved: 2024-10-15

## 2024-10-15 PROBLEM — F32.9 MAJOR DEPRESSION: Status: RESOLVED | Noted: 2023-07-17 | Resolved: 2024-10-15

## 2024-10-15 PROBLEM — F41.9 ANXIETY: Status: RESOLVED | Noted: 2023-07-17 | Resolved: 2024-10-15

## 2024-10-15 PROBLEM — O26.10 LOW WEIGHT GAIN DURING PREGNANCY: Status: RESOLVED | Noted: 2023-07-17 | Resolved: 2024-10-15

## 2024-10-15 LAB
EST. AVERAGE GLUCOSE BLD GHB EST-MCNC: 100 MG/DL
HBA1C MFR BLD: 5.1 % (ref 0–5.6)

## 2024-10-15 PROCEDURE — 83036 HEMOGLOBIN GLYCOSYLATED A1C: CPT | Performed by: FAMILY MEDICINE

## 2024-10-15 PROCEDURE — 36415 COLL VENOUS BLD VENIPUNCTURE: CPT | Performed by: FAMILY MEDICINE

## 2024-10-15 PROCEDURE — 99214 OFFICE O/P EST MOD 30 MIN: CPT | Performed by: FAMILY MEDICINE

## 2024-10-15 RX ORDER — ESCITALOPRAM OXALATE 5 MG/1
2.5 TABLET ORAL DAILY
Qty: 15 TABLET | Refills: 1 | Status: SHIPPED | OUTPATIENT
Start: 2024-10-15

## 2024-10-15 RX ORDER — ONDANSETRON 4 MG/1
4 TABLET, ORALLY DISINTEGRATING ORAL EVERY 8 HOURS PRN
Qty: 30 TABLET | Refills: 1 | Status: SHIPPED | OUTPATIENT
Start: 2024-10-15 | End: 2024-11-12

## 2024-10-15 SDOH — ECONOMIC STABILITY - HOUSING INSECURITY: HOMELESSNESS UNSPECIFIED: Z59.00

## 2024-10-15 NOTE — PROGRESS NOTES
PRENATAL VISIT:  INITIAL OB    Assessment /Plan     Kevin Urena is a 23 year old  at 10w3d with an EDC of 5/10/2025, by Ultrasound, here for Initial OB Appointment.    Kevin was seen today for prenatal care, mental health problem and recheck medication.    Diagnoses and all orders for this visit:    Supervision of high risk pregnancy in first trimester  -     Myriad Non-Invasive Prenatal Screening-Prequel; Future  -     Hemoglobin A1c; Future    Nausea and vomiting in pregnancy  -     ondansetron (ZOFRAN ODT) 4 MG ODT tab; Take 1 tablet (4 mg) by mouth every 8 hours as needed for nausea.    Generalized anxiety disorder  -     Adult Mental Health  Referral; Future  -     escitalopram (LEXAPRO) 5 MG tablet; Take 0.5 tablets (2.5 mg) by mouth daily.  -     Primary Care - Care Coordination Referral; Future    MDD (major depressive disorder), recurrent episode, moderate (H)  -     Adult Mental Health  Referral; Future  -     escitalopram (LEXAPRO) 5 MG tablet; Take 0.5 tablets (2.5 mg) by mouth daily.  -     Primary Care - Care Coordination Referral; Future    Cognitive complaints  -     Adult Mental Health  Referral; Future  -     Primary Care - Care Coordination Referral; Future    Housing lack  -     Primary Care - Care Coordination Referral; Future    Alcohol use disorder, moderate, in early remission (H)         Will use the following method to dates:  7w2d US not c/w LMP  Discussed importance of regular prenatal exams, nutrition, exercise, recommended weight gain, common first trimester concerns (nausea, vomiting, constipation, fatigue, GERD, urinary frequency), and warning signs for miscarriage and  labor (bleeding, cramping).  Started/continued prenatal vitamins.  Counseled on smoking cessation (if indicated) and abstinence from alcohol. Congratulated her on quitting drinking.  Reviewed medications for those contraindicated in pregnancy.  Prenatal labs  completed.  Offered NonInvasive Prenatal Screening (NIPS/[facility]).  After discussion of risk/benefits/limitations, patient accepted NIPS. She declined carrier screening.  Notable risk factors:   depression and Closely spaced pregnancies  Preeclampsia risk factors (per ACOG Dec 2021 Guidelines):  High risk factors (1+): none  Moderate risk factors (2+): Black race (as a proxy for underlying racism) and Lower income  Based on her risk factors, Kevin is at high risk of preeclampsia (1+ high risk factor or 2+ moderate risk factors).  We discussed the recommendation to initiate low-dose aspirin (81 mg/day) prophylaxis between 12 weeks and 28 weeks of gestation (optimally before 16 weeks) and continue daily until delivery and at this point she declines.  She plans to restart lexapro 2.5mg daily for depression. She wishes to establish care with a different psychiatrist and therapist. Discussed [facility] and [facility] for therapist. She would also benefit from neuropsych testing to help clarify her diagnosis. [facility] Referred to care coordination for assistance with connecting to these resouces.  Referred to care coordination for assistance with housing. She does have a place to stay currently.  Care coordination should also be able to set her up with PHN and ensure she has other things she will need for baby such as car seat and crib.  Continue zofran prn nausea.      Return to clinic in about 1 month for next OB visit.        Subjective:    Kevin Urena is a 23 year old  here today for her First Obstetrical Exam.     Mendez--milad Bledsoe  13mos Kervin Cyrus - home birth which was planned; clinic was [facility] - records available in chart  He was out within 5 pushes  It looks like she started prenatal care late with midwives, then transferred care to another group. She says about a week before his due date she decided she wanted a home birth.  Thenafter his birth she  "\"dealt with things in a way I didn't want to\" - turned to alcohol and drugs. She was in [facility] (see below)  She thinks she has been diagnosed withADHD    Has noticed some difficulties - mom has autism and little brother has autism  Has always had a problem with problem solving, she doesn't feel like she can start things and finish them - for example trying to finish her GED  Was seeing a psychiatrist but didn't feel connected to her  Can't keep family close  Has seen several psychiatrists and never felt connected  Was taking lexapro but felt it was making her sleepy - so stopped; didn't take it long enough to help mood  Used hydroxyzine but made her depressed  In [facility] [place] -, so didn't take lexapro  Went to court, case dismissed  Forced her to stop breastfeeding  Hasn't met therapist since then      She has nausea and vomiting (once when she didn't take zofran). Takes zofran once a day and it helps.  She denies bleeding, cramping, abnormal vaginal discharge. No loss of fluid. She denies HA.   Denies dysuria.   Has constipation.  Gets weird shoulder aches    OB History:  OB History    Para Term  AB Living   3 1 1 0 1 1   SAB IAB Ectopic Multiple Live Births   0 1 0 0 1      # Outcome Date GA Lbr Broderick/2nd Weight Sex Type Anes PTL Lv   3 Current            2 Term 23 38w6d  3.09 kg (6 lb 13 oz) M Vag-Spont None N STEPHY      Name: Kervin Bridges   1 IAB 05/15/22 10w1d             Birth Comments: pills, no comp     She does not have a history of  birth before 37 weeks with a pope gestation.  She does not have a history of preeclampsia in prior pregnancy. She does not have a history of GDM.  She does not have a history of PPH.  She does not have a history of recurrent (>2) pregnancy losses.  She does not have a history of Down's syndrome, sickle cell anemia or other genetic disorder affecting a child in her family.  She does have a history of major depression or postpartum " depression.  She does have a history of STI's - had Chlamydia and gonorrhea. No Hep B virus, syphilis. No h/o genital herpes.     Social Hx:   She has support from her mom (pushes her away a lot and she still comes back) and father of baby Rakan is involved. Rakan has other kids ages 11 and 15.  Lives with Rakan, toyin, and the other kids.  She is worried about eviction - has gotten behind since being in longterm. Has not been able to find a job. Has eviction court tomorrow - could be a week after tomorrow, most likely less than that. Could stay with mom if she needs to.  She has not used tobacco, has not used EtOH and has not used illicit drugs.  Last drink was September right before she found out she was pregnant. Previously drank daily, 4-6 dara cocktails. For a while had cravings to drink but not now. No withdrawal.  Smoked marijuana, is still doing that because it helps her depression/anxiety    Current Medications:   Current Outpatient Medications   Medication Sig Dispense Refill    escitalopram (LEXAPRO) 5 MG tablet Take 0.5 tablets (2.5 mg) by mouth daily. 15 tablet 1    ondansetron (ZOFRAN ODT) 4 MG ODT tab Take 1 tablet (4 mg) by mouth every 8 hours as needed for nausea. 30 tablet 1    Prenatal MV-Min-Fe Fum-FA-DHA (PRENATAL MULTIVITAMIN PLUS DHA) 27-0.8-250 MG CAPS Take 1 tablet by mouth daily. 30 capsule 11    albuterol (PROAIR HFA/PROVENTIL HFA/VENTOLIN HFA) 108 (90 Base) MCG/ACT inhaler Inhale 2 puffs into the lungs every 6 hours as needed for shortness of breath, wheezing or cough (Patient not taking: Reported on 9/23/2024) 18 g 2    senna-docusate (SENOKOT-S/PERICOLACE) 8.6-50 MG tablet Take 1 tablet by mouth 2 times daily. Start with 1 tablet PO BID, reduce to 1 tablet daily when having daily BMs. Stop for loose stools. (Patient not taking: Reported on 10/15/2024) 60 tablet 6           Past Medical History:   Diagnosis Date    ADHD (attention deficit hyperactivity disorder)     Anxiety      "Asthma     Created by Conversion     Autism     Depression      History reviewed. No pertinent surgical history.  Social History     Tobacco Use    Smoking status: Never     Passive exposure: Never    Smokeless tobacco: Never   Vaping Use    Vaping status: Never Used   Substance Use Topics    Alcohol use: Not Currently    Drug use: Not Currently     Allergies   Allergen Reactions    Azithromycin Hives    Doxycycline Hives    Zithromax [Azithromycin] Hives     States no issues with airway, breathing or swallowing concerns ever in the past       Family History   Problem Relation Age of Onset    Diabetes Mother     Cervical Cancer Mother         early 30's at diagnosis    Ovarian cysts Mother     No Known Problems Father     Diabetes Maternal Grandmother     Hyperlipidemia Maternal Grandmother     Heart Disease Maternal Grandmother     Breast Cancer Maternal Grandmother     Dementia Maternal Grandmother     Alzheimer Disease Paternal Grandmother     Asthma Sister     Allergies Sister        Objective:   Objective    Vitals:    10/15/24 0928   BP: 94/50   Pulse: 89   Resp: 24   Temp: 98.1  F (36.7  C)   TempSrc: Oral   SpO2: 98%   Weight: 48.6 kg (107 lb 1.3 oz)   Height: 1.598 m (5' 2.91\")     Physical Exam:  General Appearance: Alert, cooperative, no distress, appears stated age  Head: Normocephalic, without obvious abnormality, atraumatic  Eyes: PERRL, conjunctiva/corneas clear, EOM's intact  Ears: Normal TM's and external ear canals, both ears  Throat: Lips, mucosa, and tongue normal; teeth and gums normal  Neck: Supple, symmetrical, trachea midline, no adenopathy;  thyroid: not enlarged, symmetric, no tenderness/mass/nodules  Lungs: Clear to auscultation bilaterally, respirations unlabored  CV: RRR with normal S1 and S2, no murmurs  Breasts:  not examined.   Heart: Regular rate and rhythm, S1 and S2 normal, no murmur, rub, or gallop.  Abdomen: Soft, non-tender, bowel sounds active all four quadrants,  no masses, no " organomegaly  Pelvic: Deferred  Extremities: Extremities normal, atraumatic, no cyanosis or edema  Skin: Skin color, texture, turgor normal, no rashes or lesions  Lymph nodes: Cervical, supraclavicular, and axillary nodes normal  Neurologic: No focal deficits  FHT: 163

## 2024-10-15 NOTE — PATIENT INSTRUCTIONS
Check out [email or web address] for therapists  Check out [facility] as well  You can ask for a free consultation - some therapists will give you 10-15 minutes for free so you can see if you are a good fit.    Our care coordination team will call you to help you set up neuropsychiatry testing and find a psychiatrist outside of [place] system.

## 2024-10-16 ENCOUNTER — PATIENT OUTREACH (OUTPATIENT)
Dept: CARE COORDINATION | Facility: CLINIC | Age: 24
End: 2024-10-16
Payer: COMMERCIAL

## 2024-10-16 NOTE — PROGRESS NOTES
Clinic Care Coordination Contact  [facility]/Voicemail    Clinical Data: Care Coordinator Outreach    Outreach Documentation Number of Outreach Attempt   10/16/2024   9:26 AM 1     CHW initial outreach, unable to reach and left message on patient's voicemail with call back information and requested return call.    Plan: Care Coordinator will try to reach patient again in 1-2 business days.

## 2024-10-17 ENCOUNTER — PATIENT OUTREACH (OUTPATIENT)
Dept: CARE COORDINATION | Facility: CLINIC | Age: 24
End: 2024-10-17
Payer: COMMERCIAL

## 2024-10-17 NOTE — PROGRESS NOTES
Clinic Care Coordination Contact  Community Health Worker Initial Outreach    CHW Initial Information Gathering:  Referral Source: PCP  Preferred Hospital: [facility]  [phone]  Preferred Urgent Care: [facility] [place], [phone]  Current living arrangement:: I live in a private home with family  Type of residence:: Private home - stairs  Community Resources: None  Supplies Currently Used at Home: None  Equipment Currently Used at Home: none  Informal Support system:: Family  No PCP office visit in Past Year: No  Transportation means:: Accessible car  CHW Additional Questions  If ED/Hospital discharge, follow-up appointment scheduled as recommended?: N/A  Medication changes made following ED/Hospital discharge?: N/A  MyChart active?: No  Patient agreeable to assistance with activating MyChart?: No    Patient accepts CC: Yes. Patient scheduled for assessment with CCC BLANCA on 10/29/2024 at 11:00 AM. Patient noted desire to discuss 22yo female with severe depression and alcohol use disorder in remission currently pregnant with due date 5/10/25. Referring because she is going to get evicted and needs housing resources for her and her 13mo son. She will be staying with her mom in interim. She also needs to be connected to a new psychiatrist and therapist and needs neuropsych testing. [facility], she may need help setting up. She has used [place] [facility] before and would like to try somewhere out of system - do you know of anywhere that accepts her insurance? I tried to give her some resources [facility] and [facility]..

## 2024-10-25 LAB — SCANNED LAB RESULT: NORMAL

## 2024-10-29 ENCOUNTER — PATIENT OUTREACH (OUTPATIENT)
Dept: CARE COORDINATION | Facility: CLINIC | Age: 24
End: 2024-10-29

## 2024-11-12 ENCOUNTER — MYC REFILL (OUTPATIENT)
Dept: FAMILY MEDICINE | Facility: CLINIC | Age: 24
End: 2024-11-12
Payer: COMMERCIAL

## 2024-11-12 DIAGNOSIS — K59.00 CONSTIPATION DURING PREGNANCY: ICD-10-CM

## 2024-11-12 DIAGNOSIS — O99.619 CONSTIPATION DURING PREGNANCY: ICD-10-CM

## 2024-11-12 DIAGNOSIS — F41.1 GENERALIZED ANXIETY DISORDER: ICD-10-CM

## 2024-11-12 DIAGNOSIS — O21.9 NAUSEA AND VOMITING IN PREGNANCY: ICD-10-CM

## 2024-11-12 DIAGNOSIS — F33.1 MDD (MAJOR DEPRESSIVE DISORDER), RECURRENT EPISODE, MODERATE (H): ICD-10-CM

## 2024-11-12 DIAGNOSIS — Z34.80 PRENATAL CARE, SUBSEQUENT PREGNANCY, UNSPECIFIED TRIMESTER: ICD-10-CM

## 2024-11-12 RX ORDER — PNV NO.95/FERROUS FUM/FOLIC AC 28MG-0.8MG
1 TABLET ORAL DAILY
Qty: 30 CAPSULE | Refills: 11 | OUTPATIENT
Start: 2024-11-12

## 2024-11-12 RX ORDER — AMOXICILLIN 250 MG
1 CAPSULE ORAL 2 TIMES DAILY
Qty: 60 TABLET | Refills: 6 | OUTPATIENT
Start: 2024-11-12

## 2024-11-12 RX ORDER — ONDANSETRON 4 MG/1
4 TABLET, ORALLY DISINTEGRATING ORAL EVERY 8 HOURS PRN
Qty: 30 TABLET | Refills: 1 | Status: SHIPPED | OUTPATIENT
Start: 2024-11-12

## 2024-11-14 RX ORDER — ESCITALOPRAM OXALATE 5 MG/1
2.5 TABLET ORAL DAILY
Qty: 15 TABLET | Refills: 1 | Status: SHIPPED | OUTPATIENT
Start: 2024-11-14

## 2024-11-15 ENCOUNTER — PATIENT OUTREACH (OUTPATIENT)
Dept: CARE COORDINATION | Facility: CLINIC | Age: 24
End: 2024-11-15
Payer: COMMERCIAL

## 2024-11-15 NOTE — PROGRESS NOTES
Clinic Care Coordination Contact      Pt no showed with CCSW. Will reach out to reschedule.     THERESA Adair, [facility]  Social Work Care Coordinator

## 2024-11-15 NOTE — LETTER
[facility] CARE COORDINATION  [facility]  November 15, 2024    Kevin Urena  [address] [place] [address]      Dear Kevin,    I am a clinic care coordinator who works with Physician Yana Ref-Primary with the [facility]. I have been trying to reach you recently to introduce Clinic Care Coordination. Below is a description of clinic care coordination and how I can further assist you.       The clinic care coordination team is made up of a registered nurse, , financial resource worker and community health worker who understand the health care system. The goal of clinic care coordination is to help you manage your health and improve access to the health care system. Our team works alongside your provider to assist you in determining your health and social needs. We can help you obtain health care and community resources, providing you with necessary information and education. We can work with you through any barriers and develop a care plan that helps coordinate and strengthen the communication between you and your care team.  Our services are voluntary and are offered without charge to you personally.    Please feel free to contact me with any questions or concerns regarding care coordination and what we can offer.      We are focused on providing you with the highest-quality healthcare experience possible.    Sincerely,     THERESA Adair, [facility]  Social Work Care Coordinator  [phone]          Pt self scheduled follow up appointment with cardio Dr Spears [phone] Feb 28, 2023 10AM.  Pt declined meds to bed by Vivo wants medications script send to her preferred pharmacy [facility] in [place].

## 2024-11-15 NOTE — PROGRESS NOTES
Clinic Care Coordination Contact  [facility]/Voicemail    Clinical Data: Care Coordinator Outreach    Outreach Documentation Number of Outreach Attempt   10/16/2024   9:26 AM 1   11/15/2024   1:32 PM 1       Left message on patient's voicemail with call back information and requested return call.      Plan: Care Coordinator sent care coordination introduction letter on 11/15 via [employer]. Care Coordinator will do no further outreaches at this time.    THERESA Adair, SW  Social Work Care Coordinator

## 2024-11-30 ENCOUNTER — OFFICE VISIT (OUTPATIENT)
Dept: URGENT CARE | Facility: URGENT CARE | Age: 24
End: 2024-11-30
Payer: COMMERCIAL

## 2024-11-30 VITALS
TEMPERATURE: 97 F | DIASTOLIC BLOOD PRESSURE: 52 MMHG | RESPIRATION RATE: 16 BRPM | SYSTOLIC BLOOD PRESSURE: 122 MMHG | HEIGHT: 61 IN | OXYGEN SATURATION: 98 % | BODY MASS INDEX: 20.2 KG/M2 | WEIGHT: 107 LBS | HEART RATE: 90 BPM

## 2024-11-30 DIAGNOSIS — R69 DIAGNOSIS UNKNOWN: Primary | ICD-10-CM

## 2024-11-30 PROCEDURE — 99207 PR NO CHG PAT LEFT NOT BEING SEEN: CPT | Performed by: FAMILY MEDICINE

## 2024-12-05 ENCOUNTER — PATIENT OUTREACH (OUTPATIENT)
Dept: CARE COORDINATION | Facility: CLINIC | Age: 24
End: 2024-12-05
Payer: COMMERCIAL

## 2024-12-05 NOTE — PROGRESS NOTES
Clinic Care Coordination Contact  Follow Up Progress Note     -CCC BLANCA was unable to reach patient 2x for phone assessment and patient is now back on [facility] SW scheduled on 12/18/2024 at 10:00 AM.   -Patient was informed to call back if she can not keep the appointment and patient is aware of the appointment with [facility] BLANCA.

## 2024-12-09 ENCOUNTER — PRENATAL OFFICE VISIT (OUTPATIENT)
Dept: FAMILY MEDICINE | Facility: CLINIC | Age: 24
End: 2024-12-09
Payer: COMMERCIAL

## 2024-12-09 VITALS
HEART RATE: 76 BPM | SYSTOLIC BLOOD PRESSURE: 95 MMHG | DIASTOLIC BLOOD PRESSURE: 59 MMHG | WEIGHT: 116 LBS | OXYGEN SATURATION: 97 % | HEIGHT: 61 IN | BODY MASS INDEX: 21.9 KG/M2 | RESPIRATION RATE: 12 BRPM | TEMPERATURE: 97.9 F

## 2024-12-09 DIAGNOSIS — R55 VASOVAGAL SYNCOPE: ICD-10-CM

## 2024-12-09 DIAGNOSIS — F41.1 GENERALIZED ANXIETY DISORDER: ICD-10-CM

## 2024-12-09 DIAGNOSIS — O09.92 SUPERVISION OF HIGH RISK PREGNANCY IN SECOND TRIMESTER: Primary | ICD-10-CM

## 2024-12-09 DIAGNOSIS — F33.1 MDD (MAJOR DEPRESSIVE DISORDER), RECURRENT EPISODE, MODERATE (H): ICD-10-CM

## 2024-12-09 DIAGNOSIS — B30.9 ACUTE VIRAL CONJUNCTIVITIS OF LEFT EYE: ICD-10-CM

## 2024-12-09 DIAGNOSIS — J45.20 MILD INTERMITTENT ASTHMA WITHOUT COMPLICATION: ICD-10-CM

## 2024-12-09 LAB
ATRIAL RATE - MUSE: 70 BPM
DIASTOLIC BLOOD PRESSURE - MUSE: NORMAL MMHG
INTERPRETATION ECG - MUSE: NORMAL
P AXIS - MUSE: 59 DEGREES
PR INTERVAL - MUSE: 150 MS
QRS DURATION - MUSE: 68 MS
QT - MUSE: 416 MS
QTC - MUSE: 449 MS
R AXIS - MUSE: 59 DEGREES
SYSTOLIC BLOOD PRESSURE - MUSE: NORMAL MMHG
T AXIS - MUSE: 30 DEGREES
VENTRICULAR RATE- MUSE: 70 BPM

## 2024-12-09 PROCEDURE — 99214 OFFICE O/P EST MOD 30 MIN: CPT | Mod: 25 | Performed by: FAMILY MEDICINE

## 2024-12-09 PROCEDURE — 99207 PR PRENATAL VISIT: CPT | Performed by: FAMILY MEDICINE

## 2024-12-09 PROCEDURE — 36415 COLL VENOUS BLD VENIPUNCTURE: CPT | Performed by: FAMILY MEDICINE

## 2024-12-09 PROCEDURE — 82105 ALPHA-FETOPROTEIN SERUM: CPT | Mod: 90 | Performed by: FAMILY MEDICINE

## 2024-12-09 PROCEDURE — 93005 ELECTROCARDIOGRAM TRACING: CPT | Performed by: FAMILY MEDICINE

## 2024-12-09 PROCEDURE — 99000 SPECIMEN HANDLING OFFICE-LAB: CPT | Performed by: FAMILY MEDICINE

## 2024-12-09 RX ORDER — BUDESONIDE AND FORMOTEROL FUMARATE DIHYDRATE 80; 4.5 UG/1; UG/1
AEROSOL RESPIRATORY (INHALATION)
Qty: 20.4 G | Refills: 11 | Status: SHIPPED | OUTPATIENT
Start: 2024-12-09

## 2024-12-09 RX ORDER — ALBUTEROL SULFATE 90 UG/1
2 INHALANT RESPIRATORY (INHALATION) EVERY 6 HOURS PRN
Qty: 18 G | Refills: 2 | Status: CANCELLED | OUTPATIENT
Start: 2024-12-09

## 2024-12-09 RX ORDER — ESCITALOPRAM OXALATE 5 MG/1
5 TABLET ORAL DAILY
Qty: 30 TABLET | Refills: 5 | Status: SHIPPED | OUTPATIENT
Start: 2024-12-09

## 2024-12-09 ASSESSMENT — PATIENT HEALTH QUESTIONNAIRE - PHQ9
SUM OF ALL RESPONSES TO PHQ QUESTIONS 1-9: 7
10. IF YOU CHECKED OFF ANY PROBLEMS, HOW DIFFICULT HAVE THESE PROBLEMS MADE IT FOR YOU TO DO YOUR WORK, TAKE CARE OF THINGS AT HOME, OR GET ALONG WITH OTHER PEOPLE: SOMEWHAT DIFFICULT
SUM OF ALL RESPONSES TO PHQ QUESTIONS 1-9: 7

## 2024-12-09 NOTE — PROGRESS NOTES
"SUBJECTIVE:  Kevin Urena  at 18w2d. Estimated Date of Delivery: May 10, 2025.  She is doing well. She denies vaginal bleeding, leakage of fluid. Has had some abdominal cramping and nausea/vomiting. Fetal movement is present!   Concerns:   Cough morning and nights, cold air; where she was staying made her sick - thinks she just needs inhaler again  In [place] at [facility] she would get cough morning and night  Substance and mental health treatment - was there for 3w; got kicked out for fighting with someone  Now just gets it when she lays down to go to sleep, gets tickle in back of throat and no matter how much she coughs it doesn't go away; no mucous  No wheezing, does seem to be getting better  Left eye red and itchy, and had some discharge; son had pink eye. No contacts. Has had for 3 days.  Mental health better on lexapro, taking 1/2 pill. No alcohol, no drugs. She self-admitted to [facility], but didn't find it helpful - wasn't helping her get the things she needed.  Got behind the wheel set up so going to work on 's license  Recently nausea in morning and evening - taking zofran less, only when needed  Fainted x1 - at [employer], trying to pay for something, someone told her something, vision started getting dark and was trying to slow breathing down, coulnd't hear people talking to her, heart was racing, was sweating, blinked and then woke up on floor, did not see EMS, went to urgent care but left. Said she felt really warm when it happened  This happened last week Wednesday, never happened before, never again.    ROS:  Negative except as noted above in HPI.    OBJECTIVE:  Vitals: BP 95/59   Pulse 76   Temp 97.9  F (36.6  C) (Oral)   Resp 12   Ht 1.549 m (5' 1\")   Wt 52.6 kg (116 lb)   LMP 2024 (Exact Date)   SpO2 97%   BMI 21.92 kg/m     Total weight gain:  Not found.   Exam: Per flowsheet  Wt Readings from Last 3 Encounters:   24 52.6 kg (116 lb)   24 " 48.5 kg (107 lb)   10/15/24 48.6 kg (107 lb 1.3 oz)   General: well-appearing, no acute distress  HEENT: normocephalic, atraumatic  Eyes: left conjunctival injection, no discharge  Neck: normal ROM  Cardiovascular: regular rate and rhythm, normal S1 and S2, no murmurs/rubs/gallops  Pulmonary: no increased work of breathing, clear to auscultation bilaterally, no wheezes/rales/rhonchi  Musculoskeletal: normal ROM, no deformity  Neurological: gross motor exam normal  Skin: no rashes or lesions         ASSESSMENT/PLAN:  Kevin was seen today for prenatal care, cough and syncope.    Diagnoses and all orders for this visit:    Supervision of high risk pregnancy in second trimester  -     US OB > 14 Weeks; Future  -     Alpha fetoprotein maternal screen; Future    Mild intermittent asthma without complication  -     budesonide-formoterol (SYMBICORT) 80-4.5 MCG/ACT Inhaler; Inhale 2 puffs once daily plus 1-2 puffs as needed. May use up to 12 puffs per day.    Generalized anxiety disorder  -     escitalopram (LEXAPRO) 5 MG tablet; Take 1 tablet (5 mg) by mouth daily.    MDD (major depressive disorder), recurrent episode, moderate (H)  -     escitalopram (LEXAPRO) 5 MG tablet; Take 1 tablet (5 mg) by mouth daily.    Vasovagal syncope  -     EKG 12-lead, tracing only      24 year old  at 18w2d  Dicussed risks/benefits/alternatives to QUAD/AFP testing and patient accepted.  Discussed fetal survey and order placed.  Having daily asthma symptoms, will step up therapy to Symbicort 2 puffs daily, start SMART therapy.  Anxiety and depressive symptoms moderately well-controlled on Lexapro.  Increase dose to 5 mg  Syncopal episode due to vasovagal syncope.  Check EKG given reported palpitations at that time of the event.  Overall low suspicion for cardiac or neurologic etiology.  RTC as scheduled or sooner with problems.       Answers submitted by the patient for this visit:  Patient Health Questionnaire (Submitted on  12/9/2024)  If you checked off any problems, how difficult have these problems made it for you to do your work, take care of things at home, or get along with other people?: Somewhat difficult  PHQ9 TOTAL SCORE: 7

## 2024-12-11 LAB
# FETUSES US: NORMAL
AFP MOM SERPL: 1.22
AFP SERPL-MCNC: 74 NG/ML
AGE - REPORTED: 24.5 YR
CURRENT SMOKER: NO
FAMILY MEMBER DISEASES HX: NO
GA METHOD: NORMAL
GA: NORMAL WK
IDDM PATIENT QL: NO
INTEGRATED SCN PATIENT-IMP: NORMAL
SPECIMEN DRAWN SERPL: NORMAL

## 2024-12-18 ENCOUNTER — PATIENT OUTREACH (OUTPATIENT)
Dept: NURSING | Facility: CLINIC | Age: 24
End: 2024-12-18
Payer: COMMERCIAL

## 2024-12-24 ENCOUNTER — HOSPITAL ENCOUNTER (OUTPATIENT)
Dept: ULTRASOUND IMAGING | Facility: HOSPITAL | Age: 24
Discharge: HOME | End: 2024-12-24
Attending: FAMILY MEDICINE | Admitting: FAMILY MEDICINE
Payer: COMMERCIAL

## 2024-12-24 DIAGNOSIS — O09.92 SUPERVISION OF HIGH RISK PREGNANCY IN SECOND TRIMESTER: ICD-10-CM

## 2024-12-24 PROCEDURE — 76805 OB US >/= 14 WKS SNGL FETUS: CPT

## 2025-01-13 ENCOUNTER — PRENATAL OFFICE VISIT (OUTPATIENT)
Dept: FAMILY MEDICINE | Facility: CLINIC | Age: 25
End: 2025-01-13
Payer: COMMERCIAL

## 2025-01-13 VITALS
RESPIRATION RATE: 16 BRPM | TEMPERATURE: 97.8 F | SYSTOLIC BLOOD PRESSURE: 108 MMHG | OXYGEN SATURATION: 98 % | BODY MASS INDEX: 22.86 KG/M2 | HEART RATE: 86 BPM | WEIGHT: 121.1 LBS | DIASTOLIC BLOOD PRESSURE: 68 MMHG | HEIGHT: 61 IN

## 2025-01-13 DIAGNOSIS — G47.09 OTHER INSOMNIA: ICD-10-CM

## 2025-01-13 DIAGNOSIS — J45.20 MILD INTERMITTENT ASTHMA WITHOUT COMPLICATION: ICD-10-CM

## 2025-01-13 DIAGNOSIS — H00.11 CHALAZION OF RIGHT UPPER EYELID: ICD-10-CM

## 2025-01-13 DIAGNOSIS — F10.21 ALCOHOL USE DISORDER, MODERATE, IN EARLY REMISSION (H): ICD-10-CM

## 2025-01-13 DIAGNOSIS — N89.8 VAGINAL DISCHARGE: ICD-10-CM

## 2025-01-13 DIAGNOSIS — F41.1 GENERALIZED ANXIETY DISORDER: ICD-10-CM

## 2025-01-13 DIAGNOSIS — Z34.80 PRENATAL CARE, SUBSEQUENT PREGNANCY, UNSPECIFIED TRIMESTER: Primary | ICD-10-CM

## 2025-01-13 DIAGNOSIS — B37.31 CANDIDA VAGINITIS: Primary | ICD-10-CM

## 2025-01-13 PROBLEM — F33.41 MAJOR DEPRESSIVE DISORDER, RECURRENT EPISODE, IN PARTIAL REMISSION: Status: ACTIVE | Noted: 2024-10-15

## 2025-01-13 LAB
ALBUMIN UR-MCNC: 30 MG/DL
APPEARANCE UR: CLEAR
BACTERIA #/AREA URNS HPF: ABNORMAL /HPF
BILIRUB UR QL STRIP: NEGATIVE
CLUE CELLS: ABNORMAL
COLOR UR AUTO: YELLOW
GLUCOSE UR STRIP-MCNC: NEGATIVE MG/DL
HGB UR QL STRIP: NEGATIVE
KETONES UR STRIP-MCNC: ABNORMAL MG/DL
LEUKOCYTE ESTERASE UR QL STRIP: ABNORMAL
MUCOUS THREADS #/AREA URNS LPF: PRESENT /LPF
NITRATE UR QL: NEGATIVE
PH UR STRIP: 7 [PH] (ref 5–7)
RBC #/AREA URNS AUTO: ABNORMAL /HPF
SP GR UR STRIP: 1.02 (ref 1–1.03)
SQUAMOUS #/AREA URNS AUTO: ABNORMAL /LPF
TRICHOMONAS, WET PREP: ABNORMAL
UROBILINOGEN UR STRIP-ACNC: 0.2 E.U./DL
WBC #/AREA URNS AUTO: ABNORMAL /HPF
WBC'S/HIGH POWER FIELD, WET PREP: ABNORMAL
YEAST, WET PREP: PRESENT

## 2025-01-13 PROCEDURE — 99207 PR PRENATAL VISIT: CPT | Performed by: FAMILY MEDICINE

## 2025-01-13 PROCEDURE — 87591 N.GONORRHOEAE DNA AMP PROB: CPT | Performed by: FAMILY MEDICINE

## 2025-01-13 PROCEDURE — 87491 CHLMYD TRACH DNA AMP PROBE: CPT | Performed by: FAMILY MEDICINE

## 2025-01-13 PROCEDURE — 87086 URINE CULTURE/COLONY COUNT: CPT | Performed by: FAMILY MEDICINE

## 2025-01-13 PROCEDURE — 87210 SMEAR WET MOUNT SALINE/INK: CPT | Performed by: FAMILY MEDICINE

## 2025-01-13 PROCEDURE — 99214 OFFICE O/P EST MOD 30 MIN: CPT | Mod: 25 | Performed by: FAMILY MEDICINE

## 2025-01-13 PROCEDURE — 81001 URINALYSIS AUTO W/SCOPE: CPT | Performed by: FAMILY MEDICINE

## 2025-01-13 RX ORDER — PNV NO.95/FERROUS FUM/FOLIC AC 28MG-0.8MG
1 TABLET ORAL DAILY
Qty: 30 CAPSULE | Refills: 11 | Status: SHIPPED | OUTPATIENT
Start: 2025-01-13

## 2025-01-13 RX ORDER — CLOTRIMAZOLE 1 %
1 CREAM WITH APPLICATOR VAGINAL AT BEDTIME
Qty: 45 G | Refills: 0 | Status: SHIPPED | OUTPATIENT
Start: 2025-01-13 | End: 2025-01-20

## 2025-01-13 ASSESSMENT — ASTHMA QUESTIONNAIRES: ACT_TOTALSCORE: 23

## 2025-01-13 ASSESSMENT — PATIENT HEALTH QUESTIONNAIRE - PHQ9: SUM OF ALL RESPONSES TO PHQ QUESTIONS 1-9: 5

## 2025-01-13 NOTE — RESULT ENCOUNTER NOTE
Labs show that you have a yeast infection, I will send a prescription for the anti-yeast cream called clotrimazole, which you should use once a day at bedtime for 7 days. Let me know if you have any questions.

## 2025-01-13 NOTE — PROGRESS NOTES
"SUBJECTIVE:  Kevin Urena  at 23w2d. Estimated Date of Delivery: May 10, 2025.  She is doing well. She denies abdominal pain/cramping, vaginal bleeding, leakage of fluid. Fetal movement is normal. She denies headache, vision changes, RUQ/epigastric pain, lower extremity edema.  Concerns:   Bump right upper eyelid - 2mos  Lexapro seems to be working - mood feels good, not suicidal, less worry  Hasn't started taking full tablet yet - still had some of the half tablet, then will go up  Looking for a job  Having some itchiness, no burning, and discharge  Asthma - new inhaler worked; sometimes more rarely now she wakes up with a cough - once every few weeks  Rosmery Rain - not sure on spelling - will call her Montse  No alcohol    ROS:  Negative except as noted above in HPI.    OBJECTIVE:  Vitals: /68 (BP Location: Left arm, Patient Position: Left side, Cuff Size: Adult Regular)   Pulse 86   Temp 97.8  F (36.6  C) (Oral)   Resp 16   Ht 1.549 m (5' 1\")   Wt 54.9 kg (121 lb 1.6 oz)   LMP 2024 (Exact Date)   SpO2 98%   BMI 22.88 kg/m     Total weight gain:  Not found.   Wt Readings from Last 3 Encounters:   25 54.9 kg (121 lb 1.6 oz)   24 52.6 kg (116 lb)   24 48.5 kg (107 lb)   Exam: Per flowsheet  Eyes: conjuntivae and sclerae normal, right upper eyelid with mobile nodule    ASSESSMENT/PLAN:  Kevin was seen today for prenatal care, stye on the right eye and possiblr uti.    Diagnoses and all orders for this visit:    Prenatal care, subsequent pregnancy, unspecified trimester  -     Prenatal MV-Min-Fe Fum-FA-DHA (PRENATAL MULTIVITAMIN PLUS DHA) 27-0.8-250 MG CAPS; Take 1 tablet by mouth daily.    Other insomnia  -     doxylamine (UNISOM) 25 MG TABS tablet; Take 1 tablet (25 mg) by mouth nightly as needed for sleep.    Chalazion of right upper eyelid  -     Adult Eye  Referral; Future    Vaginal discharge  -     UA Macroscopic with reflex to Microscopic and " Culture - Lab Collect; Future  -     Wet prep - Clinic Collect  -     Chlamydia trachomatis/Neisseria gonorrhoeae by PCR - Clinic Collect  -     UA Macroscopic with reflex to Microscopic and Culture - Lab Collect  -     Urine Microscopic Exam  -     Urine Culture    Alcohol use disorder, moderate, in early remission (H)    Generalized anxiety disorder    Mild intermittent asthma without complication      24 year old  at 23w2d   Fetal survey was done and was sergio; fetus was breech.   Start unisom prn insomnia  Continu lexapro - will uptitrate to 5mg.  Continue abstinence from alcohol  Asthma: Symptoms well controlled. Now using symbicort prn, continue.   Check labs for vaginal discharge  Referred to ophthalmology for chalazion  Reviewed signs/symptoms of preeclampsia and  labor.  RTC as scheduled or sooner with problems.

## 2025-01-14 LAB
BACTERIA UR CULT: NORMAL
C TRACH DNA SPEC QL PROBE+SIG AMP: NEGATIVE
N GONORRHOEA DNA SPEC QL NAA+PROBE: NEGATIVE
SPECIMEN TYPE: NORMAL

## 2025-01-15 ENCOUNTER — PATIENT OUTREACH (OUTPATIENT)
Dept: CARE COORDINATION | Facility: CLINIC | Age: 25
End: 2025-01-15
Payer: COMMERCIAL

## 2025-01-15 NOTE — PROGRESS NOTES
Clinic Care Coordination Contact  Community Health Worker Follow Up    -Per chart review, CCC assessment has not been completed, [facility] was able to reach patient on 12/18/2024 but patient asked to call back at noon and there's no note found in regard to request.   -CHW was able to reach patient today and patient is still needing to speak with [facility] SW therefor, patient is back on [facility] SW's schedule to complete assessment on 2/05/2025 at 10:00 AM.   -Per patient, she's okay for the appointment with [facility] as it is scheduled for.  -Patient was informed to call with questions or concerns.       CHW Next Outreach: Post [facility] assessment.

## 2025-01-22 ENCOUNTER — TELEPHONE (OUTPATIENT)
Dept: FAMILY MEDICINE | Facility: CLINIC | Age: 25
End: 2025-01-22
Payer: COMMERCIAL

## 2025-01-22 NOTE — TELEPHONE ENCOUNTER
Forms/Letter Request    Type of form/letter: OTHER: Proof of pregnancy letter for [facility]        Do we have the form/letter: No      When is form/letter needed by: ASAP. Patient is applying for [facility] benefit and they are needing a proof that patient is pregnant and the SOURAV.     How would you like the form/letter returned: [employer]    Patient Notified form requests are processed in 5-7 business days:Yes    Could we send this information to you in [employer] or would you prefer to receive a phone call?:   Patient would prefer a phone call   Okay to leave a detailed message?: No at Cell number on file:    Telephone Information:   Mobile [phone]

## 2025-01-22 NOTE — LETTER
2025      Kayleenlucy Urena  [address]  [place] [address]        To Whom It May Concern,     Kevin Urena ( 2000) is pregnant. Her estimated due date is 5/10/2025. Please call with any questions.      Sincerely,        Rafia Hess MD    Electronically signed

## 2025-02-03 ENCOUNTER — PRENATAL OFFICE VISIT (OUTPATIENT)
Dept: FAMILY MEDICINE | Facility: CLINIC | Age: 25
End: 2025-02-03
Payer: COMMERCIAL

## 2025-02-03 VITALS
WEIGHT: 122.06 LBS | TEMPERATURE: 98.6 F | OXYGEN SATURATION: 98 % | SYSTOLIC BLOOD PRESSURE: 107 MMHG | RESPIRATION RATE: 12 BRPM | HEART RATE: 87 BPM | BODY MASS INDEX: 23.05 KG/M2 | HEIGHT: 61 IN | DIASTOLIC BLOOD PRESSURE: 72 MMHG

## 2025-02-03 DIAGNOSIS — F33.41 MAJOR DEPRESSIVE DISORDER, RECURRENT EPISODE, IN PARTIAL REMISSION: ICD-10-CM

## 2025-02-03 DIAGNOSIS — F41.1 GENERALIZED ANXIETY DISORDER: ICD-10-CM

## 2025-02-03 DIAGNOSIS — G47.09 OTHER INSOMNIA: ICD-10-CM

## 2025-02-03 DIAGNOSIS — J45.20 MILD INTERMITTENT ASTHMA WITHOUT COMPLICATION: ICD-10-CM

## 2025-02-03 DIAGNOSIS — S39.013A STRAIN OF MUSCLE, FASCIA AND TENDON OF PELVIS, INITIAL ENCOUNTER: ICD-10-CM

## 2025-02-03 DIAGNOSIS — O09.92 SUPERVISION OF HIGH RISK PREGNANCY IN SECOND TRIMESTER: Primary | ICD-10-CM

## 2025-02-03 PROBLEM — O09.93 PREGNANCY, SUPERVISION, HIGH-RISK, THIRD TRIMESTER: Status: RESOLVED | Noted: 2023-07-17 | Resolved: 2025-02-03

## 2025-02-03 LAB
ERYTHROCYTE [DISTWIDTH] IN BLOOD BY AUTOMATED COUNT: 13.1 % (ref 10–15)
FERRITIN SERPL-MCNC: 39 NG/ML (ref 6–175)
GLUCOSE 1H P 50 G GLC PO SERPL-MCNC: 91 MG/DL (ref 70–129)
HCT VFR BLD AUTO: 35.8 % (ref 35–47)
HGB BLD-MCNC: 12.4 G/DL (ref 11.7–15.7)
HOLD SPECIMEN: NORMAL
IRON BINDING CAPACITY (ROCHE): 359 UG/DL (ref 240–430)
IRON SATN MFR SERPL: 9 % (ref 15–46)
IRON SERPL-MCNC: 34 UG/DL (ref 37–145)
MCH RBC QN AUTO: 31.4 PG (ref 26.5–33)
MCHC RBC AUTO-ENTMCNC: 34.6 G/DL (ref 31.5–36.5)
MCV RBC AUTO: 91 FL (ref 78–100)
PLATELET # BLD AUTO: 232 10E3/UL (ref 150–450)
RBC # BLD AUTO: 3.95 10E6/UL (ref 3.8–5.2)
T PALLIDUM AB SER QL: NONREACTIVE
WBC # BLD AUTO: 6.7 10E3/UL (ref 4–11)

## 2025-02-03 PROCEDURE — 83550 IRON BINDING TEST: CPT | Performed by: FAMILY MEDICINE

## 2025-02-03 PROCEDURE — 99207 PR PRENATAL VISIT: CPT | Performed by: FAMILY MEDICINE

## 2025-02-03 PROCEDURE — 99214 OFFICE O/P EST MOD 30 MIN: CPT | Mod: 25 | Performed by: FAMILY MEDICINE

## 2025-02-03 PROCEDURE — 86780 TREPONEMA PALLIDUM: CPT | Performed by: FAMILY MEDICINE

## 2025-02-03 PROCEDURE — 82728 ASSAY OF FERRITIN: CPT | Performed by: FAMILY MEDICINE

## 2025-02-03 PROCEDURE — 83540 ASSAY OF IRON: CPT | Performed by: FAMILY MEDICINE

## 2025-02-03 PROCEDURE — 36415 COLL VENOUS BLD VENIPUNCTURE: CPT | Performed by: FAMILY MEDICINE

## 2025-02-03 PROCEDURE — 82950 GLUCOSE TEST: CPT | Performed by: FAMILY MEDICINE

## 2025-02-03 PROCEDURE — 85027 COMPLETE CBC AUTOMATED: CPT | Performed by: FAMILY MEDICINE

## 2025-02-03 ASSESSMENT — PATIENT HEALTH QUESTIONNAIRE - PHQ9: SUM OF ALL RESPONSES TO PHQ QUESTIONS 1-9: 5

## 2025-02-03 NOTE — PROGRESS NOTES
"SUBJECTIVE:  Kevin Urena  at 26w2d. Estimated Date of Delivery: May 10, 2025.  She is doing well. She denies abdominal pain/cramping, vaginal bleeding, leakage of fluid. Fetal movement is normal. She denies headache, vision changes, RUQ/epigastric, pain, lower extremity edema.  Concerns:   Cough - yesterday had a really bad cough, inhaler made it better  Has had a runny nose and bad soreness of neck, hurt throat when she was coughing as well  Has had this all less than a week  No fevers  No shortness of breath  Son also has a cough  Threw up a few times last week when she felt baby moving, then felt fine  Leaks urine when she laughs - onetime had lots of fluid, and she didn't remember laughing, but no more leaking  Hasn't tried unisom but she was wondering if they didn't have it  Taking lexapro, full 5mg pill, feels like no anger or anxiety right now  Has lower abdomen/pelvis pain and would like a belly band  Would like a water birth    ROS:  Negative except as noted above in HPI.    OBJECTIVE:  Vitals: /72 (BP Location: Right arm, Patient Position: Sitting, Cuff Size: Adult Regular)   Pulse 87   Temp 98.6  F (37  C) (Oral)   Resp 12   Ht 1.549 m (5' 1\")   Wt 55.4 kg (122 lb 1 oz)   LMP 2024 (Exact Date)   SpO2 98%   BMI 23.06 kg/m     Total weight gain:  7.399 kg (16 lb 5 oz)   Exam: Per flowsheet  General: well-appearing, no acute distress  HEENT: normocephalic, atraumatic  Eyes: conjunctivae normal  Neck: normal ROM  Cardiovascular: regular rate and rhythm, normal S1 and S2, no murmurs/rubs/gallops  Pulmonary: no increased work of breathing, clear to auscultation bilaterally, no wheezes/rales/rhonchi  Musculoskeletal: normal ROM, no deformity  Neurological: gross motor exam normal  Skin: no rashes or lesions       ASSESSMENT/PLAN:  Kevin was seen today for prenatal care and cough.    Diagnoses and all orders for this visit:    Supervision of high risk pregnancy in second " trimester  -     Glucose tolerance, gest screen, 1 hour; Future  -     Treponema Abs w Reflex to RPR and Titer; Future  -     CBC with Platelets and Reflex to Iron Studies; Future    Other insomnia  -     doxylamine (UNISOM) 25 MG TABS tablet; Take 1 tablet (25 mg) by mouth nightly as needed for sleep.    Strain of muscle, fascia and tendon of pelvis, initial encounter  -     OB/Maternity Back Brace      24 year old  at 26w2d   Cough: due to viral URI/upper airway cough syndrome. Restart flonase, take symbicort scheduled for next few days, and can trial honey prn cough as well. Lung exam clear, no concern for asthma exacerbation.  Anxiety and depression: continue lexapro 5mg daily  Trial unisom prn - advised can get over the counter if no covered by insurance  Start back brace, provided today  F2F helped schedule eye referral today  Informed patient that unfortunately I do not do water births, but will see what we can do to transfer her care to someone who does  1h GTT, CBC, and syphilis today  RTC as scheduled or sooner with problems.         DME (Durable Medical Equipment) Orders and Documentation  Orders Placed This Encounter   Procedures    OB/Maternity Back Brace      The patient was assessed and it was determined the patient is in need of the following listed DME Supplies/Equipment. Please complete supporting documentation below to demonstrate medical necessity.

## 2025-02-04 NOTE — RESULT ENCOUNTER NOTE
Your labs were normal.  No gestational diabetes.  Your hemoglobin is normal; we want it to be 10.5 or higher during the second trimester of pregnancy. You do not have syphilis.

## 2025-02-05 ENCOUNTER — PATIENT OUTREACH (OUTPATIENT)
Dept: NURSING | Facility: CLINIC | Age: 25
End: 2025-02-05
Payer: COMMERCIAL

## 2025-02-05 NOTE — LETTER
[facility] CARE COORDINATION  [facility]  February 5, 2025    Kevin Urena  [address] [place] [address]      Dear Kevin,    I am a clinic care coordinator who works with Rafia Hess MD with the [facility]. I have been trying to reach you recently to introduce Clinic Care Coordination. Below is a description of clinic care coordination and how I can further assist you.       The clinic care coordination team is made up of a registered nurse, , financial resource worker and community health worker who understand the health care system. The goal of clinic care coordination is to help you manage your health and improve access to the health care system. Our team works alongside your provider to assist you in determining your health and social needs. We can help you obtain health care and community resources, providing you with necessary information and education. We can work with you through any barriers and develop a care plan that helps coordinate and strengthen the communication between you and your care team.  Our services are voluntary and are offered without charge to you personally.    Please feel free to contact me with any questions or concerns regarding care coordination and what we can offer.      We are focused on providing you with the highest-quality healthcare experience possible.    Sincerely,     Aneta Panchal, THERESA, SW  Social Work Care Coordinator

## 2025-02-05 NOTE — PROGRESS NOTES
Clinic Care Coordination Contact  [facility]/Voicemail    Clinical Data: Care Coordinator Outreach    Outreach Documentation Number of Outreach Attempt   2/5/2025  10:19 AM 1       Left message on patient's voicemail with call back information and requested return call.      Plan: Care Coordinator will send unable to contact letter with care coordinator contact information via [employer]. Care Coordinator will try to reach patient again in 3-5 business days.    THERESA Adair, LGSW  Social Work Care Coordinator

## 2025-02-09 ENCOUNTER — MYC REFILL (OUTPATIENT)
Dept: FAMILY MEDICINE | Facility: CLINIC | Age: 25
End: 2025-02-09
Payer: COMMERCIAL

## 2025-02-09 DIAGNOSIS — J45.20 MILD INTERMITTENT ASTHMA WITHOUT COMPLICATION: ICD-10-CM

## 2025-02-09 DIAGNOSIS — Z34.80 PRENATAL CARE, SUBSEQUENT PREGNANCY, UNSPECIFIED TRIMESTER: ICD-10-CM

## 2025-02-09 DIAGNOSIS — F41.1 GENERALIZED ANXIETY DISORDER: ICD-10-CM

## 2025-02-09 DIAGNOSIS — O21.9 NAUSEA AND VOMITING IN PREGNANCY: ICD-10-CM

## 2025-02-09 DIAGNOSIS — F33.1 MDD (MAJOR DEPRESSIVE DISORDER), RECURRENT EPISODE, MODERATE (H): ICD-10-CM

## 2025-02-10 RX ORDER — ESCITALOPRAM OXALATE 5 MG/1
5 TABLET ORAL DAILY
Qty: 30 TABLET | Refills: 5 | OUTPATIENT
Start: 2025-02-10

## 2025-02-10 RX ORDER — ONDANSETRON 4 MG/1
4 TABLET, ORALLY DISINTEGRATING ORAL EVERY 8 HOURS PRN
Qty: 30 TABLET | Refills: 6 | Status: SHIPPED | OUTPATIENT
Start: 2025-02-10

## 2025-02-10 RX ORDER — PNV NO.95/FERROUS FUM/FOLIC AC 28MG-0.8MG
1 TABLET ORAL DAILY
Qty: 30 CAPSULE | Refills: 11 | OUTPATIENT
Start: 2025-02-10

## 2025-02-10 RX ORDER — BUDESONIDE AND FORMOTEROL FUMARATE DIHYDRATE 80; 4.5 UG/1; UG/1
AEROSOL RESPIRATORY (INHALATION)
Qty: 20.4 G | Refills: 11 | OUTPATIENT
Start: 2025-02-10

## 2025-02-24 ENCOUNTER — PRENATAL OFFICE VISIT (OUTPATIENT)
Dept: FAMILY MEDICINE | Facility: CLINIC | Age: 25
End: 2025-02-24
Payer: COMMERCIAL

## 2025-02-24 VITALS
DIASTOLIC BLOOD PRESSURE: 74 MMHG | OXYGEN SATURATION: 97 % | RESPIRATION RATE: 20 BRPM | WEIGHT: 129.2 LBS | HEART RATE: 113 BPM | BODY MASS INDEX: 24.39 KG/M2 | TEMPERATURE: 97.8 F | HEIGHT: 61 IN | SYSTOLIC BLOOD PRESSURE: 118 MMHG

## 2025-02-24 DIAGNOSIS — F10.21 ALCOHOL USE DISORDER, MODERATE, IN EARLY REMISSION (H): ICD-10-CM

## 2025-02-24 DIAGNOSIS — O09.93 SUPERVISION OF HIGH RISK PREGNANCY IN THIRD TRIMESTER: Primary | ICD-10-CM

## 2025-02-24 DIAGNOSIS — L98.9 SKIN LESION: ICD-10-CM

## 2025-02-24 DIAGNOSIS — F33.41 MAJOR DEPRESSIVE DISORDER, RECURRENT EPISODE, IN PARTIAL REMISSION: ICD-10-CM

## 2025-02-24 DIAGNOSIS — J45.20 MILD INTERMITTENT ASTHMA WITHOUT COMPLICATION: ICD-10-CM

## 2025-02-24 DIAGNOSIS — H00.11 CHALAZION OF RIGHT UPPER EYELID: ICD-10-CM

## 2025-02-24 PROCEDURE — 99207 PR PRENATAL VISIT: CPT | Performed by: FAMILY MEDICINE

## 2025-02-24 PROCEDURE — 99214 OFFICE O/P EST MOD 30 MIN: CPT | Mod: 25 | Performed by: FAMILY MEDICINE

## 2025-02-24 ASSESSMENT — PATIENT HEALTH QUESTIONNAIRE - PHQ9: SUM OF ALL RESPONSES TO PHQ QUESTIONS 1-9: 1

## 2025-02-24 NOTE — PROGRESS NOTES
"SUBJECTIVE:  Kevin Urena  at 29w2d. Estimated Date of Delivery: May 10, 2025.  She is doing well. She denies abdominal pain/cramping, vaginal bleeding, leakage of fluid. Fetal movement is normal. She denies headache, vision changes, lower extremity edema.  Concerns: had a pimple that turned into a \"fat pimple\"  Missed eye appointment - needs to reschedule  Asthma is good now, no problems. Cough resolved  No alcohol use    ROS:  Negative except as noted above in HPI.    OBJECTIVE:  Vitals: /74   Pulse 113   Temp 97.8  F (36.6  C) (Oral)   Resp 20   Ht 1.549 m (5' 1\")   Wt 58.6 kg (129 lb 3.2 oz)   LMP 2024 (Exact Date)   SpO2 97%   BMI 24.41 kg/m     Total weight gain:  10.6 kg (23 lb 7.2 oz)   Wt Readings from Last 3 Encounters:   25 58.6 kg (129 lb 3.2 oz)   25 55.4 kg (122 lb 1 oz)   25 54.9 kg (121 lb 1.6 oz)   Exam: Per flowsheet  Skin: nontender nodule right upper eylid without overlying skin changes, flat skin colored papule just medial to left nasolabial fold    ASSESSMENT/PLAN:  Kevin was seen today for prenatal care.    Diagnoses and all orders for this visit:    Supervision of high risk pregnancy in third trimester    Mild intermittent asthma without complication    Major depressive disorder, recurrent episode, in partial remission    Alcohol use disorder, moderate, in early remission (H)    Chalazion of right upper eyelid    Skin lesion      24 year old  at 29w2d   1h GTT completed and results reviewed.  Declines Tdap - did not get with last pregnancy either.  Asthma - cough resolved after last visit. No signs/symptoms of exacerbatio. Continue symicort.  Depression - in remission, continue lexapro. Continues to abstain from alcohol.  Chalazion - missed eye appt, staff helped her to reschedule today.  Skin lesion - reported this was a pimple then changed. Advised to continue to monitor and follow up at next appointment.  Pain management during " labor: plans unmedicated water birth - scheduled to see Dr. Lee for next OB appt who does water births.   Post-partum Contraception: need to discuss  Breast/Bottle: breast   RTC as scheduled or sooner with problems.

## 2025-03-16 ENCOUNTER — NURSE TRIAGE (OUTPATIENT)
Dept: FAMILY MEDICINE | Facility: CLINIC | Age: 25
End: 2025-03-16
Payer: COMMERCIAL

## 2025-03-17 NOTE — TELEPHONE ENCOUNTER
Rafia Hess MD [facility]8 minutes ago (2:08 PM)     AT  Can she come in to be seen tomorrow with me? Ok to double book before 2pm - prefer 10am or 10:30am if possible. If pain comes back and is severe, not going away, she should go to ER before then.     Called patient to assist with a DB for 3/18 @ 0840 am.  Advised patient the DB process (see in earlier or later depending on patient ahead of her her).     Frankie Almaraz RN  [facility]

## 2025-03-17 NOTE — TELEPHONE ENCOUNTER
Nurse Triage SBAR    Is this a 2nd Level Triage? YES, LICENSED PRACTITIONER REVIEW IS REQUIRED    Situation: Lower abdomina, left thigh and inner left thigh close to vaginal area pain for the past week ago.    Background: 32W2D pregnancy with due date 5/10/25.   Reported the symptoms are new for this pregnancy.  This is patient's 2nd pregnancy.    Assessment:    Pain comes and go.    - Pain affected left sided of thigh, inner thing/vaginal, lower abdominal and lower back pain.    - pain comes and goes. Rated pain at 8.5/10 at peak of pain.  Denied Tylenol used.    - reported one episode of reddish/yellowish urine discoloration on Friday or Saturday. No further re-occurrence since.  No issues with bowel/bladder elimination.   - reported some some heavy lifting involved in the past week of 35 bottles of water in one pack and her 18 month old son.   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    Recommendation: Encounter to be routed to provider for recommendation.  Patient has upcoming appt with provider scheduled for 3/24.     Routed to provider - Dr. Zuleta.     Does the patient meet one of the following criteria for ADS visit consideration? No    Ok to leave a detail message if no answer.    Additional Information   Negative: Passed out (e.g., fainted, lost consciousness, blacked out and was not responding)   Negative: Shock suspected (e.g., cold/pale/clammy skin, too weak to stand, low BP, rapid pulse)   Negative: Difficult to awaken or acting confused (e.g., disoriented, slurred speech)   Negative: SEVERE abdominal pain (e.g., excruciating), constant, and present > 1 hour   Negative: SEVERE vaginal bleeding (e.g., continuous red blood from vagina, large blood clots)   Negative: Sounds like a life-threatening emergency to the triager   Negative: Having contractions or other symptoms of labor  "(such as vaginal pressure) and pregnant 37 or more weeks (i.e., term pregnancy)   Negative: Having contractions or other symptoms of labor (such as vaginal pressure) and < 37 weeks pregnant (i.e., )   Negative: Abdominal pain and pregnant < 20 weeks   Negative: Vomiting red blood or black (coffee ground) material   Negative: SEVERE headache that is not relieved with acetaminophen (e.g., Tylenol)   Negative: MODERATE-SEVERE abdominal pain (e.g., interferes with normal activities, awakens from sleep)   Negative: Vaginal bleeding or spotting   Negative: Baby moving less today (e.g., kick count < 5 in 1 hour or < 10 in 2 hours) and 23 or more weeks pregnant   Negative: Leakage of fluid from vagina   Negative: New hand or face swelling   Negative: New blurred vision or vision changes   Negative: Upper abdominal pain and Systolic BP >= 140 OR Diastolic BP >= 90    Answer Assessment - Initial Assessment Questions  1. LOCATION: \"Where does it hurt?\"       Lower abdominal pain, left thigh and left inner thigh(close to vaginal area) and lower back pain.  Observed the leg/ankle and foot are a bit swollen more than the right.      2. RADIATION: \"Does the pain shoot anywhere else?\" (e.g., chest, back)      Denied.  Patient reported pain were worsening when laying down on left sided of the body.    No issues when lay on right sided of the body.     3. ONSET: \"When did the pain begin?\" (Minutes, hours or days ago)       A week or two.  Missed one of the OB follow up due to pain.    4. SUDDEN: \"Gradual or sudden onset?\"      Suddenly.  Reported out of breath more often even walking lengthy distance.    5. PATTERN: \"Does the pain come and go, or has it been constant since it started?\"   - comes and goes in areas described.  - walking, driving, first wake up in the morning hurts area described.   - laying on right sided and sitting down has helped to minimize the pain at times.      6. SEVERITY: \"How bad is the pain?\" \"What " "does it keep you from doing?\"  (e.g., Scale 1-10; mild, moderate, or severe)      8.5/10 at peak of pain. Pain off and on.  Does not have any pain at time of triage encounter.  Pain lasting - minutes to few hours.    Work:  Door dashing, work does involve lots of standing.     7. RECURRENT SYMPTOM: \"Have you ever had this type of stomach pain before?\" If Yes, ask: \"When was the last time?\" and \"What happened that time?\"       Denied.  This is completely new symptoms.  Patient reported have not experience similar symptoms with previous pregnancy.    8. CAUSE: \"What do you think is causing the stomach pain?      Denied.  Unknown cause or injury (?)  Patient reported to have lift a 35 pack water once last Thursday and sometimes lift her son who is now 18 months old.      9. RELIEVING/AGGRAVATING FACTORS: \"What makes it better or worse?\" (e.g., antacids, bowel movement, movement)      Resting and laying on opposite side and propped a pillow in between legs and lower back.     10. FETAL MOVEMENT: \"Has the baby's movement decreased or changed significantly from normal?\"     Patient reported positive fetal activity.     11. OTHER SYMPTOMS: \"Do you have any other symptoms?\" (e.g., back pain, contractions, diarrhea, fever, headache, urination pain, vaginal bleeding, vaginal discharge, vomiting)  Patient reported noted a one episode of reddish/yellowish drop of urine into toilet on Friday or Saturday.  No further episode since thereafter.No issues with bowel/bladder elimination    12. SOURAV: \"What date are you expecting to deliver?\"        5/10/2025    Protocols used: Pregnancy - Abdominal Pain Greater Than 20 Weeks EGA-A-OH    "

## 2025-03-18 ENCOUNTER — OFFICE VISIT (OUTPATIENT)
Dept: FAMILY MEDICINE | Facility: CLINIC | Age: 25
End: 2025-03-18
Payer: COMMERCIAL

## 2025-03-18 VITALS
BODY MASS INDEX: 25.49 KG/M2 | HEIGHT: 61 IN | SYSTOLIC BLOOD PRESSURE: 108 MMHG | RESPIRATION RATE: 16 BRPM | OXYGEN SATURATION: 97 % | DIASTOLIC BLOOD PRESSURE: 69 MMHG | WEIGHT: 135.04 LBS | HEART RATE: 83 BPM | TEMPERATURE: 98.2 F

## 2025-03-18 DIAGNOSIS — R10.2 PAIN OF ROUND LIGAMENT AFFECTING PREGNANCY, ANTEPARTUM: Primary | ICD-10-CM

## 2025-03-18 DIAGNOSIS — M53.3 SACROILIAC JOINT PAIN: ICD-10-CM

## 2025-03-18 DIAGNOSIS — J45.20 MILD INTERMITTENT ASTHMA WITHOUT COMPLICATION: ICD-10-CM

## 2025-03-18 DIAGNOSIS — O26.899 PAIN OF ROUND LIGAMENT AFFECTING PREGNANCY, ANTEPARTUM: Primary | ICD-10-CM

## 2025-03-18 PROCEDURE — 99213 OFFICE O/P EST LOW 20 MIN: CPT | Performed by: FAMILY MEDICINE

## 2025-03-18 PROCEDURE — 3074F SYST BP LT 130 MM HG: CPT | Performed by: FAMILY MEDICINE

## 2025-03-18 PROCEDURE — 3078F DIAST BP <80 MM HG: CPT | Performed by: FAMILY MEDICINE

## 2025-03-18 NOTE — PROGRESS NOTES
"  Assessment & Plan     Pain of round ligament affecting pregnancy, antepartum  Sacroiliac joint pain  LLQ and left lower back pain due to round ligament pain and scaroiliac joint pain. Low concern for pyelonephritis, nephrolithiasis, ovarian cyst, SBO, other /intraabdominal/gyn process. Use tylenol prn, start PT, use belly band. Reviewed reasons to call or seek urgent or emergent care. Patient expressed understanding.   - Physical Therapy  Referral; Future    Mild intermittent asthma without complication  Asthma symptoms improved - she will continue to use symbicort but use only prn for now.      BMI  Estimated body mass index is 25.52 kg/m  as calculated from the following:    Height as of this encounter: 1.549 m (5' 1\").    Weight as of this encounter: 61.3 kg (135 lb 0.6 oz).   Not applicable, patient is pregnant      Follow up next week for routine OB. Missed appointment with Dr. Lee - rescheduled.    Crystal Brown is a 24 year old, presenting for the following health issues:  Abdominal Pain (2x week of pain) and Derm Problem (Mild rash on arm)      3/18/2025     8:40 AM   Additional Questions   Roomed by paw p   Accompanied by self     History of Present Illness       Reason for visit:  Pain She is missing 1 dose(s) of medications per week.  She is not taking prescribed medications regularly due to remembering to take.      Left sided pain - points to left lower quadrant/groin  Started doing door dash one week ago  Sunday had really bad pain, took [place] to petting zoo  Not sure if it is just round ligament pain - also feels like there is pain in her left leg  For a while thought maybe left leg was swollen  On Sunday pain radiated into leg, thinks it was from lots of walking  Right side doesn't get pain  Pain started about two weeks, comes and goes throughout the day  Gets some lower back pain too  No pain when pees, blood in urine - one time urine looked dark orange  No VB, LOF, " "decreased FM, contractions  When walks pain goes into left groin    Asthma under good control              Review of Systems  Constitutional, HEENT, cardiovascular, pulmonary, gi and gu systems are negative, except as otherwise noted.      Objective    /69   Pulse 83   Temp 98.2  F (36.8  C) (Oral)   Resp 16   Ht 1.549 m (5' 1\")   Wt 61.3 kg (135 lb 0.6 oz)   LMP 07/28/2024 (Exact Date)   SpO2 97%   BMI 25.52 kg/m    Body mass index is 25.52 kg/m .  Physical Exam   GENERAL: alert and no distress  EYES: Eyes grossly normal to inspection, PERRL and conjunctivae and sclerae normal  ABDOMEN: soft, mild tenderness left lower quadrant, bowel sounds normal, and gravid as expected  MS: no gross musculoskeletal defects noted, no edema, tenderness over left SI joint  NEURO: Normal strength and tone, mentation intact and speech normal  PSYCH: mentation appears normal, affect normal/bright    Fundal height 32cm                Signed Electronically by: Rafia Hess MD    "

## 2025-04-02 ENCOUNTER — PATIENT OUTREACH (OUTPATIENT)
Dept: CARE COORDINATION | Facility: CLINIC | Age: 25
End: 2025-04-02
Payer: COMMERCIAL

## 2025-04-02 NOTE — PROGRESS NOTES
Clinic Care Coordination Contact  [facility]/Voicemail    Clinical Data: Care Coordinator Outreach    Outreach Documentation Number of Outreach Attempt   4/2/2025  12:58 PM 2   4/2/2025  12:59 PM 1       Left message on patient's voicemail with call back information and requested return call.      Plan: Care Coordinator will try to reach patient again in 1 to 2 weeks business days.    CHW unable to reach patient and per chart review, CCC SW was not able to reach patient for phone assessment and CHW scheduled patient with [facility] SW for phone assessment on 4/09/2025.

## 2025-04-16 ENCOUNTER — PRENATAL OFFICE VISIT (OUTPATIENT)
Dept: FAMILY MEDICINE | Facility: CLINIC | Age: 25
End: 2025-04-16
Payer: COMMERCIAL

## 2025-04-16 VITALS
OXYGEN SATURATION: 99 % | SYSTOLIC BLOOD PRESSURE: 124 MMHG | RESPIRATION RATE: 18 BRPM | TEMPERATURE: 97.3 F | HEART RATE: 98 BPM | WEIGHT: 139 LBS | BODY MASS INDEX: 26.24 KG/M2 | HEIGHT: 61 IN | DIASTOLIC BLOOD PRESSURE: 76 MMHG

## 2025-04-16 DIAGNOSIS — Z34.83 ENCOUNTER FOR SUPERVISION OF OTHER NORMAL PREGNANCY IN THIRD TRIMESTER: Primary | ICD-10-CM

## 2025-04-16 PROBLEM — Z34.90 SUPERVISION OF NORMAL PREGNANCY: Status: ACTIVE | Noted: 2025-04-16

## 2025-04-16 LAB
ALBUMIN UR-MCNC: NEGATIVE MG/DL
GLUCOSE UR STRIP-MCNC: NEGATIVE MG/DL
KETONES UR STRIP-MCNC: NEGATIVE MG/DL

## 2025-04-16 PROCEDURE — 81003 URINALYSIS AUTO W/O SCOPE: CPT | Performed by: FAMILY MEDICINE

## 2025-04-16 PROCEDURE — 87653 STREP B DNA AMP PROBE: CPT | Performed by: FAMILY MEDICINE

## 2025-04-16 PROCEDURE — 0502F SUBSEQUENT PRENATAL CARE: CPT | Performed by: FAMILY MEDICINE

## 2025-04-16 PROCEDURE — 3078F DIAST BP <80 MM HG: CPT | Performed by: FAMILY MEDICINE

## 2025-04-16 PROCEDURE — 3074F SYST BP LT 130 MM HG: CPT | Performed by: FAMILY MEDICINE

## 2025-04-16 PROCEDURE — 99207 PR PRENATAL VISIT: CPT | Performed by: FAMILY MEDICINE

## 2025-04-16 NOTE — PROGRESS NOTES
Has been working with Dr Zuleta for prentatal care with last visit 3/18/25.  Reviewed her labs and prenatal care thus far with this pregnancy.   Pt is considering water birth with this pregnancy. Hcv neg, hiv neg, hep b neg     Significant history of mental health issues- worse with her last pregnancy needing day treatment program treated with sertraline.      Has had 1  at term 2023 38 weeks at home with a home midwife without complications.  Water broke around 1am and had her baby around 10am.  Did waterbirth at home.  No significant bleeding.  Can't do this now due to current living situation.      Did not get tdap with this pregnancy, did not get flu/covid with this pregnancy.  Declined vaccines.      H/o intermittent asthma.  Well controlled- has controller med at home.      Note mental health is doing well right now on lexapro 5mg daily.     Breastfeeding went well with 1st baby.      Declines antibiotic eye ointment, vitamin K and baby hep B vaccine- understands risks and benefits of use and nonuse of these recommendations.      Wants to take home her placenta.       Concerns: as above. Noting some contractions yesterday.  No bleeding or LoF.    No vaginal bleeding, LOF, contractions.  No HA, RUQ pain, N/V, visual changes.  Discussed signs of labor and when to call or come in.  Discussed kick counts and fetal movement.  Reportable signs and symptoms discussed.  GBS done today.  Labor precautions discussed.  RTC 1 week.  Prenatal flowsheet information is reviewed.    Lorie Lee MD

## 2025-04-16 NOTE — PATIENT INSTRUCTIONS
"Week 37 of Your Pregnancy: Care Instructions    Most babies are born between 37 and 40 weeks.   This is a good time to pack a bag to take with you to the birth. Then it will be ready to go when you are.     Learn about breastfeeding.  For example, find out about ways to hold your baby to make breastfeeding easier. And think about learning how to pump and store milk.     Know that crying is normal.  It's common for babies to cry 1 to 3 hours a day. Some cry more, and some cry less.     Learn why babies cry.  They may be hungry; have gas; need a diaper change; or feel cold, warm, tired, lonely, or tense. Sometimes they cry for unknown reasons.     Think about what will help you stay calm when your baby cries.  Taking slow, deep breaths can help. So can taking a break. It's okay to put your baby somewhere safe (like their crib) and walk away for a few minutes.     Learn about safe sleep for your baby.  Always put your baby to sleep on their back. Place them alone in a crib or bassinet with a firm, flat surface. Keep soft items like stuffed animals out of the crib.     Learn what to expect with  poop.  Your baby will have their own bowel patterns. Some babies have several bowel movements a day. Some have fewer.     Know that  babies will often have loose, yellow bowel movements.  Formula-fed babies have more formed stools. If your baby's poop looks like pellets, your baby is constipated.   Follow-up care is a key part of your treatment and safety. Be sure to make and go to all appointments, and call your doctor if you are having problems. It's also a good idea to know your test results and keep a list of the medicines you take.  Where can you learn more?  Go to https://[email or web address].net/patiented  Enter N257 in the search box to learn more about \"Week 37 of Your Pregnancy: Care Instructions.\"  Current as of: 2024  Content Version: 14.4    [id number] [employer] [employer]   Care instructions " adapted under license by your healthcare professional. If you have questions about a medical condition or this instruction, always ask your healthcare professional. [employer] disclaims any warranty or liability for your use of this information.

## 2025-04-17 LAB — GP B STREP DNA SPEC QL NAA+PROBE: NEGATIVE

## 2025-04-23 ENCOUNTER — PRENATAL OFFICE VISIT (OUTPATIENT)
Dept: FAMILY MEDICINE | Facility: CLINIC | Age: 25
End: 2025-04-23
Payer: COMMERCIAL

## 2025-04-23 VITALS
RESPIRATION RATE: 21 BRPM | OXYGEN SATURATION: 100 % | SYSTOLIC BLOOD PRESSURE: 112 MMHG | TEMPERATURE: 97.5 F | HEART RATE: 105 BPM | DIASTOLIC BLOOD PRESSURE: 66 MMHG | WEIGHT: 145.25 LBS | BODY MASS INDEX: 27.44 KG/M2

## 2025-04-23 DIAGNOSIS — Z34.83 ENCOUNTER FOR SUPERVISION OF OTHER NORMAL PREGNANCY IN THIRD TRIMESTER: Primary | ICD-10-CM

## 2025-04-23 LAB
ALBUMIN UR-MCNC: 30 MG/DL
GLUCOSE UR STRIP-MCNC: NEGATIVE MG/DL
KETONES UR STRIP-MCNC: ABNORMAL MG/DL

## 2025-04-23 PROCEDURE — 81003 URINALYSIS AUTO W/O SCOPE: CPT | Performed by: FAMILY MEDICINE

## 2025-04-23 PROCEDURE — 99207 PR PRENATAL VISIT: CPT | Performed by: FAMILY MEDICINE

## 2025-04-23 PROCEDURE — 0502F SUBSEQUENT PRENATAL CARE: CPT | Performed by: FAMILY MEDICINE

## 2025-04-23 PROCEDURE — 3074F SYST BP LT 130 MM HG: CPT | Performed by: FAMILY MEDICINE

## 2025-04-23 PROCEDURE — 3078F DIAST BP <80 MM HG: CPT | Performed by: FAMILY MEDICINE

## 2025-04-23 NOTE — PATIENT INSTRUCTIONS
"Week 37 of Your Pregnancy: Care Instructions    Most babies are born between 37 and 40 weeks.   This is a good time to pack a bag to take with you to the birth. Then it will be ready to go when you are.     Learn about breastfeeding.  For example, find out about ways to hold your baby to make breastfeeding easier. And think about learning how to pump and store milk.     Know that crying is normal.  It's common for babies to cry 1 to 3 hours a day. Some cry more, and some cry less.     Learn why babies cry.  They may be hungry; have gas; need a diaper change; or feel cold, warm, tired, lonely, or tense. Sometimes they cry for unknown reasons.     Think about what will help you stay calm when your baby cries.  Taking slow, deep breaths can help. So can taking a break. It's okay to put your baby somewhere safe (like their crib) and walk away for a few minutes.     Learn about safe sleep for your baby.  Always put your baby to sleep on their back. Place them alone in a crib or bassinet with a firm, flat surface. Keep soft items like stuffed animals out of the crib.     Learn what to expect with  poop.  Your baby will have their own bowel patterns. Some babies have several bowel movements a day. Some have fewer.     Know that  babies will often have loose, yellow bowel movements.  Formula-fed babies have more formed stools. If your baby's poop looks like pellets, your baby is constipated.   Follow-up care is a key part of your treatment and safety. Be sure to make and go to all appointments, and call your doctor if you are having problems. It's also a good idea to know your test results and keep a list of the medicines you take.  Where can you learn more?  Go to https://www.Pango.net/patiented  Enter N257 in the search box to learn more about \"Week 37 of Your Pregnancy: Care Instructions.\"  Current as of: 2024  Content Version: 14.4    4587-4788 [facility] [place].   Care instructions " adapted under license by your healthcare professional. If you have questions about a medical condition or this instruction, always ask your healthcare professional. [employer] disclaims any warranty or liability for your use of this information.

## 2025-04-23 NOTE — PROGRESS NOTES
Increased pelvic pressure and pain.   Hard to walk  Increased contractions yesterday- better today     Concerns: as above   No vaginal bleeding, LOF, contractions.  No HA, RUQ pain, N/V, visual changes.  Discussed signs of labor and when to call or come in.  Discussed kick counts and fetal movement.  Reportable signs and symptoms discussed.  Labor precautions discussed.  RTC 1 week.    Will get BPP with EFW to check size due to measuring small     Lorie Lee MD

## 2025-04-24 ENCOUNTER — HOSPITAL ENCOUNTER (OUTPATIENT)
Facility: HOSPITAL | Age: 25
Discharge: HOME | End: 2025-04-24
Attending: FAMILY MEDICINE | Admitting: FAMILY MEDICINE
Payer: COMMERCIAL

## 2025-04-24 ENCOUNTER — HOSPITAL ENCOUNTER (OUTPATIENT)
Dept: ULTRASOUND IMAGING | Facility: HOSPITAL | Age: 25
Discharge: HOME | End: 2025-04-24
Attending: FAMILY MEDICINE
Payer: COMMERCIAL

## 2025-04-24 VITALS
HEIGHT: 62 IN | SYSTOLIC BLOOD PRESSURE: 132 MMHG | TEMPERATURE: 97.9 F | RESPIRATION RATE: 18 BRPM | DIASTOLIC BLOOD PRESSURE: 62 MMHG | BODY MASS INDEX: 27 KG/M2

## 2025-04-24 DIAGNOSIS — Z34.83 ENCOUNTER FOR SUPERVISION OF OTHER NORMAL PREGNANCY IN THIRD TRIMESTER: ICD-10-CM

## 2025-04-24 PROBLEM — Z36.89 ENCOUNTER FOR TRIAGE IN PREGNANT PATIENT: Status: ACTIVE | Noted: 2025-04-24

## 2025-04-24 PROCEDURE — 59025 FETAL NON-STRESS TEST: CPT

## 2025-04-24 PROCEDURE — 76819 FETAL BIOPHYS PROFIL W/O NST: CPT

## 2025-04-24 PROCEDURE — G0463 HOSPITAL OUTPT CLINIC VISIT: HCPCS

## 2025-04-24 RX ORDER — LIDOCAINE 40 MG/G
CREAM TOPICAL
Status: DISCONTINUED | OUTPATIENT
Start: 2025-04-24 | End: 2025-04-24 | Stop reason: HOSPADM

## 2025-04-24 ASSESSMENT — ACTIVITIES OF DAILY LIVING (ADL): ADLS_ACUITY_SCORE: 18

## 2025-04-24 NOTE — PROGRESS NOTES
Data: Patient assessed in the Birthplace for  NST . Cervix 2.5 cm dilated and 70% effaced. Fetal station -2. Membranes intact. Contractions are not present. See flowsheets for fetal assessment documentation.     Action: Presumed adequate fetal oxygenation documented. Discharge instructions reviewed. Patient instructed to report change in fetal movement, vaginal leaking of fluid or bleeding, abdominal pain, or any concerns related to the pregnancy to provider/clinic.      Response: Orders to discharge home per Dr Lee. Patient verbalized understanding of education and agreement with plan. Discharged to home at 1430.

## 2025-04-24 NOTE — PROGRESS NOTES
Data: Patient presented to Birthplace: 2025  1:26 PM.  Reason for maternal/fetal assessment is  NST following BPP . Patient reports active fetal movement. Patient denies uterine contractions, leaking of vaginal fluid/rupture of membranes, vaginal bleeding, abdominal pain, nausea, vomiting, headache, visual disturbances. Patient reports fetal movement is normal. Patient is a 37w5d .  Prenatal record reviewed. Pregnancy has been uncomplicated.    Vital signs wnl. Support person is present.     Action: Verbal consent for EFM. Triage assessment completed.     Response: Patient verbalized agreement with plan. Will contact Dr Lee with update and further orders.

## 2025-04-28 ENCOUNTER — PRENATAL OFFICE VISIT (OUTPATIENT)
Dept: FAMILY MEDICINE | Facility: CLINIC | Age: 25
End: 2025-04-28
Payer: COMMERCIAL

## 2025-04-28 ENCOUNTER — TELEPHONE (OUTPATIENT)
Dept: FAMILY MEDICINE | Facility: CLINIC | Age: 25
End: 2025-04-28

## 2025-04-28 ENCOUNTER — HOSPITAL ENCOUNTER (INPATIENT)
Facility: HOSPITAL | Age: 25
LOS: 2 days | Discharge: HOME | End: 2025-04-30
Attending: FAMILY MEDICINE | Admitting: FAMILY MEDICINE
Payer: COMMERCIAL

## 2025-04-28 VITALS
OXYGEN SATURATION: 98 % | DIASTOLIC BLOOD PRESSURE: 73 MMHG | HEIGHT: 61 IN | HEART RATE: 107 BPM | BODY MASS INDEX: 27.19 KG/M2 | TEMPERATURE: 97 F | WEIGHT: 144 LBS | RESPIRATION RATE: 18 BRPM | SYSTOLIC BLOOD PRESSURE: 108 MMHG

## 2025-04-28 DIAGNOSIS — D62 ANEMIA DUE TO BLOOD LOSS, ACUTE: ICD-10-CM

## 2025-04-28 DIAGNOSIS — F41.1 GENERALIZED ANXIETY DISORDER: ICD-10-CM

## 2025-04-28 DIAGNOSIS — Z34.83 ENCOUNTER FOR SUPERVISION OF OTHER NORMAL PREGNANCY IN THIRD TRIMESTER: Primary | ICD-10-CM

## 2025-04-28 DIAGNOSIS — J45.20 MILD INTERMITTENT ASTHMA WITHOUT COMPLICATION: ICD-10-CM

## 2025-04-28 DIAGNOSIS — F39 EPISODIC MOOD DISORDER: ICD-10-CM

## 2025-04-28 DIAGNOSIS — F91.9 DISRUPTIVE BEHAVIOR DISORDER: ICD-10-CM

## 2025-04-28 DIAGNOSIS — F33.41 MAJOR DEPRESSIVE DISORDER, RECURRENT EPISODE, IN PARTIAL REMISSION: ICD-10-CM

## 2025-04-28 DIAGNOSIS — F10.21 ALCOHOL USE DISORDER, MODERATE, IN EARLY REMISSION (H): ICD-10-CM

## 2025-04-28 PROBLEM — Z36.89 ENCOUNTER FOR TRIAGE IN PREGNANT PATIENT: Status: RESOLVED | Noted: 2025-04-24 | Resolved: 2025-04-28

## 2025-04-28 LAB
ABO + RH BLD: NORMAL
ALBUMIN UR-MCNC: 30 MG/DL
BLD GP AB SCN SERPL QL: NEGATIVE
ERYTHROCYTE [DISTWIDTH] IN BLOOD BY AUTOMATED COUNT: 13.5 % (ref 10–15)
GLUCOSE UR STRIP-MCNC: NEGATIVE MG/DL
HCT VFR BLD AUTO: 39 % (ref 35–47)
HGB BLD-MCNC: 13 G/DL (ref 11.7–15.7)
HOLD SPECIMEN: NORMAL
HOLD SPECIMEN: NORMAL
KETONES UR STRIP-MCNC: ABNORMAL MG/DL
MCH RBC QN AUTO: 30.9 PG (ref 26.5–33)
MCHC RBC AUTO-ENTMCNC: 33.3 G/DL (ref 31.5–36.5)
MCV RBC AUTO: 93 FL (ref 78–100)
PLATELET # BLD AUTO: 175 10E3/UL (ref 150–450)
RBC # BLD AUTO: 4.21 10E6/UL (ref 3.8–5.2)
SPECIMEN EXP DATE BLD: NORMAL
WBC # BLD AUTO: 9.9 10E3/UL (ref 4–11)

## 2025-04-28 PROCEDURE — 36415 COLL VENOUS BLD VENIPUNCTURE: CPT | Performed by: FAMILY MEDICINE

## 2025-04-28 PROCEDURE — 81003 URINALYSIS AUTO W/O SCOPE: CPT | Performed by: FAMILY MEDICINE

## 2025-04-28 PROCEDURE — 86780 TREPONEMA PALLIDUM: CPT | Performed by: FAMILY MEDICINE

## 2025-04-28 PROCEDURE — 722N000001 HC LABOR CARE VAGINAL DELIVERY SINGLE

## 2025-04-28 PROCEDURE — 10907ZC DRAINAGE OF AMNIOTIC FLUID, THERAPEUTIC FROM PRODUCTS OF CONCEPTION, VIA NATURAL OR ARTIFICIAL OPENING: ICD-10-PCS | Performed by: FAMILY MEDICINE

## 2025-04-28 PROCEDURE — 120N000001 HC R&B MED SURG/OB

## 2025-04-28 PROCEDURE — 99207 PR PRENATAL VISIT: CPT | Performed by: FAMILY MEDICINE

## 2025-04-28 PROCEDURE — 3074F SYST BP LT 130 MM HG: CPT | Performed by: FAMILY MEDICINE

## 2025-04-28 PROCEDURE — 86901 BLOOD TYPING SEROLOGIC RH(D): CPT | Performed by: FAMILY MEDICINE

## 2025-04-28 PROCEDURE — 250N000011 HC RX IP 250 OP 636: Performed by: FAMILY MEDICINE

## 2025-04-28 PROCEDURE — 59400 OBSTETRICAL CARE: CPT | Performed by: FAMILY MEDICINE

## 2025-04-28 PROCEDURE — 0502F SUBSEQUENT PRENATAL CARE: CPT | Performed by: FAMILY MEDICINE

## 2025-04-28 PROCEDURE — 85018 HEMOGLOBIN: CPT | Performed by: FAMILY MEDICINE

## 2025-04-28 PROCEDURE — 3078F DIAST BP <80 MM HG: CPT | Performed by: FAMILY MEDICINE

## 2025-04-28 PROCEDURE — 250N000013 HC RX MED GY IP 250 OP 250 PS 637: Performed by: FAMILY MEDICINE

## 2025-04-28 RX ORDER — LOPERAMIDE HYDROCHLORIDE 2 MG/1
4 CAPSULE ORAL
Status: DISCONTINUED | OUTPATIENT
Start: 2025-04-28 | End: 2025-04-30 | Stop reason: HOSPADM

## 2025-04-28 RX ORDER — CARBOPROST TROMETHAMINE 250 UG/ML
250 INJECTION, SOLUTION INTRAMUSCULAR
Status: DISCONTINUED | OUTPATIENT
Start: 2025-04-28 | End: 2025-04-28 | Stop reason: HOSPADM

## 2025-04-28 RX ORDER — LOPERAMIDE HYDROCHLORIDE 2 MG/1
2 CAPSULE ORAL
Status: DISCONTINUED | OUTPATIENT
Start: 2025-04-28 | End: 2025-04-30 | Stop reason: HOSPADM

## 2025-04-28 RX ORDER — LOPERAMIDE HYDROCHLORIDE 2 MG/1
2 CAPSULE ORAL
Status: DISCONTINUED | OUTPATIENT
Start: 2025-04-28 | End: 2025-04-28 | Stop reason: HOSPADM

## 2025-04-28 RX ORDER — NALOXONE HYDROCHLORIDE 0.4 MG/ML
0.2 INJECTION, SOLUTION INTRAMUSCULAR; INTRAVENOUS; SUBCUTANEOUS
Status: DISCONTINUED | OUTPATIENT
Start: 2025-04-28 | End: 2025-04-30 | Stop reason: HOSPADM

## 2025-04-28 RX ORDER — MISOPROSTOL 200 UG/1
800 TABLET ORAL
Status: DISCONTINUED | OUTPATIENT
Start: 2025-04-28 | End: 2025-04-28 | Stop reason: HOSPADM

## 2025-04-28 RX ORDER — ESCITALOPRAM OXALATE 5 MG/1
5 TABLET ORAL DAILY
Status: DISCONTINUED | OUTPATIENT
Start: 2025-04-28 | End: 2025-04-30 | Stop reason: HOSPADM

## 2025-04-28 RX ORDER — OXYTOCIN 10 [USP'U]/ML
10 INJECTION, SOLUTION INTRAMUSCULAR; INTRAVENOUS
Status: DISCONTINUED | OUTPATIENT
Start: 2025-04-28 | End: 2025-04-30 | Stop reason: HOSPADM

## 2025-04-28 RX ORDER — POLYETHYLENE GLYCOL 3350 17 G/17G
17 POWDER, FOR SOLUTION ORAL DAILY PRN
Status: DISCONTINUED | OUTPATIENT
Start: 2025-04-28 | End: 2025-04-30 | Stop reason: HOSPADM

## 2025-04-28 RX ORDER — TRANEXAMIC ACID 10 MG/ML
1 INJECTION, SOLUTION INTRAVENOUS EVERY 30 MIN PRN
Status: DISCONTINUED | OUTPATIENT
Start: 2025-04-28 | End: 2025-04-30 | Stop reason: HOSPADM

## 2025-04-28 RX ORDER — MISOPROSTOL 200 UG/1
400 TABLET ORAL
Status: DISCONTINUED | OUTPATIENT
Start: 2025-04-28 | End: 2025-04-30 | Stop reason: HOSPADM

## 2025-04-28 RX ORDER — ONDANSETRON 4 MG/1
4 TABLET, ORALLY DISINTEGRATING ORAL EVERY 6 HOURS PRN
Status: DISCONTINUED | OUTPATIENT
Start: 2025-04-28 | End: 2025-04-28 | Stop reason: HOSPADM

## 2025-04-28 RX ORDER — LOPERAMIDE HYDROCHLORIDE 2 MG/1
4 CAPSULE ORAL
Status: DISCONTINUED | OUTPATIENT
Start: 2025-04-28 | End: 2025-04-28 | Stop reason: HOSPADM

## 2025-04-28 RX ORDER — OXYTOCIN/0.9 % SODIUM CHLORIDE 30/500 ML
100-340 PLASTIC BAG, INJECTION (ML) INTRAVENOUS CONTINUOUS PRN
Status: DISCONTINUED | OUTPATIENT
Start: 2025-04-28 | End: 2025-04-30 | Stop reason: HOSPADM

## 2025-04-28 RX ORDER — OXYTOCIN 10 [USP'U]/ML
10 INJECTION, SOLUTION INTRAMUSCULAR; INTRAVENOUS
Status: COMPLETED | OUTPATIENT
Start: 2025-04-28 | End: 2025-04-28

## 2025-04-28 RX ORDER — ACETAMINOPHEN 325 MG/1
650 TABLET ORAL EVERY 4 HOURS PRN
Status: DISCONTINUED | OUTPATIENT
Start: 2025-04-28 | End: 2025-04-28 | Stop reason: HOSPADM

## 2025-04-28 RX ORDER — TERBUTALINE SULFATE 1 MG/ML
0.25 INJECTION SUBCUTANEOUS
Status: DISCONTINUED | OUTPATIENT
Start: 2025-04-28 | End: 2025-04-28 | Stop reason: HOSPADM

## 2025-04-28 RX ORDER — HYDROCORTISONE 25 MG/G
CREAM TOPICAL 3 TIMES DAILY PRN
Status: DISCONTINUED | OUTPATIENT
Start: 2025-04-28 | End: 2025-04-30 | Stop reason: HOSPADM

## 2025-04-28 RX ORDER — ACETAMINOPHEN 325 MG/1
650 TABLET ORAL EVERY 4 HOURS PRN
Status: DISCONTINUED | OUTPATIENT
Start: 2025-04-28 | End: 2025-04-30 | Stop reason: HOSPADM

## 2025-04-28 RX ORDER — NALOXONE HYDROCHLORIDE 0.4 MG/ML
0.4 INJECTION, SOLUTION INTRAMUSCULAR; INTRAVENOUS; SUBCUTANEOUS
Status: DISCONTINUED | OUTPATIENT
Start: 2025-04-28 | End: 2025-04-30 | Stop reason: HOSPADM

## 2025-04-28 RX ORDER — METOCLOPRAMIDE HYDROCHLORIDE 5 MG/ML
10 INJECTION INTRAMUSCULAR; INTRAVENOUS EVERY 6 HOURS PRN
Status: DISCONTINUED | OUTPATIENT
Start: 2025-04-28 | End: 2025-04-28 | Stop reason: HOSPADM

## 2025-04-28 RX ORDER — METHYLERGONOVINE MALEATE 0.2 MG/ML
200 INJECTION INTRAVENOUS
Status: DISCONTINUED | OUTPATIENT
Start: 2025-04-28 | End: 2025-04-28 | Stop reason: HOSPADM

## 2025-04-28 RX ORDER — MISOPROSTOL 200 UG/1
800 TABLET ORAL
Status: DISCONTINUED | OUTPATIENT
Start: 2025-04-28 | End: 2025-04-30 | Stop reason: HOSPADM

## 2025-04-28 RX ORDER — FENTANYL CITRATE 50 UG/ML
100 INJECTION, SOLUTION INTRAMUSCULAR; INTRAVENOUS
Status: DISCONTINUED | OUTPATIENT
Start: 2025-04-28 | End: 2025-04-28 | Stop reason: HOSPADM

## 2025-04-28 RX ORDER — AMOXICILLIN 250 MG
2 CAPSULE ORAL
Status: DISCONTINUED | OUTPATIENT
Start: 2025-04-28 | End: 2025-04-30 | Stop reason: HOSPADM

## 2025-04-28 RX ORDER — KETOROLAC TROMETHAMINE 15 MG/ML
15 INJECTION, SOLUTION INTRAMUSCULAR; INTRAVENOUS
Status: COMPLETED | OUTPATIENT
Start: 2025-04-28 | End: 2025-04-28

## 2025-04-28 RX ORDER — METHYLERGONOVINE MALEATE 0.2 MG/ML
200 INJECTION INTRAVENOUS
Status: DISCONTINUED | OUTPATIENT
Start: 2025-04-28 | End: 2025-04-30 | Stop reason: HOSPADM

## 2025-04-28 RX ORDER — CITRIC ACID/SODIUM CITRATE 334-500MG
30 SOLUTION, ORAL ORAL
Status: DISCONTINUED | OUTPATIENT
Start: 2025-04-28 | End: 2025-04-28 | Stop reason: HOSPADM

## 2025-04-28 RX ORDER — BISACODYL 10 MG
10 SUPPOSITORY, RECTAL RECTAL DAILY PRN
Status: DISCONTINUED | OUTPATIENT
Start: 2025-04-28 | End: 2025-04-30 | Stop reason: HOSPADM

## 2025-04-28 RX ORDER — OXYTOCIN/0.9 % SODIUM CHLORIDE 30/500 ML
340 PLASTIC BAG, INJECTION (ML) INTRAVENOUS CONTINUOUS PRN
Status: DISCONTINUED | OUTPATIENT
Start: 2025-04-28 | End: 2025-04-28 | Stop reason: HOSPADM

## 2025-04-28 RX ORDER — TRANEXAMIC ACID 10 MG/ML
1 INJECTION, SOLUTION INTRAVENOUS EVERY 30 MIN PRN
Status: DISCONTINUED | OUTPATIENT
Start: 2025-04-28 | End: 2025-04-28 | Stop reason: HOSPADM

## 2025-04-28 RX ORDER — OXYTOCIN/0.9 % SODIUM CHLORIDE 30/500 ML
340 PLASTIC BAG, INJECTION (ML) INTRAVENOUS CONTINUOUS PRN
Status: DISCONTINUED | OUTPATIENT
Start: 2025-04-28 | End: 2025-04-30 | Stop reason: HOSPADM

## 2025-04-28 RX ORDER — PROCHLORPERAZINE MALEATE 10 MG
10 TABLET ORAL EVERY 6 HOURS PRN
Status: DISCONTINUED | OUTPATIENT
Start: 2025-04-28 | End: 2025-04-28 | Stop reason: HOSPADM

## 2025-04-28 RX ORDER — CARBOPROST TROMETHAMINE 250 UG/ML
250 INJECTION, SOLUTION INTRAMUSCULAR
Status: DISCONTINUED | OUTPATIENT
Start: 2025-04-28 | End: 2025-04-30 | Stop reason: HOSPADM

## 2025-04-28 RX ORDER — MISOPROSTOL 200 UG/1
400 TABLET ORAL
Status: DISCONTINUED | OUTPATIENT
Start: 2025-04-28 | End: 2025-04-28 | Stop reason: HOSPADM

## 2025-04-28 RX ORDER — IBUPROFEN 800 MG/1
800 TABLET, FILM COATED ORAL
Status: COMPLETED | OUTPATIENT
Start: 2025-04-28 | End: 2025-04-28

## 2025-04-28 RX ORDER — METOCLOPRAMIDE 10 MG/1
10 TABLET ORAL EVERY 6 HOURS PRN
Status: DISCONTINUED | OUTPATIENT
Start: 2025-04-28 | End: 2025-04-28 | Stop reason: HOSPADM

## 2025-04-28 RX ORDER — ONDANSETRON 2 MG/ML
4 INJECTION INTRAMUSCULAR; INTRAVENOUS EVERY 6 HOURS PRN
Status: DISCONTINUED | OUTPATIENT
Start: 2025-04-28 | End: 2025-04-28 | Stop reason: HOSPADM

## 2025-04-28 RX ORDER — IBUPROFEN 800 MG/1
800 TABLET, FILM COATED ORAL EVERY 6 HOURS PRN
Status: DISCONTINUED | OUTPATIENT
Start: 2025-04-28 | End: 2025-04-30 | Stop reason: HOSPADM

## 2025-04-28 RX ADMIN — PSYLLIUM HUSK 1 PACKET: 3.4 POWDER ORAL at 20:37

## 2025-04-28 RX ADMIN — OXYTOCIN 10 UNITS: 10 INJECTION, SOLUTION INTRAMUSCULAR; INTRAVENOUS at 19:19

## 2025-04-28 RX ADMIN — IBUPROFEN 800 MG: 800 TABLET ORAL at 19:21

## 2025-04-28 RX ADMIN — ESCITALOPRAM 5 MG: 5 TABLET, FILM COATED ORAL at 20:36

## 2025-04-28 RX ADMIN — ACETAMINOPHEN 650 MG: 325 TABLET, FILM COATED ORAL at 19:21

## 2025-04-28 RX ADMIN — ONDANSETRON 4 MG: 4 TABLET, ORALLY DISINTEGRATING ORAL at 18:34

## 2025-04-28 ASSESSMENT — ACTIVITIES OF DAILY LIVING (ADL)
ADLS_ACUITY_SCORE: 15

## 2025-04-28 NOTE — PROGRESS NOTES
Concerns: had contractions after her last visit but no onset of labor.      Today more regular contractions- starting on her way here every 5-10minutes.  Pushing fluids.  Had increased mucous discharge yesterday.  No bleeding.  No LOF.  No headaches.  Nausea this morning.  No RUQ.  Legs a little more swollen today.      Doing well.  No concerns today.  No vaginal bleeding, LOF, contractions.  No HA, RUQ pain, N/V, visual changes.  Discussed signs of labor and when to call or come in.  Discussed kick counts and fetal movement.  Reportable signs and symptoms discussed.  Labor precautions discussed.  RTC 1 week.    Lorie Lee MD

## 2025-04-28 NOTE — H&P
Maternal Admission H&P  [facility]  Date of Admission: 2025  Date of Service: 2025    Name      Kevin Urena         2000  MRN       [id number]  PCP        Rafia Hess at [facility], [phone].    ________________________________________________________________________    Assessment and Plan:  24 year old  at 38w2d.  Baby AGA. Anticipate .  Group B strep: neg  H/o mental health and substance use- stable and sober with this pregnancy without meds.   Intermittent asthma- asymptomatic at this time  ________________________________________________________________________    Chief Complaint: labor     HPI: Kevin Urena is a 24 year old woman at 38w2d, based on LMP with Estimated Date of Delivery: May 10, 2025. Patient presents to L&D with strong contractions. No LOF or bleeding.  Had routine OB visit this morning with mild regular contractions.        Prenatal Course:  She presented to [facility]  Clinic at 8 weeks gestation for regular prenatal care.  Interested in waterbirth so transferred to Dr Lee. Total weight gain 17.3 kg (38 lb 4 oz).    Prenatal complications included history of mental health and substance use but sober and well controlled with lexapro 5mg daily this pregnancy.      Has h/o asthma but does not use unless she has symptoms.  No use in past month     Vaccinations in pregnancy included declined all vaccines in pregnancy     Patient Active Problem List    Diagnosis Date Noted    Encounter for triage in pregnant patient 2025     Priority: Medium    Supervision of normal pregnancy 2025     Priority: Medium    Chalazion of right upper eyelid 2025     Priority: Medium    Mild intermittent asthma without complication 2025     Priority: Medium    Alcohol use disorder, moderate, in early remission (H) 10/15/2024     Priority: Medium     Cognitive complaints 10/15/2024     Priority: Medium    Major depressive disorder, recurrent episode, in partial remission 10/15/2024     Priority: Medium    Generalized anxiety disorder 2023     Priority: Medium    Attention deficit hyperactivity disorder (ADHD) 2022     Priority: Medium    Episodic mood disorder 2022     Priority: Medium     Engaged with [place] therapist and has an appointment with psychiatry @ 34 weeks.      Disruptive behavior disorder      Priority: Medium      OB History    Para Term  AB Living   3 1 1 0 1 1   SAB IAB Ectopic Multiple Live Births   0 1 0 0 1      # Outcome Date GA Lbr Broderick/2nd Weight Sex Type Anes PTL Lv   3 Current            2 Term 23 38w6d  3.09 kg (6 lb 13 oz) M Vag-Spont None N STEPHY      Name: Kervin Obregon IAB 05/15/22 10w1d             Birth Comments: pills, no comp     Review of Systems Negative except what is noted in HPI.   Past Medical History:   Diagnosis Date    ADHD (attention deficit hyperactivity disorder)     Anxiety     Asthma     Created by Conversion     Autism     Depression       No past surgical history on file.Azithromycin, Doxycycline, and Hemabate [carboprost tromethamine]  Family History   Problem Relation Age of Onset    Diabetes Mother     Cervical Cancer Mother         early 30's at diagnosis    Ovarian cysts Mother     No Known Problems Father     Diabetes Maternal Grandmother     Hyperlipidemia Maternal Grandmother     Heart Disease Maternal Grandmother     Breast Cancer Maternal Grandmother     Dementia Maternal Grandmother     Alzheimer Disease Paternal Grandmother     Asthma Sister     Allergies Sister      Social History     Socioeconomic History    Marital status: Single     Spouse name: Not on file    Number of children: 0    Years of education: Not on file    Highest education level: 11th grade   Occupational History    Not on file   Tobacco Use    Smoking status: Never     Passive exposure:  Never    Smokeless tobacco: Never   Vaping Use    Vaping status: Never Used   Substance and Sexual Activity    Alcohol use: Not Currently    Drug use: Not Currently     Comment: marijuana when not pregnant    Sexual activity: Not Currently     Partners: Male     Birth control/protection: None   Other Topics Concern    Not on file   Social History Narrative    In GED program starting 7/2023     Social Drivers of Health     Financial Resource Strain: High Risk (1/11/2024)    Financial Resource Strain     Within the past 12 months, have you or your family members you live with been unable to get utilities (heat, electricity) when it was really needed?: Yes   Food Insecurity: High Risk (1/11/2024)    Food Insecurity     Within the past 12 months, did you worry that your food would run out before you got money to buy more?: Yes     Within the past 12 months, did the food you bought just not last and you didn t have money to get more?: Yes   Transportation Needs: High Risk (1/11/2024)    Transportation Needs     Within the past 12 months, has lack of transportation kept you from medical appointments, getting your medicines, non-medical meetings or appointments, work, or from getting things that you need?: Yes   Physical Activity: Not on file   Stress: Not on file   Social Connections: Not on file   Interpersonal Safety: Low Risk  (12/9/2024)    Interpersonal Safety     Do you feel physically and emotionally safe where you currently live?: Yes     Within the past 12 months, have you been hit, slapped, kicked or otherwise physically hurt by someone?: No     Within the past 12 months, have you been humiliated or emotionally abused in other ways by your partner or ex-partner?: No   Housing Stability: High Risk (1/11/2024)    Housing Stability     Do you have housing? : No     Are you worried about losing your housing?: Yes     Prior to Admission Medication List  Medications Prior to Admission   Medication Sig Dispense Refill  Last Dose/Taking    budesonide-formoterol (SYMBICORT) 80-4.5 MCG/ACT Inhaler Inhale 2 puffs once daily plus 1-2 puffs as needed. May use up to 12 puffs per day. 20.4 g 11     doxylamine (UNISOM) 25 MG TABS tablet Take 1 tablet (25 mg) by mouth nightly as needed for sleep. 30 tablet 1     escitalopram (LEXAPRO) 5 MG tablet Take 1 tablet (5 mg) by mouth daily. 30 tablet 5     ondansetron (ZOFRAN ODT) 4 MG ODT tab Take 1 tablet (4 mg) by mouth every 8 hours as needed for nausea. 30 tablet 6     Prenatal MV-Min-Fe Fum-FA-DHA (PRENATAL MULTIVITAMIN PLUS DHA) 27-0.8-250 MG CAPS Take 1 tablet by mouth daily. 30 capsule 11       Allergies  Allergies   Allergen Reactions    Azithromycin Hives    Doxycycline Hives    Hemabate [Carboprost Tromethamine]      asthma     Immunization History   Administered Date(s) Administered    COVID-19 Monovalent 12+ (Pfizer 2022) 07/01/2022    DTaP, Unspecified 11/01/2001, 05/17/2002, 08/13/2002, 07/20/2006, 01/23/2008    Flu, Unspecified 01/23/2008    HIB, Unspecified 08/13/2001, 11/01/2001, 05/07/2002    HPV Quadrivalent 12/12/2012, 12/17/2013, 10/10/2014    HepA, Unspecified 01/23/2008, 03/18/2010    HepB, Unspecified 2000, 08/13/2001, 11/01/2001    MMR (MMRII) 01/03/2002, 07/20/2006    Meningococcal ACWY (Menactra ) 12/12/2012    Pneumococcal (PCV 7) 08/13/2001, 02/08/2002    Poliovirus, inactivated (IPV) 08/13/2001, 11/01/2001, 07/20/2006, 01/23/2008    TDAP (Adacel,Boostrix) 12/12/2012    Varicella (Varivax) 02/28/2002, 01/23/2008      Physical Exam  No data found.  Wt Readings from Last 1 Encounters:   04/28/25 65.3 kg (144 lb)   Prepregnancy: 48 kg (105 lb 12 oz), BMI 19.66. Total gain: 17.3 kg (38 lb 4 oz) (expected gain: 11.5 kg (25 lb)-16 kg (35 lb)).    HEART: RRR, no murmur  LUNGS: CTA bilaterally  Fetal Heart Tones: Baseline 140 bpm, variability moderate (6-25 bpm), no decelerations. Reactive.  CONTRACTIONS:  regular, every 2-3 minutes.  CERVIX: dilation 7-8 cm , 90%  effaced, soft, and -1 station.  FLUID: None noted.  Fetal Presentation: vertex.  Labs    Group B Strep PCR   Date Value Ref Range Status   04/16/2025 Negative Negative Final     Comment:     Presumed negative for Streptococcus agalactiae (Group B Streptococcus) or the number of organisms may be below the limit of detection of the assay.      Antibody Screen   Date Value Ref Range Status   09/23/2024 Negative Negative Final     Hepatitis B Surface Antigen   Date Value Ref Range Status   09/23/2024 Nonreactive Nonreactive Final     Chlamydia Trachomatis PCR   Date Value Ref Range Status   04/27/2023 Negative Negative Final     N Gonorrhea PCR   Date Value Ref Range Status   04/27/2023 Negative Negative Final     Hemoglobin   Date Value Ref Range Status   02/03/2025 12.4 11.7 - 15.7 g/dL Final      Prenatal Office Visit on 04/28/2025   Component Date Value Ref Range Status    Glucose Urine 04/28/2025 Negative  Negative mg/dL Final    Ketones Urine 04/28/2025 Trace (A)  Negative mg/dL Final    Protein Albumin Urine 04/28/2025 30 (A)  Negative mg/dL Final        Completed by:   Lorie Lee MD  [facility]  4/28/2025 6:13 PM   used: Not needed.

## 2025-04-28 NOTE — PATIENT INSTRUCTIONS
Week 38 of Your Pregnancy: Care Instructions  Believe it or not, your baby is almost here. You may notice how your baby responds to sounds, warmth, cold, and light. You may even know what kind of music your baby likes.    Even if you expect a vaginal birth, it's a good idea to learn about  section ().  is the delivery of a baby through a cut (incision) in your belly. It's a major surgery, so it has more risks than a vaginal birth.   During the first 2 weeks after the birth, limit visitors. Don't allow visitors who have colds or infections. Ask visitors to wash their hands before they touch your baby. And never let anyone smoke around your baby.     Know about unplanned C-sections.  Reasons for an unplanned  include labor that slows or stops, signs of distress in your baby, and high blood pressure or other problems for you.     Know about planned C-sections.  If your baby isn't in a head-down position for delivery (breech position), your doctor may plan a . Or you may have a planned  if you're having twins or more.     Get as much help as you can while you're in the hospital.  You can learn about feeding, diapering, and bathing your baby.     Talk to your doctor or midwife about how to care for the umbilical cord stump.  If your baby will be circumcised, also ask about how to care for that.     Ask friends or family for help, as you need it.  If you can, have another adult in your home for at least 2 or 3 days after the birth.     Try to nap when your baby naps.  This may be your best chance to get some sleep.     Watch for changes in your mental health.  For the first 1 to 2 weeks after birth, it's common to cry or feel sad or irritable. If these feelings last for more than 2 weeks, you may have postpartum depression. Ask your doctor for help. Postpartum depression can be treated.   Follow-up care is a key part of your treatment and safety. Be sure to make and go  "to all appointments, and call your doctor if you are having problems. It's also a good idea to know your test results and keep a list of the medicines you take.  Where can you learn more?  Go to https://www.Soundflavor.net/patiented  Enter B044 in the search box to learn more about \"Week 38 of Your Pregnancy: Care Instructions.\"  Current as of: April 30, 2024  Content Version: 14.4    9186-7009 [employer].   Care instructions adapted under license by your healthcare professional. If you have questions about a medical condition or this instruction, always ask your healthcare professional. [employer] disclaims any warranty or liability for your use of this information.    "

## 2025-04-28 NOTE — TELEPHONE ENCOUNTER
----- Message from Lorie Lee sent at 4/28/2025 10:57 AM CDT -----  Please schedule ob visit next Monday 5/5 at 1020 am

## 2025-04-28 NOTE — TELEPHONE ENCOUNTER
Future Appointments 4/28/2025 - 10/25/2025        Date Visit Type Length Department Provider     5/5/2025 10:20 AM RETURN - OB VISIT 20 min [facility]/OB Lorie Lee MD    Location Instructions:     [facility] is located at [address] [place] [address] in [place], at the intersection of [facility]. This is one block south of the [facility]. Free parking is available in the lot directly north of the clinic across [facility]. The clinic is near stops along bus [address] and [address].

## 2025-04-29 ENCOUNTER — TELEPHONE (OUTPATIENT)
Dept: FAMILY MEDICINE | Facility: CLINIC | Age: 25
End: 2025-04-29

## 2025-04-29 PROBLEM — D62 ANEMIA DUE TO BLOOD LOSS, ACUTE: Status: ACTIVE | Noted: 2025-04-29

## 2025-04-29 LAB
HGB BLD-MCNC: 10.1 G/DL (ref 11.7–15.7)
T PALLIDUM AB SER QL: NONREACTIVE

## 2025-04-29 PROCEDURE — 99207 PR SUBSEQUENT HOSPITAL CARE FOR MOTHER, 15 MINUTES: CPT | Performed by: FAMILY MEDICINE

## 2025-04-29 PROCEDURE — 120N000001 HC R&B MED SURG/OB

## 2025-04-29 PROCEDURE — 36415 COLL VENOUS BLD VENIPUNCTURE: CPT | Performed by: FAMILY MEDICINE

## 2025-04-29 PROCEDURE — 85018 HEMOGLOBIN: CPT | Performed by: FAMILY MEDICINE

## 2025-04-29 PROCEDURE — 250N000013 HC RX MED GY IP 250 OP 250 PS 637: Performed by: FAMILY MEDICINE

## 2025-04-29 RX ORDER — IBUPROFEN 800 MG/1
800 TABLET, FILM COATED ORAL EVERY 6 HOURS PRN
Qty: 30 TABLET | Refills: 0 | Status: SHIPPED | OUTPATIENT
Start: 2025-04-29

## 2025-04-29 RX ORDER — ACETAMINOPHEN 325 MG/1
650 TABLET ORAL EVERY 4 HOURS PRN
Qty: 30 TABLET | Refills: 0 | Status: SHIPPED | OUTPATIENT
Start: 2025-04-29

## 2025-04-29 RX ORDER — POLYETHYLENE GLYCOL 3350 17 G/17G
17 POWDER, FOR SOLUTION ORAL DAILY
Qty: 510 G | Refills: 0 | Status: SHIPPED | OUTPATIENT
Start: 2025-04-29

## 2025-04-29 RX ADMIN — PSYLLIUM HUSK 1 PACKET: 3.4 POWDER ORAL at 08:33

## 2025-04-29 RX ADMIN — ACETAMINOPHEN 650 MG: 325 TABLET, FILM COATED ORAL at 13:30

## 2025-04-29 RX ADMIN — ACETAMINOPHEN 650 MG: 325 TABLET, FILM COATED ORAL at 18:20

## 2025-04-29 RX ADMIN — ACETAMINOPHEN 650 MG: 325 TABLET, FILM COATED ORAL at 06:22

## 2025-04-29 RX ADMIN — IBUPROFEN 800 MG: 800 TABLET ORAL at 13:31

## 2025-04-29 RX ADMIN — ACETAMINOPHEN 650 MG: 325 TABLET, FILM COATED ORAL at 22:47

## 2025-04-29 RX ADMIN — IBUPROFEN 800 MG: 800 TABLET ORAL at 19:47

## 2025-04-29 RX ADMIN — IBUPROFEN 800 MG: 800 TABLET ORAL at 06:22

## 2025-04-29 ASSESSMENT — ACTIVITIES OF DAILY LIVING (ADL)
ADLS_ACUITY_SCORE: 19
ADLS_ACUITY_SCORE: 19
ADLS_ACUITY_SCORE: 24
ADLS_ACUITY_SCORE: 19
ADLS_ACUITY_SCORE: 24
ADLS_ACUITY_SCORE: 19
ADLS_ACUITY_SCORE: 24
ADLS_ACUITY_SCORE: 24
ADLS_ACUITY_SCORE: 19
ADLS_ACUITY_SCORE: 24
ADLS_ACUITY_SCORE: 19

## 2025-04-29 NOTE — PROGRESS NOTES
D:  Patient admitted to [facility]-2 via wheelchair with  and support person Rakan.  A:  Bedside report received from Susanne. Oriented patient and family to surroundings; call light within reach. 4 Part ID bands double checked with reporting RN.  R:  Patient and  tolerated transfer. Care assumed.    Nancy Sandra RN on 2025 at 10:16 PM

## 2025-04-29 NOTE — L&D DELIVERY NOTE
Delivery Summary  [facility]  Date of Service: 2025    Name      Kevin Urena         2000  MRN       [id number]  PCP        Rafia Moran at [facility], [phone].    GA: 38w2d  GP:   Labor Complications: None  Additional Complications:    QBL:    Delivery Type: Vaginal, Spontaneous  Duration of Ruptured Membranes: 0h 12m   Weight:    Apgar scores:  ,     6  _________________    Prenatal Course:  Kevin Urena is a 24 year old  at 38w2d based on lmp.  She presented to [facility] at 8 weeks gestation for regular prenatal care.  She had approximately 17.3 kg (38 lb 4 oz) weight gain.  Fundal height was congruent with dates.  U/a and BP were normal throughout.      Prenatal complications included history of mental health and substance use but sober and well controlled with lexapro 5mg daily this pregnancy.         Vaccinations in pregnancy included none- pt declined all vaccines in pregnancy.      DELIVERY NARRATIVE  Stage 1:   Patient presented to Maternity Care on 2025 with strong contractions.  Onset of labor at 1700  Patient progressed to complete with nothing.  Stage 1 was uncomplicated.    Induction no.  Augmentation no  Monitors: external showed category 1.    Labor Analgesia/Anesthesia: none  ROM: SROM- thick meconium  Her group B Strep (GBS) carrier status was negative..    Stage 2:  Delivery was via vaginal, spontaneous to a sterile field with mom in hands and knees position. Infant delivered in vertex left occiput anterior position. Shoulders delivered without difficulty. The baby was placed on the patient's abdomen.  Cord complications: nuchal delivered through. Delayed cord clamping was performed.  The cord was doubly clamped and no cut   were noted.  NICU delivery team present for delivery and assessed the baby after 1 minute      Apgars of  and  via Vaginal,  SpontaneousDelivery.    Delivery was complicated by nothing    Stage 3:   Placenta delivered at 2025  7:09 PM. Placental disposition was Patient possesion. Fundal massage performed and fundus found to be  firm. The following uterotonics were given: Pitocin (IM). Perineum, vagina, cervix were inspected by provider and the following lacerations were noted: None.    QBL: 0 mL.      Firm fundus noted without active bleeding at the end of her evaluation with excellent hemostasis noted.    Cord pH obtained: na    Stage 4:  Mom and baby are both stable in delivery room.  Mom plans to breast feed baby.  Vaccines declined.   Plans for birth control undecided.  Anticipate discharge for mom and baby within 48 hours. Mom plans to take home placenta.    [place] Nena Urena [[id number]]      Labor Event Times      Latent labor onset date/time: 2025 1700    Active labor onset date: 25 Onset time:  6:11 PM   Dilation complete date: 25 Complete time:  7:00 PM   Start pushing date/time: 2025 1900          Labor Events     labor?: No   steroids: None  Labor Type: Spontaneous  Predominate monitoring during 1st stage: continuous electronic fetal monitoring     Antibiotics received during labor?: No     Rupture identifier: Sac 1  Rupture date/time: 25 1850   Rupture type: Artificial Rupture of Membranes  Fluid color: Meconium  Fluid odor: Normal     Augmentation: None       Delivery/Placenta Date and Time      Delivery Date: 25 Delivery Time:  7:02 PM   Placenta Date/Time: 2025  7:09 PM  Delivering clinician: Lorie Lee MD   Other personnel present at delivery:  Provider Role   Susanne Nelson RN Thor, Choua, RN Dabruzzi, Jacquelyn M, RN              Vaginal Counts       Initial count performed by 2 team members:  Two Team Members   Rosa Roger         Needles Suture Needles Sponges (RETIRED) Instruments   Initial counts 0 0 5    Added to count       Relief  counts       Final counts               Placed during labor Accounted for at the end of labor   FSE NA NA   IUPC NA NA   Cervidil NA NA                             Cord      Cord Complications: Nuchal   Nuchal Intervention: delivered through         Nuchal cord description: loose nuchal cord         Stem cell collection?: No           Labor Events and Shoulder Dystocia    Fetal Tracing Prior to Delivery: Category 2       Delivery (Maternal) (Provider to Complete) ([id number])    Episiotomy: None  Perineal lacerations: None    Repair suture: None  Genital tract inspection done: Pos       Blood Loss  Mother: Kevin Urena #[id number]     Start of Mother's Information      Delivery Blood Loss   Intrapartum & Postpartum: 04/28/25 1811 - 04/28/25 1921    Delivery Admission: 04/28/25 1805 - 04/28/25 1921         Intrapartum & Postpartum Delivery Admission    None                  End of Mother's Information  Mother: Kevin Urena #[id number]                Delivery - Provider to Complete ([id number])    Delivering clinician: Lorie Lee MD  Delivery Type (Choose the 1 that will go to the Birth History): Vaginal, Spontaneous                         Other personnel:  Provider Role   Susanne Nelson RN Thor, Choua, RN Dabruzzi, Jacquelyn M, RN                     Placenta    Date/Time: 4/28/2025  7:09 PM  Removal: Spontaneous  Disposition: Patient possesion             Anesthesia    Method: None                    Presentation and Position    Presentation: Vertex    Position: Left Occiput Anterior                     Completed by:   Lorie Lee MD  [facility]  4/28/2025 7:21 PM   used: Not needed.

## 2025-04-29 NOTE — PROGRESS NOTES
Data: Kevin Urena transferred to [facility]/BDGD58-2 via wheelchair. Patient transferred to Postpartum with .    Action: Receiving unit notified of transfer. Patient and support person notified of room change. Patient and belongings accompanied by Registered Nurse. Bedside report given to Nancy HESTER Fundal check performed with receiving RN. Oriented patient to surroundings. Call light within reach.    Response: Patient tolerated transfer.    Susanne Nelson RN  2025 10:15 PM

## 2025-04-29 NOTE — TELEPHONE ENCOUNTER
Future Appointments 4/29/2025 - 10/26/2025        Date Visit Type Length Department Provider     5/28/2025 10:40 AM POST PARTUM 20 min [facility]/OB Lorie Lee MD    Location Instructions:     [facility] is located at [address] [place] [address] in [place], at the intersection of [facility]. This is one block south of the [facility]. Free parking is available in the lot directly north of the clinic across [facility]. The clinic is near stops along bus [address] and [address].

## 2025-04-29 NOTE — CONSULTS
Attempted to see Mother today x2, resting both times. Bedside RN stated that Mother had changed her mind in wanting to see LC. Discharge education placed in AVS in case of 24 hour discharge.

## 2025-04-29 NOTE — PROGRESS NOTES
Late entry:  Data: Patient presented to [facility]: 2025  6:05 PM.  Reason for maternal/fetal assessment is uterine contractions. Patient reports contractions since 1700. Patient denies leaking of vaginal fluid/rupture of membranes, vomiting, headache, visual disturbances, epigastric or RUQ pain. Patient reports fetal movement is normal. Patient is a 38w2d . Prenatal record reviewed. Pregnancy has been complicated by asthma, anxiety, depression, in remission from alcohol use disorder . Support person, her mother, Claudia, is present.     Fetal HR baseline was 135, variability is moderate (amplitude range 6 to 25 bpm). Accelerations: absent. Decelerations: variable. Uterine assessment is strong by palpation during contractions and soft by palpation at rest. Cervix 10 cm dilated and 100% effaced. Fetal station 1. Fetal presentation cephalic per cervical exam. Membranes: intact and bulging.    Vital signs wnl. Patient reports pain with contractions and planning a low intervention labor with non-pharmacologic comfort measures and is coping with coaching, encouragement and reassurance.    Action: Verbal consent for EFM. Triage assessment completed. Patient admitted and Dr. Lee updated regarding arrival status and coming for delivery.

## 2025-04-29 NOTE — PLAN OF CARE
Goal Outcome Evaluation:      Plan of Care Reviewed With: patient    Overall Patient Progress: improving    Outcome Evaluation: Keyairrah's vital signs and OB assessments WNL. Fundus is firm, bleeding is light. Passed voiding trials. Pain controlled with prn tylenol and ibuprofen. Breastfeeding infant and requested to set up breast pump. Patient's mother is attentive at the bedside.    Nancy Sandra RN on 4/29/2025 at 5:11 AM

## 2025-04-29 NOTE — TELEPHONE ENCOUNTER
----- Message from Lorie Lee sent at 4/28/2025  7:33 PM CDT -----  Please schedule pt for postpartum/annual physical in 4 weeks with me   DB as needed

## 2025-04-29 NOTE — PLAN OF CARE
Problem: Adult Inpatient Plan of Care  Goal: Optimal Comfort and Wellbeing  Outcome: Progressing  Intervention: Provide Person-Centered Care  Recent Flowsheet Documentation  Taken 4/29/2025 0858 by Vonnie Schuler, RN  Trust Relationship/Rapport:   care explained   choices provided   questions answered   questions encouraged   reassurance provided   Goal Outcome Evaluation:      Plan of Care Reviewed With: patient    Overall Patient Progress: improvingOverall Patient Progress: improving    Outcome Evaluation: OB checks, vs and Pain all WDL. Mom took tylenol and ibuprofen today with good relief. Breast feeding going well, baby sleepy at times. Pt desires discharge tonight if appropriate for baby. Dr Louis is aware.

## 2025-04-30 VITALS
TEMPERATURE: 98.3 F | HEIGHT: 61 IN | RESPIRATION RATE: 16 BRPM | SYSTOLIC BLOOD PRESSURE: 118 MMHG | HEART RATE: 76 BPM | OXYGEN SATURATION: 100 % | WEIGHT: 129.8 LBS | DIASTOLIC BLOOD PRESSURE: 73 MMHG | BODY MASS INDEX: 24.51 KG/M2

## 2025-04-30 PROCEDURE — 99207 PR SUBSEQUENT HOSPITAL CARE FOR MOTHER, 15 MINUTES: CPT | Performed by: FAMILY MEDICINE

## 2025-04-30 PROCEDURE — 250N000013 HC RX MED GY IP 250 OP 250 PS 637: Performed by: FAMILY MEDICINE

## 2025-04-30 RX ADMIN — IBUPROFEN 800 MG: 800 TABLET ORAL at 08:51

## 2025-04-30 RX ADMIN — ACETAMINOPHEN 650 MG: 325 TABLET, FILM COATED ORAL at 08:51

## 2025-04-30 RX ADMIN — ESCITALOPRAM 5 MG: 5 TABLET, FILM COATED ORAL at 08:51

## 2025-04-30 RX ADMIN — PSYLLIUM HUSK 1 PACKET: 3.4 POWDER ORAL at 08:51

## 2025-04-30 ASSESSMENT — ACTIVITIES OF DAILY LIVING (ADL)
ADLS_ACUITY_SCORE: 24

## 2025-04-30 NOTE — PLAN OF CARE
Problem: Adult Inpatient Plan of Care  Goal: Plan of Care Review  Description: The Plan of Care Review/Shift note should be completed every shift.  The Outcome Evaluation is a brief statement about your assessment that the patient is improving, declining, or no change.  This information will be displayed automatically on your shiftnote.  Outcome: Progressing     Problem: Adult Inpatient Plan of Care  Goal: Optimal Comfort and Wellbeing  Intervention: Monitor Pain and Promote Comfort  Recent Flowsheet Documentation  Taken 4/29/2025 2130 by Kaitlynn Izquierdo RN  Pain Management Interventions: declines  Taken 4/29/2025 2015 by Kaitlynn Izquierdo RN  Pain Management Interventions: declines  Taken 4/29/2025 1947 by Kaitlynn Izquierdo RN  Pain Management Interventions: medication (see MAR)  Taken 4/29/2025 1850 by Kaitlynn Izquierdo RN  Pain Management Interventions: declines  Taken 4/29/2025 1820 by Kaitlynn Izquierdo RN  Pain Management Interventions: medication (see MAR)  Taken 4/29/2025 1814 by Kaitlynn Izquierdo RN  Pain Management Interventions: declines  Taken 4/29/2025 1535 by Kaitlynn Izquierdo RN  Pain Management Interventions: declines   Goal Outcome Evaluation:                    Pt has been utilizing ibuprofen and tylenol during shift. Pt currently denies any pain rating 0/10. Pt will continue to rate pain less than 3/10 throughout shift. Pt decided she would like to discharge tomorrow instead of this evening. Dr Louis updated and discharge canceled. Education provided and reviewed. Addressed any questions and concerns. Will continue to monitor.

## 2025-04-30 NOTE — DISCHARGE SUMMARY
[facility]    Post-Partum Progress Note    Assessment & Plan   Assessment:  Post-partum day #1  Normal spontaneous vaginal delivery    Doing well.  No excessive bleeding  Pain well-controlled.    Plan:  Ambulation encouraged  Offered iron supplementation, patient notes she does not tolerate this. Encouraged iron rich diet.   Anticipate discharge tomorrow    Davida Louis MD     Interval History   Doing well.  Pain is adequately controlled.  No fevers.  No history of foul-smelling vaginal discharge.  Good appetite.  Denies chest pain, shortness of breath, nausea or vomiting.  Vaginal bleeding is similar to a heavy menstrual flow.  Breastfeeding well.  Anemia noted.     Medications   Current Facility-Administered Medications   Medication Dose Route Frequency Provider Last Rate Last Admin    nitrous oxide/oxygen 50/50 blend   Inhalation Continuous PRN Lorie Lee MD        oxytocin (PITOCIN) 30 units in 500 mL 0.9% NaCl infusion  100-340 mL/hr Intravenous Continuous PRN Lorie Lee MD        oxytocin (PITOCIN) 30 units in 500 mL 0.9% NaCl infusion  340 mL/hr Intravenous Continuous PRN Lorie Lee MD         Current Facility-Administered Medications   Medication Dose Route Frequency Provider Last Rate Last Admin    escitalopram (LEXAPRO) tablet 5 mg  5 mg Oral Daily Lorie Lee MD   5 mg at 04/28/25 2036    psyllium (METAMUCIL/KONSYL) Packet 1 packet  1 packet Oral Daily Lorie Lee MD   1 packet at 04/29/25 0833       Physical Exam   Temp: 98  F (36.7  C) Temp src: Oral BP: 128/70 Pulse: 77   Resp: 18 SpO2: 98 % O2 Device: None (Room air)    Vitals:    04/28/25 1821   Weight: 65.3 kg (144 lb)     Vital Signs with Ranges  Temp:  [98  F (36.7  C)-98.3  F (36.8  C)] 98  F (36.7  C)  Pulse:  [76-78] 77  Resp:  [16-20] 18  BP: (104-128)/(60-76) 128/70  SpO2:  [97 %-98 %] 98 %  I/O last 3 completed shifts:  In: -   Out: 1127 [Urine:500; Blood:627]    Uterine fundus is firm, non-tender  and at the level of the umbilicus    Data   Recent Labs   Lab Test 04/28/25 1837   AS Negative     Recent Labs   Lab Test 04/29/25  0552 04/28/25 1837   HGB 10.1* 13.0     Recent Labs   Lab Test 09/23/24  0941   RUQIGG Positive

## 2025-04-30 NOTE — PLAN OF CARE
Goal Outcome Evaluation:      Plan of Care Reviewed With: patient    Overall Patient Progress: improvingOverall Patient Progress: improving    Outcome Evaluation: VSS, FFu/2, flow small. Pain 3/10, cramping. tylenol and ibuprofen given with good relief. Pt ready for discharge to home today. instructed on warning signs and follow up appointment. verbalized safe use of medication at home.  discharged with baby, significant other and grandma of baby.    D:  Patient desires discharge home.  Discharge orders received and entered by provider.  A:  Discharge instructions reviewed with the patient.  All questions and concerns addressed.  R:  Discharge criteria met.  4 Part ID bands double checked.  [place] discharged in car seat with parents.  The nursing assistant escorted patient to car .

## 2025-04-30 NOTE — PLAN OF CARE
"Patient's vitals and assessment within normal parameters.     /58 (BP Location: Right arm, Patient Position: Semi-Morales's, Cuff Size: Adult Regular)   Pulse 69   Temp 98  F (36.7  C) (Oral)   Resp 16   Ht 1.549 m (5' 1\")   Wt 65.3 kg (144 lb)   LMP 2024 (Exact Date)   SpO2 97%   Breastfeeding Unknown   BMI 27.21 kg/m      Patient currently denies pain. She is ambulating well on her own and voiding without difficulty.   Patient would like to discharge sometime today, . She is aware a provider order and patient education would need to be completed prior to this. She will watch the Shaken Baby Video. She expresses no urgency for discharge.   Patient's mother at bedside and supportive of patient and her .   Patient demonstrates caring, positive attachment behavior towards her .      Goal Outcome Evaluation:      Plan of Care Reviewed With: patient    Overall Patient Progress: improvingOverall Patient Progress: improving       Aruna Massey RN    "

## 2025-04-30 NOTE — LACTATION NOTE
This note was copied from a baby's chart.  Patient requested information regarding purchasing donor milk. Resources given. Patient reported that she has a hospital grade pump at home. Pumping education provided, as patient reported that she has sore nipples and has been pumping using max suction strength. Declined help/assistance with latch/breastfeeding.    Mary Chapin RN on 4/30/2025 at 8:55 AM

## 2025-05-06 ENCOUNTER — PATIENT OUTREACH (OUTPATIENT)
Dept: CARE COORDINATION | Facility: CLINIC | Age: 25
End: 2025-05-06
Payer: COMMERCIAL

## 2025-05-06 NOTE — PROGRESS NOTES
Clinic Care Coordination Contact  Community Health Worker Follow Up    Care Gaps:     Health Maintenance Due   Topic Date Due    ADVANCE CARE PLANNING  Never done    YEARLY PREVENTIVE VISIT  05/09/2018    Pneumococcal Vaccine: Pediatrics (0 to 5 Years) and At-Risk Patients (6 to 49 Years) (1 of 2 - PCV) 10/23/2019    DTAP/TDAP/TD IMMUNIZATION (6 - Td or Tdap) 12/12/2022    COVID-19 Vaccine (2 - 2024-25 season) 09/01/2024    ASTHMA ACTION PLAN  01/12/2025            Care Plan:   Care Plan: Car Seat       Problem: HP GENERAL PROBLEM       Goal: [facility]       Start Date: 4/9/2025    This Visit's Progress: 100% Recent Progress: 30%    Priority: High    Note:     Barriers: homeless  Strengths: motivated to seek support, knowledgeable  Patient expressed understanding of goal: yes  Action steps to achieve this goal:  1. I will answer the phone when everyday [facility] calls  2. I will be available for car seat drop off/delivery  3. I will follow up with CCC at next outreach                               Intervention and Education during outreach: CHW spoke with patient. Patient reports she received no call or information regarding carseat assistance or delivery. Patient reports she is still interested in assistance with getting carseat and pack N play set for baby. Patient reports no other concerns or needs    CHW Plan: CHW reached out to lead CC to gather more information about the agency or program patient was referred to for carseat assistance. CHW will follow up with patient once information is received.    Shaina SchroederHugh [facility] Health Worker   [facility] Care Coordination  [email or web address]  Office: [phone]

## 2025-05-12 ENCOUNTER — PATIENT OUTREACH (OUTPATIENT)
Dept: CARE COORDINATION | Facility: CLINIC | Age: 25
End: 2025-05-12

## 2025-05-28 ENCOUNTER — TELEPHONE (OUTPATIENT)
Dept: FAMILY MEDICINE | Facility: CLINIC | Age: 25
End: 2025-05-28

## 2025-05-28 ENCOUNTER — OFFICE VISIT (OUTPATIENT)
Dept: FAMILY MEDICINE | Facility: CLINIC | Age: 25
End: 2025-05-28
Payer: COMMERCIAL

## 2025-05-28 VITALS
OXYGEN SATURATION: 98 % | BODY MASS INDEX: 23.22 KG/M2 | SYSTOLIC BLOOD PRESSURE: 116 MMHG | HEIGHT: 61 IN | TEMPERATURE: 97.9 F | RESPIRATION RATE: 16 BRPM | WEIGHT: 123 LBS | HEART RATE: 77 BPM | DIASTOLIC BLOOD PRESSURE: 70 MMHG

## 2025-05-28 DIAGNOSIS — F33.42 RECURRENT MAJOR DEPRESSIVE DISORDER, IN FULL REMISSION: ICD-10-CM

## 2025-05-28 DIAGNOSIS — R30.0 DYSURIA: Primary | ICD-10-CM

## 2025-05-28 PROBLEM — F39 EPISODIC MOOD DISORDER: Status: RESOLVED | Noted: 2022-09-13 | Resolved: 2025-05-28

## 2025-05-28 PROBLEM — Z34.90 SUPERVISION OF NORMAL PREGNANCY: Status: RESOLVED | Noted: 2025-04-16 | Resolved: 2025-05-28

## 2025-05-28 PROBLEM — D62 ANEMIA DUE TO BLOOD LOSS, ACUTE: Status: RESOLVED | Noted: 2025-04-29 | Resolved: 2025-05-28

## 2025-05-28 LAB
ALBUMIN UR-MCNC: NEGATIVE MG/DL
APPEARANCE UR: ABNORMAL
BACTERIA #/AREA URNS HPF: ABNORMAL /HPF
BILIRUB UR QL STRIP: NEGATIVE
COLOR UR AUTO: YELLOW
GLUCOSE UR STRIP-MCNC: NEGATIVE MG/DL
HGB UR QL STRIP: NEGATIVE
KETONES UR STRIP-MCNC: NEGATIVE MG/DL
LEUKOCYTE ESTERASE UR QL STRIP: ABNORMAL
MUCOUS THREADS #/AREA URNS LPF: PRESENT /LPF
NITRATE UR QL: NEGATIVE
PH UR STRIP: 7 [PH] (ref 5–8)
RBC #/AREA URNS AUTO: ABNORMAL /HPF
SP GR UR STRIP: 1.02 (ref 1–1.03)
SQUAMOUS #/AREA URNS AUTO: ABNORMAL /LPF
UROBILINOGEN UR STRIP-ACNC: 0.2 E.U./DL
WBC #/AREA URNS AUTO: ABNORMAL /HPF

## 2025-05-28 PROCEDURE — 81001 URINALYSIS AUTO W/SCOPE: CPT | Performed by: FAMILY MEDICINE

## 2025-05-28 PROCEDURE — 87086 URINE CULTURE/COLONY COUNT: CPT | Performed by: FAMILY MEDICINE

## 2025-05-28 RX ORDER — SULFAMETHOXAZOLE AND TRIMETHOPRIM 800; 160 MG/1; MG/1
1 TABLET ORAL 2 TIMES DAILY
Qty: 6 TABLET | Refills: 0 | Status: SHIPPED | OUTPATIENT
Start: 2025-05-28 | End: 2025-05-31

## 2025-05-28 SDOH — HEALTH STABILITY: PHYSICAL HEALTH: ON AVERAGE, HOW MANY MINUTES DO YOU ENGAGE IN EXERCISE AT THIS LEVEL?: 40 MIN

## 2025-05-28 SDOH — HEALTH STABILITY: PHYSICAL HEALTH: ON AVERAGE, HOW MANY DAYS PER WEEK DO YOU ENGAGE IN MODERATE TO STRENUOUS EXERCISE (LIKE A BRISK WALK)?: 5 DAYS

## 2025-05-28 ASSESSMENT — SOCIAL DETERMINANTS OF HEALTH (SDOH): HOW OFTEN DO YOU GET TOGETHER WITH FRIENDS OR RELATIVES?: TWICE A WEEK

## 2025-05-28 NOTE — PROGRESS NOTES
Prior to immunization administration, verified patients identity using patient s name and date of birth. Please see Immunization Activity for additional information.     Screening Questionnaire for Adult Immunization    Are you sick today?   No   Do you have allergies to medications, food, a vaccine component or latex?   Yes   Have you ever had a serious reaction after receiving a vaccination?   No   Do you have a long-term health problem with heart, lung, kidney, or metabolic disease (e.g., diabetes), asthma, a blood disorder, no spleen, complement component deficiency, a cochlear implant, or a spinal fluid leak?  Are you on long-term aspirin therapy?   No   Do you have cancer, leukemia, HIV/AIDS, or any other immune system problem?   No   Do you have a parent, brother, or sister with an immune system problem?   No   In the past 3 months, have you taken medications that affect  your immune system, such as prednisone, other steroids, or anticancer drugs; drugs for the treatment of rheumatoid arthritis, Crohn s disease, or psoriasis; or have you had radiation treatments?   No   Have you had a seizure, or a brain or other nervous system problem?   No   During the past year, have you received a transfusion of blood or blood    products, or been given immune (gamma) globulin or antiviral drug?   No   For women: Are you pregnant or is there a chance you could become       pregnant during the next month?   No   Have you received any vaccinations in the past 4 weeks?   No     Immunization questionnaire was positive for at least one answer.  Notified provider.      Patient instructed to remain in clinic for 15 minutes afterwards, and to report any adverse reactions.     Screening performed by Isabel Quinn MA on 5/28/2025 at 11:03 AM.

## 2025-05-28 NOTE — PROGRESS NOTES
Pt left before provider could see her.   Arrived at 10:20 for 10:40 visit and provider got to room around 11:30 due to overbooked schedule.      Provider called patient after visit as outlined below     Preventive Care Visit  [facility]  Lorie Lee MD, Family Medicine  May 28, 2025  {Provider  Link to SmartSet :[id number]}    Assessment & Plan     Dysuria  ***  - UA with Microscopic reflex to Culture - Clinic Collect  - UA Microscopic with Reflex to Culture  - Urine Culture  - sulfamethoxazole-trimethoprim (BACTRIM DS) 800-160 MG tablet  Dispense: 6 tablet; Refill: 0    Routine general medical examination at a health care facility  ***    Routine postpartum follow-up  ***  - Primary Care - Care Coordination Referral    Recurrent major depressive disorder, in full remission  ***    Difficulty of mother performing breastfeeding  ***  - Lactation  Referral      Patient has been advised of split billing requirements and indicates understanding: Yes        Counseling  Appropriate preventive services were addressed with this patient via screening, questionnaire, or discussion as appropriate for fall prevention, nutrition, physical activity, Tobacco-use cessation, social engagement, weight loss and cognition.  Checklist reviewing preventive services available has been given to the patient.  Reviewed patient's diet, addressing concerns and/or questions.   The patient was instructed to see the dentist every 6 months.   She is at risk for psychosocial distress and has been provided with information to reduce risk.       Follow-up    Follow-up Visit   Expected date: May 28, 2025      Follow Up Appointment Details:     Follow-up with whom?: Specify Provider    Provider Name: ZORAIDA COLLAZO    Follow-Up for what?: Other (Office Visit)    Additional Details: Nexplanon    How?: In Person             Follow-up Visit   Expected date:  May 28, 2026 (Approximate)      Follow Up Appointment  Details:     Follow-up with whom?: PCP    Follow-Up for what?: Adult Preventive    How?: In Person                 Subjective   Kevin is a 24 year old, presenting for the following:  Physical (Possible uti ) and Post Partum Exam        5/28/2025    10:59 AM   Additional Questions   Roomed by hser   Accompanied by self          HPI  Postpartum Visit  Patient is here for a postpartum visit. Delivery was 4/28/25. I have fully reviewed the prenatal and intrapartum course. The delivery was at 38 2/7 gestational weeks. Outcome: spontaneous vaginal delivery. Anesthesia: None.      Postpartum course has been healthy.  Baby's course has been healthy. Baby is feeding by breast. Postpartum bleeding for 2-3 weeks. Period yet no.  Bowel function is normal. Bladder function is as below . Patient is sexually active.   Contraception method considering is Nexplanon. Postpartum depression screening: neg. .  Total Weight Gain during pregnancy 17.3 kg (38 lb 4 oz) and Weight loss so far 21lb.     EPDS Score: 0  PHQ-9 score:        2/24/2025     2:23 PM   PHQ   PHQ-9 Total Score 1   Q9: Thoughts of better off dead/self-harm past 2 weeks Not at all     Declines vaccines     Some pain with urination and redness and bumps in this area.    Did leave a urine sample.   Last UTI was during pregnancy- this feels different.  Did try some Azo to try today after leaving sample.  Just started in past 1-2 days       Lactation consult needed       Discussed return to work/school plans.  Postpartum depression assessment and coping techniques. Fatigue and sleep strategies.  Breastfeeding and pumping as needed. Body changes and exercise/weight loss techniques.  Timing of next pregnancy and birth control prescribed as discussed.            Advance Care Planning  {The storyboard will display whether the patient has ACP docs on file. Hover over the Code section in the storyboard to access the ACP documents. :[id number]}  Discussed advance care planning  with patient; informed AVS has link to [facility].        5/28/2025   General Health   How would you rate your overall physical health? Good   Feel stress (tense, anxious, or unable to sleep) To some extent   (!) STRESS CONCERN      5/28/2025   Nutrition   Three or more servings of calcium each day? Yes   Diet: Regular (no restrictions)   How many servings of fruit and vegetables per day? (!) 2-3   How many sweetened beverages each day? 0-1         5/28/2025   Exercise   Days per week of moderate/strenous exercise 5 days   Average minutes spent exercising at this level 40 min         5/28/2025   Social Factors   Frequency of gathering with friends or relatives Twice a week   Worry food won't last until get money to buy more No   Food not last or not have enough money for food? No   Do you have housing? (Housing is defined as stable permanent housing and does not include staying outside in a car, in a tent, in an abandoned building, in an overnight shelter, or couch-surfing.) Yes   Are you worried about losing your housing? No   Lack of transportation? No   Unable to get utilities (heat,electricity)? No         5/28/2025   Dental   Dentist two times every year? (!) NO       { Rooming Staff Patient needs a PHQ as part of the AWV.  Use this link to complete and then refresh the note to pull results Link to PHQ9 Assessment :[id number]}  Today's PHQ-9 Score:       2/24/2025     2:23 PM   PHQ-9 SCORE   PHQ-9 Total Score 1           5/28/2025   Substance Use   Alcohol more than 3/day or more than 7/wk No   Do you use any other substances recreationally? No     Social History     Tobacco Use    Smoking status: Never     Passive exposure: Never    Smokeless tobacco: Never   Vaping Use    Vaping status: Never Used   Substance Use Topics    Alcohol use: Not Currently    Drug use: Not Currently     Comment: marijuana when not pregnant     {Provider  If there are gaps in the social history shown above, please follow the link  "to update and then refresh the note Link to Social and Substance History :[id number]}      5/28/2025   STI Screening   New sexual partner(s) since last STI/HIV test? No     History of abnormal Pap smear: No - age 21-29 PAP every 3 years recommended             5/28/2025   Contraception/Family Planning   Questions about contraception or family planning No     {Provider  REQUIRED FOR AWV Use the storyboard to review patient history, after sections have been marked as reviewed, refresh note to capture documentation:[id number]}   Reviewed and updated as needed this visit by Provider   Tobacco  Allergies  Meds  Problems  Med Hx  Surg Hx  Fam Hx                     Objective    Exam  /70 (BP Location: Left arm, Patient Position: Sitting, Cuff Size: Adult Regular)   Pulse 77   Temp 97.9  F (36.6  C) (Temporal)   Resp 16   Ht 1.549 m (5' 1\")   Wt 55.8 kg (123 lb)   LMP 07/28/2024 (Exact Date)   SpO2 98%   Breastfeeding Yes   BMI 23.24 kg/m     Estimated body mass index is 23.24 kg/m  as calculated from the following:    Height as of this encounter: 1.549 m (5' 1\").    Weight as of this encounter: 55.8 kg (123 lb).    Physical Exam  Complete 10 point ROS completed today as part of the exam and patient denies any symptoms as reviewed in HPI     Wt Readings from Last 3 Encounters:   05/28/25 55.8 kg (123 lb)   04/30/25 58.9 kg (129 lb 12.8 oz)   04/28/25 65.3 kg (144 lb)       Patient's last menstrual period was 07/28/2024 (exact date).    All normal as below except abnormalities include: phone visit after pt presented to clinic     Lorie Lee MD         Signed Electronically by: Lorie Lee MD  {Email feedback regarding this note to [email or web address]   :[id number]}  "

## 2025-05-28 NOTE — PATIENT INSTRUCTIONS
Patient Education   Preventive Care Advice   This is general advice given by our system to help you stay healthy. However, your care team may have specific advice just for you. Please talk to your care team about your preventive care needs.  Nutrition  Eat 5 or more servings of fruits and vegetables each day.  Try wheat bread, brown rice and whole grain pasta (instead of white bread, rice, and pasta).  Get enough calcium and vitamin D. Check the label on foods and aim for 100% of the RDA (recommended daily allowance).  Lifestyle  Exercise at least 150 minutes each week  (30 minutes a day, 5 days a week).  Do muscle strengthening activities 2 days a week. These help control your weight and prevent disease.  No smoking.  Wear sunscreen to prevent skin cancer.  Have a dental exam and cleaning every 6 months.  Yearly exams  See your health care team every year to talk about:  Any changes in your health.  Any medicines your care team has prescribed.  Preventive care, family planning, and ways to prevent chronic diseases.  Shots (vaccines)   HPV shots (up to age 26), if you've never had them before.  Hepatitis B shots (up to age 59), if you've never had them before.  COVID-19 shot: Get this shot when it's due.  Flu shot: Get a flu shot every year.  Tetanus shot: Get a tetanus shot every 10 years.  Pneumococcal, hepatitis A, and RSV shots: Ask your care team if you need these based on your risk.  Shingles shot (for age 50 and up)  General health tests  Diabetes screening:  Starting at age 35, Get screened for diabetes at least every 3 years.  If you are younger than age 35, ask your care team if you should be screened for diabetes.  Cholesterol test: At age 39, start having a cholesterol test every 5 years, or more often if advised.  Bone density scan (DEXA): At age 50, ask your care team if you should have this scan for osteoporosis (brittle bones).  Hepatitis C: Get tested at least once in your life.  STIs (sexually  transmitted infections)  Before age 24: Ask your care team if you should be screened for STIs.  After age 24: Get screened for STIs if you're at risk. You are at risk for STIs (including HIV) if:  You are sexually active with more than one person.  You don't use condoms every time.  You or a partner was diagnosed with a sexually transmitted infection.  If you are at risk for HIV, ask about PrEP medicine to prevent HIV.  Get tested for HIV at least once in your life, whether you are at risk for HIV or not.  Cancer screening tests  Cervical cancer screening: If you have a cervix, begin getting regular cervical cancer screening tests starting at age 21.  Breast cancer scan (mammogram): If you've ever had breasts, begin having regular mammograms starting at age 40. This is a scan to check for breast cancer.  Colon cancer screening: It is important to start screening for colon cancer at age 45.  Have a colonoscopy test every 10 years (or more often if you're at risk) Or, ask your provider about stool tests like a FIT test every year or Cologuard test every 3 years.  To learn more about your testing options, visit:   .  For help making a decision, visit:   https://bit.ly/za81931.  Prostate cancer screening test: If you have a prostate, ask your care team if a prostate cancer screening test (PSA) at age 55 is right for you.  Lung cancer screening: If you are a current or former smoker ages 50 to 80, ask your care team if ongoing lung cancer screenings are right for you.  For informational purposes only. Not to replace the advice of your health care provider. Copyright   2023 [facility]. All rights reserved. Clinically reviewed by the [facility]. Yatedo [id number] - REV 01/24.  Learning About Stress  What is stress?     Stress is your body's response to a hard situation. Your body can have a physical, emotional, or mental response. Stress is a fact of life for most people, and it  affects everyone differently. What causes stress for you may not be stressful for someone else.  A lot of things can cause stress. You may feel stress when you go on a job interview, take a test, or run a race. This kind of short-term stress is normal and even useful. It can help you if you need to work hard or react quickly. For example, stress can help you finish an important job on time.  Long-term stress is caused by ongoing stressful situations or events. Examples of long-term stress include long-term health problems, ongoing problems at work, or conflicts in your family. Long-term stress can harm your health.  How does stress affect your health?  When you are stressed, your body responds as though you are in danger. It makes hormones that speed up your heart, make you breathe faster, and give you a burst of energy. This is called the fight-or-flight stress response. If the stress is over quickly, your body goes back to normal and no harm is done.  But if stress happens too often or lasts too long, it can have bad effects. Long-term stress can make you more likely to get sick, and it can make symptoms of some diseases worse. If you tense up when you are stressed, you may develop neck, shoulder, or low back pain. Stress is linked to high blood pressure and heart disease.  Stress also harms your emotional health. It can make you suero, tense, or depressed. Your relationships may suffer, and you may not do well at work or school.  What can you do to manage stress?  You can try these things to help manage stress:   Do something active. Exercise or activity can help reduce stress. Walking is a great way to get started. Even everyday activities such as housecleaning or yard work can help.  Try yoga or kathy chi. These techniques combine exercise and meditation. You may need some training at first to learn them.  Do something you enjoy. For example, listen to music or go to a movie. Practice your hobby or do volunteer  "work.  Meditate. This can help you relax, because you are not worrying about what happened before or what may happen in the future.  Do guided imagery. Imagine yourself in any setting that helps you feel calm. You can use online videos, books, or a teacher to guide you.  Do breathing exercises. For example:  From a standing position, bend forward from the waist with your knees slightly bent. Let your arms dangle close to the floor.  Breathe in slowly and deeply as you return to a standing position. Roll up slowly and lift your head last.  Hold your breath for just a few seconds in the standing position.  Breathe out slowly and bend forward from the waist.  Let your feelings out. Talk, laugh, cry, and express anger when you need to. Talking with supportive friends or family, a counselor, or a gianfranco leader about your feelings is a healthy way to relieve stress. Avoid discussing your feelings with people who make you feel worse.  Write. It may help to write about things that are bothering you. This helps you find out how much stress you feel and what is causing it. When you know this, you can find better ways to cope.  What can you do to prevent stress?  You might try some of these things to help prevent stress:  Manage your time. This helps you find time to do the things you want and need to do.  Get enough sleep. Your body recovers from the stresses of the day while you are sleeping.  Get support. Your family, friends, and community can make a difference in how you experience stress.  Limit your news feed. Avoid or limit time on social media or news that may make you feel stressed.  Do something active. Exercise or activity can help reduce stress. Walking is a great way to get started.  Where can you learn more?  Go to https://www.[employer].net/patiented  Enter N032 in the search box to learn more about \"Learning About Stress.\"  Current as of: October 24, 2024  Content Version: 14.4 2024-2025 [employer] " [employer].   Care instructions adapted under license by your healthcare professional. If you have questions about a medical condition or this instruction, always ask your healthcare professional. [employer] disclaims any warranty or liability for your use of this information.

## 2025-05-28 NOTE — TELEPHONE ENCOUNTER
----- Message from Lorie Lee sent at 5/28/2025  3:32 PM CDT -----  Please help pt schedule lactation visit in next week  Please help pt schedule nexplanon with Tammy in next 1-2 weeks

## 2025-05-29 ENCOUNTER — PATIENT OUTREACH (OUTPATIENT)
Dept: CARE COORDINATION | Facility: CLINIC | Age: 25
End: 2025-05-29
Payer: COMMERCIAL

## 2025-05-29 LAB — BACTERIA UR CULT: NORMAL

## 2025-05-29 NOTE — TELEPHONE ENCOUNTER
Future Appointments 5/29/2025 - 11/25/2025        Date Visit Type Length Department Provider     6/17/2025 11:00 AM NEXPLANON PLACEMENT 30 min [facility] Tammy Valadez PA-C     6/17/2025 11:00 AM NEXPLANON PLACEMENT 40 min [facility]    Location Instructions:     [facility] is located at [address] [place] [address] in [place],at the intersection of [facility].This is one block south of the [facility].Free parking is available in the lot directly north of the clinic across [facility].The clinic is near stops along bus [address] and [address].  Due to ongoing road construction in the area around the [facility], travel times to this location may be longer than usual.Please plan for extra travel time and check the [place] Department of Transportation website for delay, closure,and detour information on the various road construction projects in the area.                   Lactation appointment for baby is 6/3/2025 at 8am. Completing task.

## 2025-05-30 ENCOUNTER — RESULTS FOLLOW-UP (OUTPATIENT)
Dept: FAMILY MEDICINE | Facility: CLINIC | Age: 25
End: 2025-05-30
Payer: COMMERCIAL

## 2025-05-30 NOTE — RESULT ENCOUNTER NOTE
Team - please call patient with results and send result letter with this information if patient desires for their records. Refer to [facility] result note as needed.      Her urine test was okay- is she feeling better with the antibiotics next week?  If not please get her on my schedule in next 1-2 weeks to do a pelvic exam

## 2025-06-01 ENCOUNTER — OFFICE VISIT (OUTPATIENT)
Dept: URGENT CARE | Facility: URGENT CARE | Age: 25
End: 2025-06-01
Payer: COMMERCIAL

## 2025-06-01 VITALS
TEMPERATURE: 97.1 F | SYSTOLIC BLOOD PRESSURE: 115 MMHG | OXYGEN SATURATION: 96 % | DIASTOLIC BLOOD PRESSURE: 71 MMHG | RESPIRATION RATE: 18 BRPM | BODY MASS INDEX: 22.47 KG/M2 | WEIGHT: 118.9 LBS | HEART RATE: 94 BPM

## 2025-06-01 DIAGNOSIS — R82.90 ABNORMAL FINDING ON URINALYSIS: ICD-10-CM

## 2025-06-01 DIAGNOSIS — B37.31 YEAST VAGINITIS: Primary | ICD-10-CM

## 2025-06-01 LAB
ALBUMIN SERPL-MCNC: 4.1 G/DL (ref 3.4–5)
ALBUMIN UR-MCNC: 30 MG/DL
ALP SERPL-CCNC: 57 U/L (ref 40–150)
ALT SERPL W P-5'-P-CCNC: 20 U/L (ref 0–50)
ANION GAP SERPL CALCULATED.3IONS-SCNC: 11 MMOL/L (ref 3–14)
APPEARANCE UR: CLEAR
AST SERPL W P-5'-P-CCNC: 41 U/L (ref 0–45)
BACTERIA #/AREA URNS HPF: ABNORMAL /HPF
BILIRUB SERPL-MCNC: 0.6 MG/DL (ref 0.2–1.3)
BILIRUB UR QL STRIP: ABNORMAL
BUN SERPL-MCNC: 10 MG/DL (ref 7–30)
CALCIUM SERPL-MCNC: 10.1 MG/DL (ref 8.5–10.1)
CHLORIDE BLD-SCNC: 107 MMOL/L (ref 94–109)
CLUE CELLS: ABNORMAL
CO2 SERPL-SCNC: 25 MMOL/L (ref 20–32)
COLOR UR AUTO: ABNORMAL
CREAT SERPL-MCNC: 1 MG/DL (ref 0.52–1.04)
EGFRCR SERPLBLD CKD-EPI 2021: 80 ML/MIN/1.73M2
GLUCOSE BLD-MCNC: 84 MG/DL (ref 70–99)
GLUCOSE UR STRIP-MCNC: NEGATIVE MG/DL
HGB UR QL STRIP: NEGATIVE
HYALINE CASTS #/AREA URNS LPF: ABNORMAL /LPF
KETONES UR STRIP-MCNC: ABNORMAL MG/DL
LEUKOCYTE ESTERASE UR QL STRIP: ABNORMAL
MUCOUS THREADS #/AREA URNS LPF: PRESENT /LPF
NITRATE UR QL: NEGATIVE
PH UR STRIP: 5.5 [PH] (ref 5–7)
POTASSIUM BLD-SCNC: 4.9 MMOL/L (ref 3.4–5.3)
PROT SERPL-MCNC: 7.6 G/DL (ref 6.8–8.8)
RBC #/AREA URNS AUTO: ABNORMAL /HPF
RENAL EPI CELLS #/AREA URNS HPF: ABNORMAL /HPF
SODIUM SERPL-SCNC: 143 MMOL/L (ref 135–145)
SP GR UR STRIP: >=1.03 (ref 1–1.03)
TRICHOMONAS, WET PREP: ABNORMAL
UROBILINOGEN UR STRIP-ACNC: 0.2 E.U./DL
WBC #/AREA URNS AUTO: ABNORMAL /HPF
WBC'S/HIGH POWER FIELD, WET PREP: ABNORMAL
YEAST, WET PREP: PRESENT

## 2025-06-01 PROCEDURE — 80053 COMPREHEN METABOLIC PANEL: CPT | Performed by: PHYSICIAN ASSISTANT

## 2025-06-01 PROCEDURE — 81001 URINALYSIS AUTO W/SCOPE: CPT | Performed by: PHYSICIAN ASSISTANT

## 2025-06-01 PROCEDURE — 3074F SYST BP LT 130 MM HG: CPT | Performed by: PHYSICIAN ASSISTANT

## 2025-06-01 PROCEDURE — 3078F DIAST BP <80 MM HG: CPT | Performed by: PHYSICIAN ASSISTANT

## 2025-06-01 PROCEDURE — 36415 COLL VENOUS BLD VENIPUNCTURE: CPT | Performed by: PHYSICIAN ASSISTANT

## 2025-06-01 PROCEDURE — 87210 SMEAR WET MOUNT SALINE/INK: CPT | Performed by: PHYSICIAN ASSISTANT

## 2025-06-01 PROCEDURE — 99214 OFFICE O/P EST MOD 30 MIN: CPT | Performed by: PHYSICIAN ASSISTANT

## 2025-06-01 RX ORDER — FLUCONAZOLE 150 MG/1
150 TABLET ORAL ONCE
Qty: 1 TABLET | Refills: 0 | Status: SHIPPED | OUTPATIENT
Start: 2025-06-01 | End: 2025-06-01

## 2025-06-01 NOTE — PATIENT INSTRUCTIONS
I will call with the results of your blood test  Take the one dose of fluconazole  At your next OB appt in about 2 weeks, please have a repeat urine test to ensure this has cleared.

## 2025-06-02 ENCOUNTER — PATIENT OUTREACH (OUTPATIENT)
Dept: CARE COORDINATION | Facility: CLINIC | Age: 25
End: 2025-06-02

## 2025-06-02 NOTE — TELEPHONE ENCOUNTER
Called patient.  Patient completed 3-days of Bactrim on 5/31.  Presented to  6/1/25, dx yeast infection and took OTD diflucan yesterday.  Still having some vaginal irritation, somewhat improved with diflucan. Patient denies hematuria, swelling, fever, chills.   She has follow-up appointment scheduled with THEODORE Valadez PA-C 6/17/25 for nexplanon placement. Will call/follow-up sooner if symptoms persist/worsen >24 hours.    Therese Lucas RN  [facility]

## 2025-06-02 NOTE — PROGRESS NOTES
Assessment & Plan     1. Yeast vaginitis (Primary)  Will treat with one dose of diflucan. Take after nursing.   Discussed ways to aid in discomfort  Wearing cotton underwear and letting the area air out    - Wet prep - Clinic Collect  - Comprehensive metabolic panel (BMP + Alb, Alk Phos, ALT, AST, Total. Bili, TP); Future  - Comprehensive metabolic panel (BMP + Alb, Alk Phos, ALT, AST, Total. Bili, TP)  - fluconazole (DIFLUCAN) 150 MG tablet; Take 1 tablet (150 mg) by mouth once for 1 dose.  Dispense: 1 tablet; Refill: 0    2. Abnormal finding on urinalysis  She has 2-5 RBC on Ua (currently having minimal postpartum lochia)   She has a few renal tubular cells on UA - Renal function is normal on CMP today. No leg swelling noted. No abdominal or flank pain.  I will have her monitor her symptoms  She has 6 week post partum follow-up in 2 weeks, I would like her urine rechecked at this time. If having swelling, hematuria, fever, chills, severe symptoms etc, I would like her to follow-up right away  - UA Macroscopic with reflex to Microscopic and Culture - Clinic Collect  - UA Microscopic with Reflex to Culture    Diagnosis and treatment plan was reviewed with patient and/or family.   We went over any labs or imaging. Discussed worsening symptoms or little to no relief despite treatment plan to follow-up with PCP or return to clinic.  Patient verbalizes understanding. All questions were addressed and answered.     Brittny Machado PA-C  [facility]    CHIEF COMPLAINT:   Chief Complaint   Patient presents with    Dysuria     Burning around the vulva. Thinks that her is very acid- just finished meds from her OB for a UTI but still has sx, very wet discharge     Subjective     Kevin is a 24 year old female who presents to clinic today for evaluation of vaginal itching and burning. She was seen on Friday and treated for a UTI with bactrim, her urine culture was negative. Feels that now  her symptoms are more vaginal. She has had discharge. . Denies having fever, chills, flank pain, nausea, vomiting, hematuria or weakness.     Patient is 4 weeks post partum. Currently breastfeeding.     Past Medical History:   Diagnosis Date    ADHD (attention deficit hyperactivity disorder)     Anxiety     Asthma     Created by Conversion     Autism     Depression     Disruptive behavior disorder      (normal spontaneous vaginal delivery) 2025     No past surgical history on file.  Social History     Tobacco Use    Smoking status: Never     Passive exposure: Never    Smokeless tobacco: Never   Substance Use Topics    Alcohol use: Not Currently     Current Outpatient Medications   Medication Sig Dispense Refill    fluconazole (DIFLUCAN) 150 MG tablet Take 1 tablet (150 mg) by mouth once for 1 dose. 1 tablet 0    Prenatal MV-Min-Fe Fum-FA-DHA (PRENATAL MULTIVITAMIN PLUS DHA) 27-0.8-250 MG CAPS Take 1 tablet by mouth daily. 30 capsule 11    acetaminophen (TYLENOL) 325 MG tablet Take 2 tablets (650 mg) by mouth every 4 hours as needed for fever or other (second line or per patient preference for mild to moderate pain management). 30 tablet 0    budesonide-formoterol (SYMBICORT) 80-4.5 MCG/ACT Inhaler Inhale 2 puffs once daily plus 1-2 puffs as needed. May use up to 12 puffs per day. 20.4 g 11    doxylamine (UNISOM) 25 MG TABS tablet Take 1 tablet (25 mg) by mouth nightly as needed for sleep. 30 tablet 1    escitalopram (LEXAPRO) 5 MG tablet Take 1 tablet (5 mg) by mouth daily. 30 tablet 5    ibuprofen (ADVIL/MOTRIN) 800 MG tablet Take 1 tablet (800 mg) by mouth every 6 hours as needed for other (first line or per patient preference for mild to moderate pain management). 30 tablet 0    ondansetron (ZOFRAN ODT) 4 MG ODT tab Take 1 tablet (4 mg) by mouth every 8 hours as needed for nausea. 30 tablet 6    polyethylene glycol (MIRALAX) 17 GM/Dose powder Take 17 g by mouth daily. 510 g 0     No current  facility-administered medications for this visit.     Allergies   Allergen Reactions    Azithromycin Hives    Doxycycline Hives    Hemabate [Carboprost Tromethamine]      asthma       10 point ROS of systems were all negative except for pertinent positives noted in my HPI.      Exam:   /71 (BP Location: Right arm, Patient Position: Sitting, Cuff Size: Adult Regular)   Pulse 94   Temp 97.1  F (36.2  C) (Temporal)   Resp 18   Wt 53.9 kg (118 lb 14.4 oz)   LMP 06/28/2024 (Exact Date)   SpO2 96%   Breastfeeding Yes   BMI 22.47 kg/m    Constitutional: healthy, alert and no distress  ENT: MMM  Cardiovascular: RRR  Respiratory: CTA bilaterally, no rhonchi or rales  Gastrointestinal: soft and nontender  Back: No CVA tenderness B/L  Skin: no rashes      Results for orders placed or performed in visit on 06/01/25   UA Macroscopic with reflex to Microscopic and Culture - Clinic Collect     Status: Abnormal    Specimen: Urine, Clean Catch   Result Value Ref Range    Color Urine Teresa (A) Colorless, Straw, Light Yellow, Yellow    Appearance Urine Clear Clear    Glucose Urine Negative Negative mg/dL    Bilirubin Urine Small (A) Negative    Ketones Urine Trace (A) Negative mg/dL    Specific Gravity Urine >=1.030 1.003 - 1.035    Blood Urine Negative Negative    pH Urine 5.5 5.0 - 7.0    Protein Albumin Urine 30 (A) Negative mg/dL    Urobilinogen Urine 0.2 0.2, 1.0 E.U./dL    Nitrite Urine Negative Negative    Leukocyte Esterase Urine Trace (A) Negative   UA Microscopic with Reflex to Culture     Status: Abnormal   Result Value Ref Range    Bacteria Urine Few (A) None Seen /HPF    RBC Urine 2-5 (A) 0-2 /HPF /HPF    WBC Urine 0-5 0-5 /HPF /HPF    Renal Tubular Epithelials Urine Few (A) None Seen /HPF    Mucus Urine Present (A) None Seen /LPF    Hyaline Casts Urine 2-5 (A) None Seen /LPF    Narrative    Urine Culture not indicated   Comprehensive metabolic panel (BMP + Alb, Alk Phos, ALT, AST, Total. Bili, TP)      Status: Normal   Result Value Ref Range    Sodium 143 135 - 145 mmol/L    Potassium 4.9 3.4 - 5.3 mmol/L    Chloride 107 94 - 109 mmol/L    Carbon Dioxide (CO2) 25 20 - 32 mmol/L    Anion Gap 11 3 - 14 mmol/L    Urea Nitrogen 10 7 - 30 mg/dL    Creatinine 1.00 0.52 - 1.04 mg/dL    GFR Estimate 80 >60 mL/min/1.73m2    Calcium 10.1 8.5 - 10.1 mg/dL    Glucose 84 70 - 99 mg/dL    Alkaline Phosphatase 57 40 - 150 U/L    AST 41 0 - 45 U/L    ALT 20 0 - 50 U/L    Protein Total 7.6 6.8 - 8.8 g/dL    Albumin 4.1 3.4 - 5.0 g/dL    Bilirubin Total 0.6 0.2 - 1.3 mg/dL   Wet prep - Clinic Collect     Status: Abnormal    Specimen: Vagina; Swab   Result Value Ref Range    Trichomonas Absent Absent    Yeast Present (A) Absent    Clue Cells Absent Absent    WBCs/high power field 1+ (A) None

## 2025-06-04 ENCOUNTER — PATIENT OUTREACH (OUTPATIENT)
Dept: CARE COORDINATION | Facility: CLINIC | Age: 25
End: 2025-06-04
Payer: COMMERCIAL

## 2025-06-04 NOTE — PROGRESS NOTES
Clinic Care Coordination Contact  [facility]/Voicemail    Clinical Data: Care Coordinator Outreach    Outreach Documentation Number of Outreach Attempt   6/4/2025  11:26 AM 1       Left message on patient's voicemail with call back information and requested return call.      Plan: Care Coordinator reached out to pt to follow up on baby resources, as well as mental health since she gave birth in April. Left message asking pt to reach out to CCSW if she needs support in any way.      Aneta Panchal, THERESA, [facility]  Social Work Care Coordinator

## 2025-06-17 ENCOUNTER — OFFICE VISIT (OUTPATIENT)
Dept: FAMILY MEDICINE | Facility: CLINIC | Age: 25
End: 2025-06-17
Payer: COMMERCIAL

## 2025-06-17 VITALS
HEIGHT: 61 IN | DIASTOLIC BLOOD PRESSURE: 73 MMHG | RESPIRATION RATE: 18 BRPM | OXYGEN SATURATION: 100 % | BODY MASS INDEX: 21.52 KG/M2 | HEART RATE: 57 BPM | TEMPERATURE: 97.3 F | SYSTOLIC BLOOD PRESSURE: 111 MMHG | WEIGHT: 114 LBS

## 2025-06-17 DIAGNOSIS — Z30.017 INSERTION OF IMPLANTABLE SUBDERMAL CONTRACEPTIVE: ICD-10-CM

## 2025-06-17 DIAGNOSIS — Z30.017 NEXPLANON INSERTION: Primary | ICD-10-CM

## 2025-06-17 LAB — HCG UR QL: NEGATIVE

## 2025-06-17 PROCEDURE — 81025 URINE PREGNANCY TEST: CPT | Performed by: PHYSICIAN ASSISTANT

## 2025-06-17 PROCEDURE — 3078F DIAST BP <80 MM HG: CPT | Performed by: PHYSICIAN ASSISTANT

## 2025-06-17 PROCEDURE — 3074F SYST BP LT 130 MM HG: CPT | Performed by: PHYSICIAN ASSISTANT

## 2025-06-17 PROCEDURE — 11981 INSERTION DRUG DLVR IMPLANT: CPT | Performed by: PHYSICIAN ASSISTANT

## 2025-06-17 RX ORDER — LIDOCAINE HYDROCHLORIDE AND EPINEPHRINE 10; 10 MG/ML; UG/ML
2.5 INJECTION, SOLUTION INFILTRATION; PERINEURAL ONCE
Status: COMPLETED | OUTPATIENT
Start: 2025-06-17 | End: 2025-06-17

## 2025-06-17 RX ADMIN — LIDOCAINE HYDROCHLORIDE AND EPINEPHRINE 2.5 ML: 10; 10 INJECTION, SOLUTION INFILTRATION; PERINEURAL at 10:49

## 2025-06-17 NOTE — PROGRESS NOTES
Subjective:    Kevin Urena is a 24 year old female who presents for Nexplanon insertion.    She has had Nexplanon before.  She has had unprotected sex in the past 2 weeks.  Her pregnancy test is negative today.  We discussed there is a small chance she could be pregnant, but too soon for test to be positive.  I discussed with her that Nexplanon should not adversely affect early pregnancy.  She would like Nexplanon placed today, and then will take a home or clinic pregnancy test in 2 to 4 weeks if concerns.  All of her questions were answered.  She would like to proceed.    Patient Active Problem List   Diagnosis    Attention deficit hyperactivity disorder (ADHD)    Generalized anxiety disorder    Alcohol use disorder, moderate, in early remission (H)    Cognitive complaints    Recurrent major depressive disorder, in full remission    Mild intermittent asthma without complication    Chalazion of right upper eyelid       Current Outpatient Medications:     acetaminophen (TYLENOL) 325 MG tablet, Take 2 tablets (650 mg) by mouth every 4 hours as needed for fever or other (second line or per patient preference for mild to moderate pain management)., Disp: 30 tablet, Rfl: 0    budesonide-formoterol (SYMBICORT) 80-4.5 MCG/ACT Inhaler, Inhale 2 puffs once daily plus 1-2 puffs as needed. May use up to 12 puffs per day., Disp: 20.4 g, Rfl: 11    doxylamine (UNISOM) 25 MG TABS tablet, Take 1 tablet (25 mg) by mouth nightly as needed for sleep., Disp: 30 tablet, Rfl: 1    escitalopram (LEXAPRO) 5 MG tablet, Take 1 tablet (5 mg) by mouth daily., Disp: 30 tablet, Rfl: 5    etonogestrel (NEXPLANON) 68 MG IMPL, 1 each (68 mg) by Subdermal route See Admin Instructions., Disp: , Rfl:     ibuprofen (ADVIL/MOTRIN) 800 MG tablet, Take 1 tablet (800 mg) by mouth every 6 hours as needed for other (first line or per patient preference for mild to moderate pain management)., Disp: 30 tablet, Rfl: 0    ondansetron (ZOFRAN ODT) 4 MG  "ODT tab, Take 1 tablet (4 mg) by mouth every 8 hours as needed for nausea., Disp: 30 tablet, Rfl: 6    polyethylene glycol (MIRALAX) 17 GM/Dose powder, Take 17 g by mouth daily., Disp: 510 g, Rfl: 0    Prenatal MV-Min-Fe Fum-FA-DHA (PRENATAL MULTIVITAMIN PLUS DHA) 27-0.8-250 MG CAPS, Take 1 tablet by mouth daily., Disp: 30 capsule, Rfl: 11    Current Facility-Administered Medications:     etonogestrel (NEXPLANON) subdermal implant 68 mg, 1 each, Subdermal, See Admin Instructions,     lidocaine 1% with EPINEPHrine 1:100,000 injection 2.5 mL, 2.5 mL, Intradermal, Once,       Objective:   Allergies:  Azithromycin, Doxycycline, and Hemabate [carboprost tromethamine]    /73 (BP Location: Right arm, Patient Position: Sitting, Cuff Size: Adult Regular)   Pulse 57   Temp 97.3  F (36.3  C) (Temporal)   Resp 18   Ht 1.549 m (5' 1\")   Wt 51.7 kg (114 lb)   LMP 06/28/2024 (Exact Date)   SpO2 100%   Breastfeeding Yes   BMI 21.54 kg/m    Body mass index is 21.54 kg/m .    General: Alert and oriented x 3, in no apparent distress    Procedure:  Left upper inner arm was adequately anesthetized with 2.5 cc of lidocaine with Epi.  Then, using sterile technique, Nexplanon was inserted and fabrice was deployed without difficulty.  Nexplanon fabrice was palpable subcutaneously by myself.  Insertion site was covered with a band-aid and area was wrapped with a pressure bandage.  Patient was neurovascularly intact after exam.  Appropriate wound aftercare was dicussed with patient.      Recent Results (from the past 24 hours)   HCG qualitative urine    Collection Time: 06/17/25 10:47 AM   Result Value Ref Range    hCG Urine Qualitative Negative Negative            Assessment and Plan:   1. Nexplanon insertion (Primary)  Insertion Date: 06/17/25  3 Year Expiration Date: 06/17/28  Consent form was reviewed with patient, signed, and will be scanned in to her chart.  She knows to use back-up birth control for the next 1 week.   - HCG " qualitative urine; Future  - lidocaine 1% with EPINEPHrine 1:100,000 injection 2.5 mL  - HCG qualitative urine  - etonogestrel (NEXPLANON) subdermal implant 68 mg  - INSERTION NON-BIODEGRADABLE DRUG DELIVERY IMPLANT        This dictation uses voice recognition software, which may contain typographical errors.

## 2025-06-17 NOTE — PROGRESS NOTES
Prior to immunization administration, verified patients identity using patient s name and date of birth. Please see Immunization Activity for additional information.     Screening Questionnaire for Adult Immunization    Are you sick today?   No   Do you have allergies to medications, food, a vaccine component or latex?   Yes   Have you ever had a serious reaction after receiving a vaccination?   No   Do you have a long-term health problem with heart, lung, kidney, or metabolic disease (e.g., diabetes), asthma, a blood disorder, no spleen, complement component deficiency, a cochlear implant, or a spinal fluid leak?  Are you on long-term aspirin therapy?   No   Do you have cancer, leukemia, HIV/AIDS, or any other immune system problem?   No   Do you have a parent, brother, or sister with an immune system problem?   No   In the past 3 months, have you taken medications that affect  your immune system, such as prednisone, other steroids, or anticancer drugs; drugs for the treatment of rheumatoid arthritis, Crohn s disease, or psoriasis; or have you had radiation treatments?   No   Have you had a seizure, or a brain or other nervous system problem?   No   During the past year, have you received a transfusion of blood or blood    products, or been given immune (gamma) globulin or antiviral drug?   No   For women: Are you pregnant or is there a chance you could become       pregnant during the next month?   No   Have you received any vaccinations in the past 4 weeks?   No     Immunization questionnaire was positive for at least one answer.  Notified provider.      Patient instructed to remain in clinic for 15 minutes afterwards, and to report any adverse reactions.     Screening performed by Isabel Quinn MA on 6/17/2025 at 10:34 AM.

## 2025-07-13 ENCOUNTER — HEALTH MAINTENANCE LETTER (OUTPATIENT)
Age: 25
End: 2025-07-13

## 2025-07-16 ENCOUNTER — PATIENT OUTREACH (OUTPATIENT)
Dept: CARE COORDINATION | Facility: CLINIC | Age: 25
End: 2025-07-16
Payer: COMMERCIAL

## 2025-07-16 NOTE — LETTER
[facility] CARE COORDINATION  [facility]  July 16, 2025    Kevin Urena  [address] [place] [address]    Dear Kevin,  Your Care Team congratulates you on your journey to maintain wellness. This document will help guide you on your journey to maintain a healthy lifestyle.  You can use this to help you overcome any barriers you may encounter.  If you should have any questions or concerns, you can contact the members of your Care Team or contact your Primary Care Clinic for assistance.     Patient feedback is important to us and helps us continue to improve the way we care for patients as well as celebrate the things we do well. You may receive a short survey from [facility] [employer] on behalf of the care coordination team. We would appreciate hearing from you!    Health Maintenance  Health Maintenance Reviewed:      My Access Plan  Medical Emergency 911   Primary Clinic Line [facility] - [phone]   24 Hour Appointment Line [phone] or  [phone]) (toll-free)   24 Hour Nurse Line [phone] (toll-free)   Preferred Urgent Care     Preferred Hospital     Preferred Pharmacy [facility] - [place], [place] - [address] [place] [address]     Behavioral Health Crisis Line The National Suicide Prevention Lifeline at 1-749.227.9696 or 911     My Care Team Members  Patient Care Team         Relationship Specialty Notifications Start End    Rafia Hess MD PCP - General Family Medicine  1/13/25     Phone: [phone] Fax: [phone]         [address] [place] [address], [address] [facility] [address]    Mindi Armstrong, PharmD Pharmacist Pharmacist  7/11/23     Phone: [phone] Fax: [phone]         [address] [place] [address]    Luisa Flores, PhD Assigned Behavioral Health Provider   6/23/24     Phone: [phone] Fax: [phone]         [facility] [address]    Rafia Hess MD Family  Medicine Physician Family Medicine  9/11/24     Phone: [phone] Fax: [phone]         [facility] [address], [address] [facility] [address]    Dagoberto Tena CHW Community Health Worker Primary Care - CC Admissions 10/16/24 7/16/25    Phone: [phone] Fax: [phone]         [address] [place], [place] [address]an [facility] [address] [facility] [address]    Aneta Panchal LGSW Lead Care Coordinator  Admissions 10/17/24 7/16/25    Rafia Hess MD Assigned PCP   3/23/25     Phone: [phone] Fax: [phone]         [facility] [address], [address] [facility] [address]                It has been your Clinic Care Team's pleasure to work with you on accomplishing your goals.    Regards,  Your Clinic Care Team

## 2025-07-16 NOTE — PROGRESS NOTES
Clinic Care Coordination Contact    Assessment: Care Coordinator contacted patient for 2 month follow up.  Patient has continued to follow the plan of care and assessment is negative for any new needs or concerns.    Enrollment status: Graduated.      Plan: No further outreaches at this time.  Patient will continue to follow the plan of care.  If new needs arise a new Care Coordination referral may be placed.  FYI to PCP    THERESA Adair, BLANCA  Social Work Care Coordinator

## 2025-07-23 ENCOUNTER — E-VISIT (OUTPATIENT)
Dept: FAMILY MEDICINE | Facility: CLINIC | Age: 25
End: 2025-07-23
Payer: COMMERCIAL

## 2025-07-23 DIAGNOSIS — F41.1 GENERALIZED ANXIETY DISORDER: ICD-10-CM

## 2025-07-23 DIAGNOSIS — F10.21 ALCOHOL USE DISORDER, MODERATE, IN EARLY REMISSION (H): ICD-10-CM

## 2025-07-23 DIAGNOSIS — F33.42 RECURRENT MAJOR DEPRESSIVE DISORDER, IN FULL REMISSION: ICD-10-CM

## 2025-07-23 DIAGNOSIS — F90.9 ATTENTION DEFICIT HYPERACTIVITY DISORDER (ADHD), UNSPECIFIED ADHD TYPE: Primary | ICD-10-CM

## 2025-07-23 PROCEDURE — 99207 PR NON-BILLABLE SERV PER CHARTING: CPT | Performed by: FAMILY MEDICINE

## 2025-07-23 NOTE — PATIENT INSTRUCTIONS
Thank you for choosing us for your care. I think an in-clinic or virtual visit would be the best next step based on your symptoms. Please schedule a clinic appointment; you won t be charged for this eVisit.      You can schedule an appointment by clicking here in [employer]Cloud, or call [phone].

## 2025-07-23 NOTE — TELEPHONE ENCOUNTER
Lactation referral placed 5/28- please help pt schedule   Mental health referral placed  Pt should see provider this week-    Provider E-Visit time total (minutes): Referred to in person/virtual visit.

## 2025-07-24 NOTE — TELEPHONE ENCOUNTER
Future Appointments 7/24/2025 - 1/20/2026        Date Visit Type Length Department Provider     7/25/2025  9:00 AM [facility]ULT [facility]OTHERAPY [facility], [place], [place]              8/1/2025 12:15 PM OFFICE VISIT 30 min [facility]/OB Benita Sarabia PA-C    Location Instructions:     [facility] is located at [address] in [place],at the intersection of [facility].This is one block south of the [facility].Free parking is available in the lot directly north of the clinic across [facility].The clinic is near stops along bus [address] and [address].  Due to ongoing road construction in the area around the [facility], travel times to this location may be longer than usual.Please plan for extra travel time and check the [facility] of Transportation website for delay, closure,and detour information on the various road construction projects in the area.              8/7/2025  1:00 PM MY[facility] PREVENTATIVE ADULT VISIT 30 min [facility] Sesar Pool MD    Location Instructions:     The elevator outage for [facility] is scheduled from July 14th through August 15th.  The elevator to our 2nd floor primary care clinic is out of service. If you have trouble using stairs, you may have your clinic visit on the 1st floor. You may need to move between rooms and waiting areas, so your visit may take longer than normal.    [facility] is located in [place], just east of the [facility] exit off of [address] and the [facility] [place]. Access the free parking lot directly from [facility] if traveling east; if traveling west on [place], turn south on [facility]. Bus [address] and the [employer] Line light rail also stop at the intersection of [place] and [facility].              8/8/2025  9:00 AM ADULT PSYCHOTHERAPY RETURN 60 min [facility] There[facility] Emerson, IRENA, MARGUERITE

## 2025-07-28 ENCOUNTER — PATIENT OUTREACH (OUTPATIENT)
Dept: CARE COORDINATION | Facility: CLINIC | Age: 25
End: 2025-07-28
Payer: COMMERCIAL

## 2025-07-28 ENCOUNTER — VIRTUAL VISIT (OUTPATIENT)
Dept: PSYCHOLOGY | Facility: CLINIC | Age: 25
End: 2025-07-28
Payer: COMMERCIAL

## 2025-07-28 DIAGNOSIS — R41.9 COGNITIVE COMPLAINTS: ICD-10-CM

## 2025-07-28 DIAGNOSIS — F41.1 GENERALIZED ANXIETY DISORDER: ICD-10-CM

## 2025-07-28 DIAGNOSIS — F10.21 ALCOHOL USE DISORDER, MODERATE, IN EARLY REMISSION (H): ICD-10-CM

## 2025-07-28 DIAGNOSIS — F90.9 ATTENTION DEFICIT HYPERACTIVITY DISORDER (ADHD), UNSPECIFIED ADHD TYPE: ICD-10-CM

## 2025-07-28 DIAGNOSIS — F33.42 RECURRENT MAJOR DEPRESSIVE DISORDER, IN FULL REMISSION: Primary | ICD-10-CM

## 2025-07-28 PROCEDURE — 90837 PSYTX W PT 60 MINUTES: CPT | Mod: 95

## 2025-07-28 ASSESSMENT — COLUMBIA-SUICIDE SEVERITY RATING SCALE - C-SSRS
2. HAVE YOU ACTUALLY HAD ANY THOUGHTS OF KILLING YOURSELF?: NO
TOTAL  NUMBER OF ABORTED OR SELF INTERRUPTED ATTEMPTS SINCE LAST CONTACT: NO
6. HAVE YOU EVER DONE ANYTHING, STARTED TO DO ANYTHING, OR PREPARED TO DO ANYTHING TO END YOUR LIFE?: NO
REASONS FOR IDEATION SINCE LAST CONTACT: DOES NOT APPLY
ATTEMPT SINCE LAST CONTACT: NO
TOTAL  NUMBER OF INTERRUPTED ATTEMPTS SINCE LAST CONTACT: NO
1. SINCE LAST CONTACT, HAVE YOU WISHED YOU WERE DEAD OR WISHED YOU COULD GO TO SLEEP AND NOT WAKE UP?: YES
SUICIDE, SINCE LAST CONTACT: NO

## 2025-07-28 NOTE — PROGRESS NOTES
[facility]   Mental Health & Addiction Services     Progress Note - Initial Visit    Patient  Name:  Kevin Urena Date: 25          Service Type: Individual     Visit Start Time: 700am  Visit End Time: 758am    Visit #:  2    Attendees: Client attended alone    Service Modality:  Video Visit:      Provider verified identity through the following two step process.  Patient provided:  Patient  and Patient address    Telemedicine Visit: The patient's condition can be safely assessed and treated via synchronous audio and visual telemedicine encounter.      Reason for Telemedicine Visit: Patient has requested telehealth visit    Originating Site (Patient Location): Patient's other mother's home    Distant Site (Provider Location): Provider Remote Setting- Home Office    Consent:  The patient/guardian has verbally consented to: the potential risks and benefits of telemedicine (video visit) versus in person care; bill my insurance or make self-payment for services provided; and responsibility for payment of non-covered services.     Patient would like the video invitation sent by:  My Chart    Mode of Communication:  Video Conference via Amwell    Distant Location (Provider):  Off-site    As the provider I attest to compliance with applicable laws and regulations related to telemedicine.       DATA:   Interactive Complexity: No   Crisis: No   Extended Session (53+ minutes):  - Patient's presenting concerns require more intensive intervention than could be completed within the usual service         Presenting Concerns/  Current Stressors:   Kevin  stated that she had received a call from social work however she was unavailable. Client stated she tried to return call and social work was unavailable. Client was recommended to reach out again. Client worked on DA however was unable to complete in time frame today. Client will complete next session. Client attended session with mother.  Client expressed that she gives consent for mother  to be in appointments to assist with understanding material and historical accounts. Client stated that she has difficulty with recall. Client reviewed Diagnosis with mother endorsing diagnosis of Autism ADHD Bipolar Anxiety depression and alcohol use disorder with rapid cycling and manic episodes with rapid pressured speech, impulsivity, hypersexuality, and excessive spending. Mother expressed that client was diagnosed with mood disorder in late grade school and bipolar disorder at age 14 years. Client stated that she does not believe that medications have been helpful and is not currently taking prescribed medications. Client expressed that THC is helpful with her anxiety. Client expressed that she believes that she may no longer be  on a wait list for [facility] as she has not been in contact last Monday as required to remain on the list. Client reports fleeting SI less than one hour per week. Client reports that baby's father is triggering and she sees him once weekly and that has been helpful.      ASSESSMENT:  Mental Status Assessment:  Appearance:   Appropriate   Eye Contact:   Fair   Psychomotor Behavior: Restless   Attitude:   Cooperative   Orientation:   Person Place Time Situation  Speech   Rate / Production: Talkative   Volume:  Normal   Mood:    Anxious   Affect:    Worrisome   Thought Content:  Referential Thinking   Thought Form:  Flight of Ideas   Insight:    Poor     Assessments completed prior to this visit:  The following assessments were completed by patient for this visit:  PHQ9:       2/1/2024    11:31 AM 6/6/2024     4:19 PM 12/9/2024    11:51 AM 1/13/2025    10:23 AM 2/3/2025     3:03 PM 2/24/2025     2:23 PM 7/25/2025     8:36 AM   PHQ-9 SCORE   PHQ-9 Total Score MyChart 13 (Moderate depression) 3 (Minimal depression) 7 (Mild depression)    13 (Moderate depression)   PHQ-9 Total Score 13 3 7  5 5 1 13        Patient-reported     GAD7:        6/19/2023    10:15 AM 7/3/2023    12:48 PM 11/3/2023     7:50 AM 1/3/2024    12:53 PM 2/1/2024    11:50 AM 6/6/2024     4:20 PM   GUCCI-7 SCORE   Total Score   15 (severe anxiety) 19 (severe anxiety) 13 (moderate anxiety) 15 (severe anxiety)   Total Score 4 2 15 19 13    13 15     CAGE-AID:       11/3/2023     7:54 AM   CAGE-AID Total Score   Total Score 4   Total Score MyChart 4 (A total score of 2 or greater is considered clinically significant)     PROMIS 10-Global Health (all questions and answers displayed):       11/3/2023     7:53 AM 11/15/2023     4:01 PM 2/1/2024    11:51 AM 6/6/2024     4:21 PM 7/25/2025     8:52 AM   PROMIS 10   In general, would you say your health is: Very good Excellent Good Excellent Good   In general, would you say your quality of life is: Very good Excellent Fair Fair Good   In general, how would you rate your physical health? Fair Excellent Fair Poor Good   In general, how would you rate your mental health, including your mood and your ability to think? Fair Excellent Poor Fair Poor   In general, how would you rate your satisfaction with your social activities and relationships? Fair Excellent Fair Very good Poor   In general, please rate how well you carry out your usual social activities and roles Fair Excellent Fair Very good Poor   To what extent are you able to carry out your everyday physical activities such as walking, climbing stairs, carrying groceries, or moving a chair? Completely Completely Mostly A little Completely   In the past 7 days, how often have you been bothered by emotional problems such as feeling anxious, depressed, or irritable? Always Never Always Always Often   In the past 7 days, how would you rate your fatigue on average? None None Mild Moderate Moderate   In the past 7 days, how would you rate your pain on average, where 0 means no pain, and 10 means worst imaginable pain? 5 0 7 0 3   In general, would you say your health is: 4  5 3 5 3   In  general, would you say your quality of life is: 4  5 2 2 3   In general, how would you rate your physical health? 2  5 2 1 3   In general, how would you rate your mental health, including your mood and your ability to think? 2  5 1 2 1   In general, how would you rate your satisfaction with your social activities and relationships? 2  5 2 4 1   In general, please rate how well you carry out your usual social activities and roles. (This includes activities at home, at work and in your community, and responsibilities as a parent, child, spouse, employee, friend, etc.) 2  5 2 4 1   To what extent are you able to carry out your everyday physical activities such as walking, climbing stairs, carrying groceries, or moving a chair? 5  5 4 2 5   In the past 7 days, how often have you been bothered by emotional problems such as feeling anxious, depressed, or irritable? 5  1 5 5 4   In the past 7 days, how would you rate your fatigue on average? 1  1 2 3 3   In the past 7 days, how would you rate your pain on average, where 0 means no pain, and 10 means worst imaginable pain? 5  0 7 0 3   Global Mental Health Score 9 20 6 9 7    Global Physical Health Score 15 20 12 11 15    PROMIS TOTAL - SUBSCORES 24 40 18 20 22        Patient-reported    Proxy-reported         Safety Issues and Plan for Safety and Risk Management:   [facility]agle Suicide Severity Rating Scale (Lifetime/Recent)      6/26/2023    10:21 AM 4/24/2025     1:48 PM 4/28/2025     6:22 PM 7/25/2025    10:48 AM   [facility]agle Suicide Severity Rating (Lifetime/Recent)   Q1 Wished to be Dead (Past Month)  0-->no 0-->no    Q2 Suicidal Thoughts (Past Month)  0-->no 0-->no    Q6 Suicide Behavior (Lifetime)  0-->no 0-->no    Level of Risk per Screen  no risks indicated  no risks indicated     1. Wish to be Dead (Lifetime) Y   Y   Wish to be Dead Description (Lifetime) Reports both passive and active SI beginning in 7th grade.   both passive and active SI starting in 7th grade   1.  "Wish to be Dead (Past 1 Month) Y   Y   Wish to be Dead Description (Past 1 Month) Reports passive SI when feeling overwhelmed with current stressors (legal case)   Reports passive SI fleeting when she is concerned that she does not produce enough milk. stated \"My adhd kicks in and I forget my thoughts.\" Reported SI when seeing child's father. Client reported seeing child's father less frequently. Reports once weekly less than an hour at a time, no plan or intent.   2. Non-Specific Active Suicidal Thoughts (Lifetime) Y   Y   Non-Specific Active Suicidal Thought Description (Lifetime) Beginning in 7th grade, reports hospitalization for SI in 2014   beginning 7th grade reports hospitilazation for SI in 2014, reports hospitalization for SA on 2020   2. Non-Specific Active Suicidal Thoughts (Past 1 Month) Y   Y   Non-Specific Active Suicidal Thought Description (Past 1 Month) Patient reports thoughts of killing herself without plan/intent when feeling very overwhelmed, sad about the possibility of being incarcerated for first month of baby's life.   thoughts of not being able to produce enough milk for baby, feelings of overwhelm interaction with child's father   3. Active Suicidal Ideation with any Methods (Not Plan) Without Intent to Act (Lifetime) Y   Y   Active Suicidal Ideation with any Methods (Not Plan) Description (Lifetime) Identified thoughts of cutting herself as method she has previously considered back in middle/high school   middle high school thoughts of cutting self as a method   3. Active Suicidal Ideation with any Methods (Not Plan) Without Intent to Act (Past 1 Month) Y   N   Active Suicidal Ideation with any Methods (Not Plan) Description (Past 1 Month) Identified walking into oncoming traffic as means if she were to attempt suicide      4. Active Suicidal Ideation with Some Intent to Act, Without Specific Plan (Lifetime) N   N   Active Suicidal Ideation with Some Intent to Act, Without Specific Plan " Description (Lifetime) Reports she does not recall having had intent leading up to prior attempt      4. Active Suicidal Ideation with Some Intent to Act, Without Specific Plan (Past 1 Month) N   N   5. Active Suicidal Ideation with Specific Plan and Intent (Lifetime) N   N   Active Suicidal Ideation with Specific Plan and Intent Description (Lifetime) Reports she does not recall having had a plan/intent leading up to prior attempt      5. Active Suicidal Ideation with Specific Plan and Intent (Past 1 Month) N   N   Most Severe Ideation Rating (Lifetime) 3   3   Most Severe Ideation Rating (Past 1 Month) 2   2   Description of Most Severe Ideation (Past 1 Month)    fleeting less than one hour non specific when overwhelmed wit thoughts that she is not producing milk or when interacting with child's father   Frequency (Lifetime) --   3   Frequency (Past 1 Month) 3   1   Duration (Lifetime) 1   1   Duration (Past 1 Month) 1   2   Controllability (Lifetime) --   3   Controllability (Past 1 Month) 3   2   Deterrents (Lifetime) 3   3   Deterrents (Past 1 Month) 1   0   Reasons for Ideation (Lifetime) 3   3   Reasons for Ideation (Past 1 Month) 5   4   Actual Attempt (Lifetime) Y   Y   Total Number of Actual Attempts (Lifetime) 1   1   Actual Attempt Description (Lifetime) Patient reported attempt by trying to jump in front of buses in August 2022 while intoxicated. EMS intervened. States she was blacked out and does not recall having made this plan or having suicidal intent leading up to this event. See note from 8/30/22.   While intoxicated patient reported trying to jump in front of bus EMS intervened does not recall having plan or intent leading to event. In a blackout no memory   Actual Attempt (Past 3 Months) N   N   Has subject engaged in non-suicidal self-injurious behavior? (Lifetime) Y   Y   Has subject engaged in non-suicidal self-injurious behavior? (Past 3 Months) N   N   Interrupted Attempts (Lifetime) Y   Y    Total Number of Interrupted Attempts (Lifetime) 1   1   Interrupted Attempt Description (Lifetime) Attempt in August 2022 interrupted by EMS   Attempt August 2022 by EMS   Interrupted Attempts (Past 3 Months) N   N   Aborted or Self-Interrupted Attempt (Lifetime) N   N   Preparatory Acts or Behavior (Lifetime) N      Most Recent Attempt Date 8/29/2022      Actual Lethality/Medical Damage Code (Most Recent Attempt) 0   0   Potential Lethality Code (Most Recent Attempt) --      Most Lethal Attempt Date 8/29/2022 8/29/2022   Actual Lethality/Medical Damage Code (Most Lethal Attempt) 0   0   Potential Lethality Code (Most Lethal Attempt) --      Initial/First Attempt Date 8/29/2022 8/29/2022   Actual Lethality/Medical Damage Code (Initial/First Attempt) 0   0   Potential Lethality Code (Initial/First Attempt) --      Calculated C-SSRS Risk Score (Lifetime/Recent) Moderate Risk   Moderate Risk       Data saved with a previous flowsheet row definition     Patient denies current fears or concerns for personal safety.  Patient reports the following current or recent suicidal ideation or behaviors: SI once weekly less than one hour.  Patient denies current or recent homicidal ideation or behaviors.  Patient denies current or recent self injurious behavior or ideation.  Patient denies other safety concerns.  A safety and risk management plan has been developed including: Patient consented to co-developed safety plan on 7/25/2025.  Safety and risk management plan was reviewed.   Patient agreed to use safety plan should any safety concerns arise.  A copy was made available to the patient.  Patient reports there are no firearms in the house.     Diagnostic Criteria:  Generalized Anxiety Disorder  A. Excessive anxiety and worry about a number of events or activities (such as work or school performance).   B. The person finds it difficult to control the worry.  C. Select 3 or more symptoms (required for diagnosis). Only one  item is required in children.   - Restlessness or feeling keyed up or on edge.    - Being easily fatigued.    - Difficulty concentrating or mind going blank.    - Irritability.    - Muscle tension.    - Sleep disturbance (difficulty falling or staying asleep, or restless unsatisfying sleep).   D. The focus of the anxiety and worry is not confined to features of an Axis I disorder.  E. The anxiety, worry, or physical symptoms cause clinically significant distress or impairment in social, occupational, or other important areas of functioning.   F. The disturbance is not due to the direct physiological effects of a substance (e.g., a drug of abuse, a medication) or a general medical condition (e.g., hyperthyroidism) and does not occur exclusively during a Mood Disorder, a Psychotic Disorder, or a Pervasive Developmental Disorder.    - The aformentioned symptoms began 10 years year(s) ago and occurs 7 days per week and is experienced as moderate.  Major Depressive Disorder  CRITERIA (A-C) REPRESENT A MAJOR DEPRESSIVE EPISODE - SELECT THESE CRITERIA  A) Recurrent episode(s) - symptoms have been present during the same 2-week period and represent a change from previous functioning 5 or more symptoms (required for diagnosis)   - Depressed mood. Note: In children and adolescents, can be irritable mood.     - Diminished interest or pleasure in all, or almost all, activities.    - Psychomotor activity agitation.    - Fatigue or loss of energy.    - Feelings of worthlessness or inappropriate guilt.    - Diminished ability to think or concentrate, or indecisiveness.    - Recurrent thoughts of death (not just fear of dying), recurrent suicidal ideation without a specific plan, or a suicide attempt or a specific plan for committing suicide.   B) The symptoms cause clinically significant distress or impairment in social, occupational, or other important areas of functioning  C) The episode is not attributable to the physiological  effects of a substance or to another medical condition  D) The occurence of major depressive episode is not better explained by other thought / psychotic disorders  E) There has never been a manic episode or hypomanic episode      DSM5 Diagnoses: (Sustained by DSM5 Criteria Listed Above)  Diagnoses: 296.32 (F33.1) Major Depressive Disorder, Recurrent Episode, Moderate _  300.02 (F41.1) Generalized Anxiety Disorder  Psychosocial & Contextual Factors: Client is currently homeless with a toddler and infant. Client has some social supports with children being in  during the day. Client's mom has been supportive. Client is on wait list for family shelter.  Intervention:   Completed Safety plan and Educated on treatment planning and started identifying goals and interventions for treatment plan  Collateral Reports Completed:  Not Applicable      PLAN: (Homework, other):  1. Provider will continue Diagnostic Assessment.  Patient was given the following to do until next session:  Gather psychological testing paperwork and bring to next appt with [facility] to be scanned into EPIC. Answer all phone calls from referrals placed. Continue to follow recommendations from probation. Continue to attend and follow all medical recommendations    2. Provider recommended the following referrals: Primary Health Care, Social Work, [facility] , Lactation specialist Psychological testing.  DBT and/or anger management group    3.  Suicide Risk and Safety Concerns were assessed for Kevin Urena.    Patient meets the following risk assessment and triage:   Moderate Risk is identified when the patient has one of the following:   Multiple risk factors and few protective factors  homeless, postpartum    The following has been recommended:  Complete/Review/Update Safety Plan    Safety Plan:  Jeaneth Safety Plan      Creation Date: 7/25/25 Last Update Date: 7/25/25      Step 1: Warning signs:    Warning Signs     Feeling overwhelmed, feeling that I am not producing enough milk. not having bottles filled for weekend . Seeing child's father.      Step 2: Internal coping strategies - Things I can do to take my mind off my problems without contacting another person:    Strategies    Have mom watch baby go for a walk take 10 minutes, being outside. Only see child's father once weekly has helped. Minimize contact.      Step 3: People and social settings that provide distraction:    Name Contact Information    Lucero Taylor [phone]       Places    car    Park      Step 4: People whom I can ask for help during a crisis:    Name Contact Information    Lucero Taylor [phone]      Step 5: Professionals or agencies I can contact during a crisis:    Clinician/Agency Name Phone Emergency Contact    [facility] crisis (urgent care) [phone]       [facility] Emergency Department Emergency Department Address Emergency Department Phone    [facility] Address: [address] [place] [id number] Phone: [phone]      Suicide Prevention Lifeline Phone: Call or Text 234  Crisis Text Line: Text HOME to 388019     Step 6: Making the environment safer (plan for lethal means safety):   Did not identify any lethal methods     Optional: What is most important to me and worth living for?:   Kids     Basilio-Brown Safety Plan. Carine Richmond and Vasiliy Leon. Used with permission of the authors.            Therese Emerson, [facility], LADC  July 28, 2025

## 2025-07-30 ENCOUNTER — PATIENT OUTREACH (OUTPATIENT)
Dept: CARE COORDINATION | Facility: CLINIC | Age: 25
End: 2025-07-30
Payer: COMMERCIAL

## 2025-08-01 ENCOUNTER — VIRTUAL VISIT (OUTPATIENT)
Dept: FAMILY MEDICINE | Facility: CLINIC | Age: 25
End: 2025-08-01
Payer: COMMERCIAL

## 2025-08-01 DIAGNOSIS — F41.1 GENERALIZED ANXIETY DISORDER: ICD-10-CM

## 2025-08-01 DIAGNOSIS — F84.0 AUTISM SPECTRUM DISORDER: ICD-10-CM

## 2025-08-01 DIAGNOSIS — Z59.819 HOUSING INSECURITY: ICD-10-CM

## 2025-08-01 DIAGNOSIS — F33.42 RECURRENT MAJOR DEPRESSIVE DISORDER, IN FULL REMISSION: Primary | ICD-10-CM

## 2025-08-01 PROCEDURE — 98006 SYNCH AUDIO-VIDEO EST MOD 30: CPT | Performed by: PHYSICIAN ASSISTANT

## 2025-08-01 RX ORDER — BUPROPION HYDROCHLORIDE 150 MG/1
150 TABLET ORAL EVERY MORNING
Qty: 30 TABLET | Refills: 1 | Status: SHIPPED | OUTPATIENT
Start: 2025-08-01

## 2025-08-01 RX ORDER — HYDROXYZINE HYDROCHLORIDE 25 MG/1
25-50 TABLET, FILM COATED ORAL 3 TIMES DAILY PRN
Qty: 30 TABLET | Refills: 0 | Status: SHIPPED | OUTPATIENT
Start: 2025-08-01

## 2025-08-01 SDOH — ECONOMIC STABILITY - HOUSING INSECURITY: HOUSING INSTABILITY UNSPECIFIED: Z59.819

## 2025-08-01 ASSESSMENT — ASTHMA QUESTIONNAIRES
ACT_TOTALSCORE: 25
QUESTION_2 LAST FOUR WEEKS HOW OFTEN HAVE YOU HAD SHORTNESS OF BREATH: NOT AT ALL
QUESTION_4 LAST FOUR WEEKS HOW OFTEN HAVE YOU USED YOUR RESCUE INHALER OR NEBULIZER MEDICATION (SUCH AS ALBUTEROL): NOT AT ALL
QUESTION_3 LAST FOUR WEEKS HOW OFTEN DID YOUR ASTHMA SYMPTOMS (WHEEZING, COUGHING, SHORTNESS OF BREATH, CHEST TIGHTNESS OR PAIN) WAKE YOU UP AT NIGHT OR EARLIER THAN USUAL IN THE MORNING: NOT AT ALL
QUESTION_1 LAST FOUR WEEKS HOW MUCH OF THE TIME DID YOUR ASTHMA KEEP YOU FROM GETTING AS MUCH DONE AT WORK, SCHOOL OR AT HOME: NONE OF THE TIME
QUESTION_5 LAST FOUR WEEKS HOW WOULD YOU RATE YOUR ASTHMA CONTROL: COMPLETELY CONTROLLED

## 2025-08-08 ENCOUNTER — VIRTUAL VISIT (OUTPATIENT)
Dept: PSYCHOLOGY | Facility: CLINIC | Age: 25
End: 2025-08-08
Attending: FAMILY MEDICINE
Payer: COMMERCIAL

## 2025-08-08 DIAGNOSIS — F90.9 ATTENTION DEFICIT HYPERACTIVITY DISORDER (ADHD), UNSPECIFIED ADHD TYPE: ICD-10-CM

## 2025-08-08 DIAGNOSIS — F91.3 OPPOSITIONAL DEFIANT DISORDER: ICD-10-CM

## 2025-08-08 DIAGNOSIS — F41.1 GENERALIZED ANXIETY DISORDER: ICD-10-CM

## 2025-08-08 DIAGNOSIS — F33.3: Primary | ICD-10-CM

## 2025-08-08 PROCEDURE — 90791 PSYCH DIAGNOSTIC EVALUATION: CPT | Mod: 95

## 2025-08-11 ENCOUNTER — TELEPHONE (OUTPATIENT)
Dept: PSYCHIATRY | Facility: CLINIC | Age: 25
End: 2025-08-11
Payer: COMMERCIAL

## 2025-08-12 ENCOUNTER — MYC REFILL (OUTPATIENT)
Dept: FAMILY MEDICINE | Facility: CLINIC | Age: 25
End: 2025-08-12
Payer: COMMERCIAL

## 2025-08-12 DIAGNOSIS — F33.42 RECURRENT MAJOR DEPRESSIVE DISORDER, IN FULL REMISSION: ICD-10-CM

## 2025-08-12 DIAGNOSIS — F41.1 GENERALIZED ANXIETY DISORDER: ICD-10-CM

## 2025-08-12 RX ORDER — HYDROXYZINE HYDROCHLORIDE 25 MG/1
TABLET, FILM COATED ORAL
Qty: 90 TABLET | Refills: 3 | OUTPATIENT
Start: 2025-08-12

## 2025-08-12 RX ORDER — HYDROXYZINE HYDROCHLORIDE 25 MG/1
25-50 TABLET, FILM COATED ORAL 3 TIMES DAILY PRN
Qty: 180 TABLET | Refills: 1 | Status: SHIPPED | OUTPATIENT
Start: 2025-08-12

## 2025-08-14 ENCOUNTER — VIRTUAL VISIT (OUTPATIENT)
Dept: PSYCHOLOGY | Facility: CLINIC | Age: 25
End: 2025-08-14
Payer: COMMERCIAL

## 2025-08-14 DIAGNOSIS — F91.3 OPPOSITIONAL DEFIANT DISORDER: ICD-10-CM

## 2025-08-14 DIAGNOSIS — F10.21 ALCOHOL USE DISORDER, MODERATE, IN EARLY REMISSION (H): ICD-10-CM

## 2025-08-14 DIAGNOSIS — R41.9 COGNITIVE COMPLAINTS: ICD-10-CM

## 2025-08-14 DIAGNOSIS — F90.9 ATTENTION DEFICIT HYPERACTIVITY DISORDER (ADHD), UNSPECIFIED ADHD TYPE: ICD-10-CM

## 2025-08-14 DIAGNOSIS — F33.3: Primary | ICD-10-CM

## 2025-08-18 PROBLEM — F84.0 AUTISM: Status: ACTIVE | Noted: 2025-08-18

## 2025-08-18 PROBLEM — H00.11 CHALAZION OF RIGHT UPPER EYELID: Status: RESOLVED | Noted: 2025-02-24 | Resolved: 2025-08-18

## 2025-08-19 ENCOUNTER — PATIENT OUTREACH (OUTPATIENT)
Dept: CARE COORDINATION | Facility: CLINIC | Age: 25
End: 2025-08-19
Payer: COMMERCIAL

## 2025-08-20 ENCOUNTER — MYC REFILL (OUTPATIENT)
Dept: FAMILY MEDICINE | Facility: CLINIC | Age: 25
End: 2025-08-20

## 2025-08-20 DIAGNOSIS — F41.1 GENERALIZED ANXIETY DISORDER: ICD-10-CM

## 2025-08-20 DIAGNOSIS — F33.42 RECURRENT MAJOR DEPRESSIVE DISORDER, IN FULL REMISSION: ICD-10-CM

## 2025-08-20 RX ORDER — BUPROPION HYDROCHLORIDE 150 MG/1
150 TABLET ORAL EVERY MORNING
Qty: 30 TABLET | Refills: 1 | OUTPATIENT
Start: 2025-08-20

## 2025-08-26 ENCOUNTER — PATIENT OUTREACH (OUTPATIENT)
Dept: NURSING | Facility: CLINIC | Age: 25
End: 2025-08-26
Payer: COMMERCIAL

## 2025-08-27 ASSESSMENT — ANXIETY QUESTIONNAIRES
4. TROUBLE RELAXING: NEARLY EVERY DAY
GAD7 TOTAL SCORE: 16
2. NOT BEING ABLE TO STOP OR CONTROL WORRYING: NEARLY EVERY DAY
GAD7 TOTAL SCORE: 16
GAD7 TOTAL SCORE: 16
8. IF YOU CHECKED OFF ANY PROBLEMS, HOW DIFFICULT HAVE THESE MADE IT FOR YOU TO DO YOUR WORK, TAKE CARE OF THINGS AT HOME, OR GET ALONG WITH OTHER PEOPLE?: VERY DIFFICULT
IF YOU CHECKED OFF ANY PROBLEMS ON THIS QUESTIONNAIRE, HOW DIFFICULT HAVE THESE PROBLEMS MADE IT FOR YOU TO DO YOUR WORK, TAKE CARE OF THINGS AT HOME, OR GET ALONG WITH OTHER PEOPLE: VERY DIFFICULT
7. FEELING AFRAID AS IF SOMETHING AWFUL MIGHT HAPPEN: NOT AT ALL
5. BEING SO RESTLESS THAT IT IS HARD TO SIT STILL: SEVERAL DAYS
3. WORRYING TOO MUCH ABOUT DIFFERENT THINGS: NEARLY EVERY DAY
7. FEELING AFRAID AS IF SOMETHING AWFUL MIGHT HAPPEN: NOT AT ALL
1. FEELING NERVOUS, ANXIOUS, OR ON EDGE: NEARLY EVERY DAY
6. BECOMING EASILY ANNOYED OR IRRITABLE: NEARLY EVERY DAY

## 2025-08-27 ASSESSMENT — PATIENT HEALTH QUESTIONNAIRE - PHQ9
10. IF YOU CHECKED OFF ANY PROBLEMS, HOW DIFFICULT HAVE THESE PROBLEMS MADE IT FOR YOU TO DO YOUR WORK, TAKE CARE OF THINGS AT HOME, OR GET ALONG WITH OTHER PEOPLE: EXTREMELY DIFFICULT
SUM OF ALL RESPONSES TO PHQ QUESTIONS 1-9: 20
SUM OF ALL RESPONSES TO PHQ QUESTIONS 1-9: 20

## 2025-08-28 ENCOUNTER — VIRTUAL VISIT (OUTPATIENT)
Dept: PSYCHOLOGY | Facility: CLINIC | Age: 25
End: 2025-08-28
Payer: COMMERCIAL

## 2025-08-28 DIAGNOSIS — F90.9 ATTENTION DEFICIT HYPERACTIVITY DISORDER (ADHD), UNSPECIFIED ADHD TYPE: ICD-10-CM

## 2025-08-28 DIAGNOSIS — F33.3: Primary | ICD-10-CM

## 2025-08-28 DIAGNOSIS — F10.21 ALCOHOL USE DISORDER, MODERATE, IN EARLY REMISSION (H): ICD-10-CM

## 2025-08-28 DIAGNOSIS — F91.3 OPPOSITIONAL DEFIANT DISORDER: ICD-10-CM

## 2025-08-28 DIAGNOSIS — F41.1 GENERALIZED ANXIETY DISORDER: ICD-10-CM
